# Patient Record
Sex: MALE | Race: WHITE | HISPANIC OR LATINO | Employment: OTHER | URBAN - METROPOLITAN AREA
[De-identification: names, ages, dates, MRNs, and addresses within clinical notes are randomized per-mention and may not be internally consistent; named-entity substitution may affect disease eponyms.]

---

## 2017-02-13 RX ORDER — SIMVASTATIN 40 MG
40 TABLET ORAL EVERY EVENING
Qty: 90 TAB | Refills: 3 | Status: SHIPPED | OUTPATIENT
Start: 2017-02-13 | End: 2018-01-31 | Stop reason: SDUPTHER

## 2017-04-28 ENCOUNTER — HOSPITAL ENCOUNTER (OUTPATIENT)
Dept: LAB | Facility: MEDICAL CENTER | Age: 62
End: 2017-04-28
Attending: FAMILY MEDICINE
Payer: COMMERCIAL

## 2017-04-28 LAB
ALBUMIN SERPL BCP-MCNC: 4.2 G/DL (ref 3.2–4.9)
ALBUMIN/GLOB SERPL: 1.6 G/DL
ALP SERPL-CCNC: 81 U/L (ref 30–99)
ALT SERPL-CCNC: 24 U/L (ref 2–50)
ANION GAP SERPL CALC-SCNC: 5 MMOL/L (ref 0–11.9)
AST SERPL-CCNC: 20 U/L (ref 12–45)
BILIRUB SERPL-MCNC: 0.5 MG/DL (ref 0.1–1.5)
BUN SERPL-MCNC: 18 MG/DL (ref 8–22)
CALCIUM SERPL-MCNC: 9.3 MG/DL (ref 8.5–10.5)
CHLORIDE SERPL-SCNC: 106 MMOL/L (ref 96–112)
CHOLEST SERPL-MCNC: 159 MG/DL (ref 100–199)
CO2 SERPL-SCNC: 28 MMOL/L (ref 20–33)
CREAT SERPL-MCNC: 1 MG/DL (ref 0.5–1.4)
EST. AVERAGE GLUCOSE BLD GHB EST-MCNC: 120 MG/DL
GFR SERPL CREATININE-BSD FRML MDRD: >60 ML/MIN/1.73 M 2
GLOBULIN SER CALC-MCNC: 2.7 G/DL (ref 1.9–3.5)
GLUCOSE SERPL-MCNC: 113 MG/DL (ref 65–99)
HBA1C MFR BLD: 5.8 % (ref 0–5.6)
HDLC SERPL-MCNC: 53 MG/DL
LDLC SERPL CALC-MCNC: 82 MG/DL
POTASSIUM SERPL-SCNC: 4.5 MMOL/L (ref 3.6–5.5)
PROT SERPL-MCNC: 6.9 G/DL (ref 6–8.2)
SODIUM SERPL-SCNC: 139 MMOL/L (ref 135–145)
TRIGL SERPL-MCNC: 121 MG/DL (ref 0–149)

## 2017-04-28 PROCEDURE — 80053 COMPREHEN METABOLIC PANEL: CPT

## 2017-04-28 PROCEDURE — 83036 HEMOGLOBIN GLYCOSYLATED A1C: CPT

## 2017-04-28 PROCEDURE — 80061 LIPID PANEL: CPT

## 2017-04-28 PROCEDURE — 36415 COLL VENOUS BLD VENIPUNCTURE: CPT

## 2017-05-23 ENCOUNTER — HOSPITAL ENCOUNTER (OUTPATIENT)
Dept: RADIOLOGY | Facility: MEDICAL CENTER | Age: 62
End: 2017-05-23
Attending: FAMILY MEDICINE
Payer: COMMERCIAL

## 2017-05-23 DIAGNOSIS — M25.552 LEFT HIP PAIN: ICD-10-CM

## 2017-05-23 PROCEDURE — 73502 X-RAY EXAM HIP UNI 2-3 VIEWS: CPT | Mod: LT

## 2017-05-29 ENCOUNTER — OFFICE VISIT (OUTPATIENT)
Dept: URGENT CARE | Facility: PHYSICIAN GROUP | Age: 62
End: 2017-05-29
Payer: COMMERCIAL

## 2017-05-29 VITALS
BODY MASS INDEX: 27.49 KG/M2 | HEIGHT: 70 IN | OXYGEN SATURATION: 92 % | DIASTOLIC BLOOD PRESSURE: 80 MMHG | SYSTOLIC BLOOD PRESSURE: 158 MMHG | HEART RATE: 96 BPM | TEMPERATURE: 97.8 F | WEIGHT: 192 LBS

## 2017-05-29 DIAGNOSIS — J40 BRONCHITIS: ICD-10-CM

## 2017-05-29 PROCEDURE — 99214 OFFICE O/P EST MOD 30 MIN: CPT | Performed by: EMERGENCY MEDICINE

## 2017-05-29 RX ORDER — DOXYCYCLINE HYCLATE 100 MG
100 TABLET ORAL 2 TIMES DAILY
Qty: 14 TAB | Refills: 0 | Status: SHIPPED | OUTPATIENT
Start: 2017-05-29 | End: 2018-05-31

## 2017-05-29 RX ORDER — CODEINE PHOSPHATE AND GUAIFENESIN 10; 100 MG/5ML; MG/5ML
5 SOLUTION ORAL EVERY 4 HOURS PRN
Qty: 200 ML | Refills: 0 | Status: SHIPPED | OUTPATIENT
Start: 2017-05-29 | End: 2018-05-31

## 2017-05-29 ASSESSMENT — ENCOUNTER SYMPTOMS
PALPITATIONS: 0
ABDOMINAL PAIN: 0
NECK PAIN: 0
STRIDOR: 0
NERVOUS/ANXIOUS: 0
SENSORY CHANGE: 0
HEMOPTYSIS: 0
NAUSEA: 0
DIARRHEA: 0
EYE DISCHARGE: 0
FEVER: 0
EYE PAIN: 0
HEADACHES: 1
COUGH: 0

## 2017-05-29 NOTE — PROGRESS NOTES
Subjective:      Tristin Hernandez is a 61 y.o. male who presents with Pharyngitis and Other            Pharyngitis   This is a new problem. The current episode started in the past 7 days. The problem has been unchanged. Neither side of throat is experiencing more pain than the other. The pain is mild. Associated symptoms include congestion and headaches (patient had severe headache yesterday no headache today.). Pertinent negatives include no abdominal pain, coughing, diarrhea, drooling, ear discharge, ear pain, neck pain or stridor. Associated symptoms comments: Aleve that had  3/17. I reassured him that the pills were probably safe but that the quantity taking 3 was probably too much to taking one 24-hour period time. But taking treated time.. He has had no exposure to mono.   Other  Associated symptoms include congestion and headaches (patient had severe headache yesterday no headache today.). Pertinent negatives include no abdominal pain, chest pain, coughing, fever, nausea, neck pain or rash.     Allergies   Allergen Reactions   • Kiwi Extract    • Penicillins Hives and Itching   • Sulfa Drugs      Dizzy,tongue swells     Social History     Social History   • Marital Status:      Spouse Name: N/A   • Number of Children: N/A   • Years of Education: N/A     Occupational History   • Not on file.     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  .     Past Medical History   Diagnosis Date   • Hyperlipidemia    • Heart burn    • Blood transfusion without reported diagnosis 11 yoa     during surgery/  [place]   • Kidney disease      kidney stone at 10 yoa  "  • Cataract      s/[p surgery hcristi   • Stroke (CMS-HCC) 6/2013     see ER records; anant consult after. presented with left sided weakness. takes 81mg daily     Current Outpatient Prescriptions on File Prior to Visit   Medication Sig Dispense Refill   • simvastatin (ZOCOR) 40 MG Tab Take 1 Tab by mouth every evening. 90 Tab 3   • Glucosamine-Chondroit-Vit C-Mn (GLUCOSAMINE 1500 COMPLEX) Cap Take  by mouth.     • aspirin EC (ECOTRIN) 81 MG Tablet Delayed Response Take 81 mg by mouth every day.     • omeprazole (PRILOSEC) 20 MG delayed-release capsule Take 1 Cap by mouth every day. TAKE 1 CAP BY MOUTH EVERY DAY. 90 Cap 3   • vitamin D (CHOLECALCIFEROL) 1000 UNIT Tab Take 1,000 Units by mouth every day.     • finasteride (PROSCAR) 5 MG Tab   1   • tamsulosin (FLOMAX) 0.4 MG capsule Take 0.4 mg by mouth 2 Times a Day.  4     No current facility-administered medications on file prior to visit.     Family History   Problem Relation Age of Onset   • Seizures Mother    • Cancer Sister      eldest sister-lung/chest       Review of Systems   Constitutional: Negative for fever.   HENT: Positive for congestion. Negative for drooling, ear discharge and ear pain.    Eyes: Negative for pain and discharge.   Respiratory: Negative for cough, hemoptysis and stridor.    Cardiovascular: Negative for chest pain and palpitations.   Gastrointestinal: Negative for nausea, abdominal pain and diarrhea.   Musculoskeletal: Negative for neck pain.   Skin: Negative for rash.   Neurological: Positive for headaches (patient had severe headache yesterday no headache today.). Negative for sensory change.   Psychiatric/Behavioral: The patient is not nervous/anxious.           Objective:     /80 mmHg  Pulse 96  Temp(Src) 36.6 °C (97.8 °F)  Ht 1.778 m (5' 10\")  Wt 87.091 kg (192 lb)  BMI 27.55 kg/m2  SpO2 92%     Physical Exam   Constitutional: He appears well-nourished. No distress.   HENT:   Head: Normocephalic and atraumatic.   Right " Ear: External ear normal.   Left Ear: External ear normal.   Patient has no palpable tenderness of his frontal or maxillary sinuses.   Eyes: Conjunctivae are normal. Left eye exhibits no discharge.   Neck: Normal range of motion. No thyromegaly present.   Cardiovascular: Normal rate.    Pulmonary/Chest: Breath sounds normal. No respiratory distress. He has no wheezes.   Abdominal: He exhibits no distension. There is no tenderness.   Skin: Skin is dry. No erythema. No pallor.   Psychiatric: He has a normal mood and affect.   Vitals reviewed.              Assessment/Plan:     1. Bronchitis      I am recommending the patient initiate/ continue hydration efforts including the use of a vaporizer/humidifier/ netti pot. I also recommend the pt, initiate Mucinex .. In addition the patient will initiate the prescribed prescription medication/s:  If the patient's condition exacerbates with worsening dysphagia, shortness of breath, uncontrolled fever, headache or chest pressure he/she will return immediately to the urgent care or go to  the emergency department for further evaluation.  Patient will initiate Vibramycin 100 mg twice a day. (Allergic to penicillins, sulfa drugs and). Additional Cheratussin for his cough to use at home only. He's been given a work note for the next 2 days off.  SHARIF Talbot    - doxycycline (VIBRAMYCIN) 100 MG Tab; Take 1 Tab by mouth 2 times a day.  Dispense: 14 Tab; Refill: 0  - guaifenesin-codeine (CHERATUSSIN AC) Solution oral solution; Take 5 mL by mouth every four hours as needed.  Dispense: 200 mL; Refill: 0

## 2017-05-29 NOTE — MR AVS SNAPSHOT
"        Tristin ShawUriah   2017 9:45 AM   Office Visit   MRN: [id number]    Department:  [facility]   Dept Phone:  [phone]    Description:  Male : 1955   Provider:  SHARIF Talbot M.D.           Reason for Visit     Pharyngitis sore throat and cough x1week     Other is concerned because he took  pills and isnt feeling well      Allergies as of 2017     Allergen Noted Reactions    Kiwi Extract 12/15/2015       Penicillins 2012   Hives, Itching    Sulfa Drugs 2012       Dizzy,tongue swells      You were diagnosed with     Bronchitis   [182227]         Vital Signs     Blood Pressure Pulse Temperature Height Weight Body Mass Index    158/80 mmHg 96 36.6 °C (97.8 °F) 1.778 m (5' 10\") 87.091 kg (192 lb) 27.55 kg/m2    Oxygen Saturation Smoking Status                92% Current Every Day Smoker          Basic Information     Date Of Birth Sex Race Ethnicity Preferred Language    1955 Male  or   Origin (Pashto,Equatorial [place]inean,Nicaraguan,Bam, etc) English      Problem List              ICD-10-CM Priority Class Noted - Resolved    History of CVA (cerebrovascular accident) without residual deficits Z86.73 Medium  2013 - Present    PETER (obstructive sleep apnea) G47.33 Medium  2013 - Present    Hypercholesteremia E78.00 Medium  2013 - Present    Tobacco dependence F17.200 Low  2013 - Present    History of positive PPD R76.11 Low  2014 - Present    Vitamin D deficiency E55.9 Low  12/15/2015 - Present    BPH (benign prostatic hyperplasia) N40.0 Medium  12/15/2015 - Present    IGT A1c-=5.8 R73.02 Low  12/15/2015 - Present    Gastroesophageal reflux disease without esophagitis K21.9   12/15/2015 - Present      Health Maintenance        Date Due Completion Dates    IMM DTaP/Tdap/Td Vaccine (1 - Tdap) 2009 6/15/2009    IMM ZOSTER VACCINE 2013 ---    COLONOSCOPY 2018 (Done)    Override on 2013: Done " (polyps-non-cancerous.  [facility])            Current Immunizations     Influenza TIV (IM) 10/24/2013, 11/30/2012    Influenza Vaccine Quad Inj (Pf) 10/3/2016  7:42 AM, 10/5/2015  7:39 AM    Pneumococcal polysaccharide vaccine (PPSV-23) 6/18/2013  3:21 PM    TD Vaccine 6/15/2009    Tuberculin Skin Test 9/17/2014      Below and/or attached are the medications your provider expects you to take. Review all of your home medications and newly ordered medications with your provider and/or pharmacist. Follow medication instructions as directed by your provider and/or pharmacist. Please keep your medication list with you and share with your provider. Update the information when medications are discontinued, doses are changed, or new medications (including over-the-counter products) are added; and carry medication information at all times in the event of emergency situations     Allergies:  KIWI EXTRACT - (reactions not documented)     PENICILLINS - Hives,Itching     SULFA DRUGS - (reactions not documented)               Medications  Valid as of: May 29, 2017 - 10:23 AM    Generic Name Brand Name Tablet Size Instructions for use    Aspirin (Tablet Delayed Response) ECOTRIN 81 MG Take 81 mg by mouth every day.        Cholecalciferol (Tab) cholecalciferol 1000 UNIT Take 1,000 Units by mouth every day.        Doxycycline Hyclate (Tab) VIBRAMYCIN 100 MG Take 1 Tab by mouth 2 times a day.        Finasteride (Tab) PROSCAR 5 MG         Glucosamine-Chondroit-Vit C-Mn (Cap) GLUCOSAMINE 1500 COMPLEX  Take  by mouth.        Guaifenesin-Codeine (Solution) ROBITUSSIN -10 mg/5mL Take 5 mL by mouth every four hours as needed.        Omeprazole (CAPSULE DELAYED RELEASE) PRILOSEC 20 MG Take 1 Cap by mouth every day. TAKE 1 CAP BY MOUTH EVERY DAY.        Simvastatin (Tab) ZOCOR 40 MG Take 1 Tab by mouth every evening.        Tamsulosin HCl (Cap) FLOMAX 0.4 MG Take 0.4 mg by mouth 2 Times a Day.        .                 Medicines  prescribed today were sent to:     [facility] #[id number] - [place], [place] - [address] AT [facility], operated [facility]    [address] [place] [address]    Phone: [phone] Fax: [phone]    Open 24 Hours?: No      Medication refill instructions:       If your prescription bottle indicates you have medication refills left, it is not necessary to call your provider’s office. Please contact your pharmacy and they will refill your medication.    If your prescription bottle indicates you do not have any refills left, you may request refills at any time through one of the following ways: The online [employer] system (except Urgent Care), by calling your provider’s office, or by asking your pharmacy to contact your provider’s office with a refill request. Medication refills are processed only during regular business hours and may not be available until the next business day. Your provider may request additional information or to have a follow-up visit with you prior to refilling your medication.   *Please Note: Medication refills are assigned a new Rx number when refilled electronically. Your pharmacy may indicate that no refills were authorized even though a new prescription for the same medication is available at the pharmacy. Please request the medicine by name with the pharmacy before contacting your provider for a refill.           [employer] Access Code: Activation code not generated  Current [employer] Status: Active          Quit Tobacco Information     Do you want to quit using tobacco?    Quitting tobacco decreases risks of cancer, heart and lung disease, increases life expectancy, improves sense of taste and smell, and increases spending money, among other benefits.    If you are thinking about quitting, we can help.  • [facility] Quit Tobacco Program: 433.147.8787  o Program occurs weekly for four weeks and includes pharmacist consultation on products to support quitting smoking or chewing tobacco. A provider  referral is needed for pharmacist consultation.  • Tobacco Users Help Hotline: 6-424-QUIT-NOW (400-4498) or https://[place].quitlogix.org/  o Free, confidential telephone and online coaching for [place] residents. Sessions are designed on a schedule that is convenient for you. Eligible clients receive free nicotine replacement therapy.  • Nationally: www.smokefree.gov  o Information and professional assistance to support both immediate and long-term needs as you become, and remain, a non-smoker. Smokefree.gov allows you to choose the help that best fits your needs.

## 2017-05-29 NOTE — Clinical Note
May 29, 2017        Tristin Hernandez  [address] [facility] [address]        Dear Tristin:    Please ask for today and tomorrow off from work for medical reasons.    If you have any questions or concerns, please don't hesitate to call.        Sincerely,        SHARIF Talbot M.D.    Electronically Signed

## 2017-08-01 ENCOUNTER — HOSPITAL ENCOUNTER (OUTPATIENT)
Dept: LAB | Facility: MEDICAL CENTER | Age: 62
End: 2017-08-01
Attending: UROLOGY
Payer: COMMERCIAL

## 2017-08-01 LAB — PSA SERPL-MCNC: 1.36 NG/ML (ref 0–4)

## 2017-08-01 PROCEDURE — 84153 ASSAY OF PSA TOTAL: CPT

## 2017-09-22 ENCOUNTER — EH NON-PROVIDER (OUTPATIENT)
Dept: OCCUPATIONAL MEDICINE | Facility: CLINIC | Age: 62
End: 2017-09-22

## 2017-09-22 DIAGNOSIS — Z29.89 NEED FOR ISOLATION: ICD-10-CM

## 2017-09-22 PROCEDURE — 94375 RESPIRATORY FLOW VOLUME LOOP: CPT

## 2017-10-03 ENCOUNTER — IMMUNIZATION (OUTPATIENT)
Dept: OCCUPATIONAL MEDICINE | Facility: CLINIC | Age: 62
End: 2017-10-03

## 2017-10-03 DIAGNOSIS — Z23 NEED FOR VACCINATION: ICD-10-CM

## 2017-10-03 PROCEDURE — 90686 IIV4 VACC NO PRSV 0.5 ML IM: CPT | Performed by: PREVENTIVE MEDICINE

## 2017-10-16 ENCOUNTER — HOSPITAL ENCOUNTER (OUTPATIENT)
Dept: LAB | Facility: MEDICAL CENTER | Age: 62
End: 2017-10-16
Attending: FAMILY MEDICINE
Payer: COMMERCIAL

## 2017-10-16 LAB
ALBUMIN SERPL BCP-MCNC: 3.1 G/DL (ref 3.2–4.9)
ALBUMIN/GLOB SERPL: 0.9 G/DL
ALP SERPL-CCNC: 67 U/L (ref 30–99)
ALT SERPL-CCNC: 23 U/L (ref 2–50)
ANION GAP SERPL CALC-SCNC: 9 MMOL/L (ref 0–11.9)
AST SERPL-CCNC: 19 U/L (ref 12–45)
BILIRUB SERPL-MCNC: 0.5 MG/DL (ref 0.1–1.5)
BUN SERPL-MCNC: 19 MG/DL (ref 8–22)
CALCIUM SERPL-MCNC: 9 MG/DL (ref 8.5–10.5)
CHLORIDE SERPL-SCNC: 108 MMOL/L (ref 96–112)
CHOLEST SERPL-MCNC: 157 MG/DL (ref 100–199)
CO2 SERPL-SCNC: 25 MMOL/L (ref 20–33)
CREAT SERPL-MCNC: 0.82 MG/DL (ref 0.5–1.4)
EST. AVERAGE GLUCOSE BLD GHB EST-MCNC: 120 MG/DL
GFR SERPL CREATININE-BSD FRML MDRD: >60 ML/MIN/1.73 M 2
GLOBULIN SER CALC-MCNC: 3.5 G/DL (ref 1.9–3.5)
GLUCOSE SERPL-MCNC: 107 MG/DL (ref 65–99)
HBA1C MFR BLD: 5.8 % (ref 0–5.6)
HDLC SERPL-MCNC: 54 MG/DL
LDLC SERPL CALC-MCNC: 82 MG/DL
POTASSIUM SERPL-SCNC: 3.7 MMOL/L (ref 3.6–5.5)
PROT SERPL-MCNC: 6.6 G/DL (ref 6–8.2)
SODIUM SERPL-SCNC: 142 MMOL/L (ref 135–145)
TRIGL SERPL-MCNC: 104 MG/DL (ref 0–149)

## 2017-10-16 PROCEDURE — 80053 COMPREHEN METABOLIC PANEL: CPT

## 2017-10-16 PROCEDURE — 36415 COLL VENOUS BLD VENIPUNCTURE: CPT

## 2017-10-16 PROCEDURE — 83036 HEMOGLOBIN GLYCOSYLATED A1C: CPT

## 2017-10-16 PROCEDURE — 80061 LIPID PANEL: CPT

## 2018-01-12 ENCOUNTER — HOSPITAL ENCOUNTER (OUTPATIENT)
Dept: RADIOLOGY | Facility: MEDICAL CENTER | Age: 63
End: 2018-01-12
Attending: FAMILY MEDICINE
Payer: COMMERCIAL

## 2018-01-12 PROCEDURE — 73030 X-RAY EXAM OF SHOULDER: CPT | Mod: LT

## 2018-03-23 ENCOUNTER — HOSPITAL ENCOUNTER (OUTPATIENT)
Dept: LAB | Facility: MEDICAL CENTER | Age: 63
End: 2018-03-23
Attending: FAMILY MEDICINE
Payer: COMMERCIAL

## 2018-03-23 LAB
ALBUMIN SERPL BCP-MCNC: 3.9 G/DL (ref 3.2–4.9)
ALBUMIN/GLOB SERPL: 1.6 G/DL
ALP SERPL-CCNC: 52 U/L (ref 30–99)
ALT SERPL-CCNC: 18 U/L (ref 2–50)
ANION GAP SERPL CALC-SCNC: 6 MMOL/L (ref 0–11.9)
AST SERPL-CCNC: 15 U/L (ref 12–45)
BILIRUB SERPL-MCNC: 0.7 MG/DL (ref 0.1–1.5)
BUN SERPL-MCNC: 18 MG/DL (ref 8–22)
CALCIUM SERPL-MCNC: 9.2 MG/DL (ref 8.5–10.5)
CHLORIDE SERPL-SCNC: 106 MMOL/L (ref 96–112)
CHOLEST SERPL-MCNC: 212 MG/DL (ref 100–199)
CO2 SERPL-SCNC: 26 MMOL/L (ref 20–33)
CREAT SERPL-MCNC: 0.87 MG/DL (ref 0.5–1.4)
EST. AVERAGE GLUCOSE BLD GHB EST-MCNC: 123 MG/DL
GLOBULIN SER CALC-MCNC: 2.5 G/DL (ref 1.9–3.5)
GLUCOSE SERPL-MCNC: 104 MG/DL (ref 65–99)
HBA1C MFR BLD: 5.9 % (ref 0–5.6)
HDLC SERPL-MCNC: 49 MG/DL
LDLC SERPL CALC-MCNC: 123 MG/DL
POTASSIUM SERPL-SCNC: 3.6 MMOL/L (ref 3.6–5.5)
PROT SERPL-MCNC: 6.4 G/DL (ref 6–8.2)
SODIUM SERPL-SCNC: 138 MMOL/L (ref 135–145)
TRIGL SERPL-MCNC: 198 MG/DL (ref 0–149)

## 2018-03-23 PROCEDURE — 80053 COMPREHEN METABOLIC PANEL: CPT

## 2018-03-23 PROCEDURE — 80061 LIPID PANEL: CPT

## 2018-03-23 PROCEDURE — 83036 HEMOGLOBIN GLYCOSYLATED A1C: CPT

## 2018-03-23 PROCEDURE — 36415 COLL VENOUS BLD VENIPUNCTURE: CPT

## 2018-05-18 ENCOUNTER — HOSPITAL ENCOUNTER (OUTPATIENT)
Dept: RADIOLOGY | Facility: MEDICAL CENTER | Age: 63
End: 2018-05-18
Attending: SURGERY
Payer: COMMERCIAL

## 2018-05-18 DIAGNOSIS — M25.512 LEFT SHOULDER PAIN, UNSPECIFIED CHRONICITY: ICD-10-CM

## 2018-05-18 PROCEDURE — 73221 MRI JOINT UPR EXTREM W/O DYE: CPT | Mod: LT

## 2018-05-31 ENCOUNTER — APPOINTMENT (OUTPATIENT)
Dept: ADMISSIONS | Facility: MEDICAL CENTER | Age: 63
End: 2018-05-31
Attending: OPHTHALMOLOGY
Payer: COMMERCIAL

## 2018-05-31 DIAGNOSIS — Z01.810 PRE-OPERATIVE CARDIOVASCULAR EXAMINATION: ICD-10-CM

## 2018-05-31 DIAGNOSIS — Z01.812 PRE-OPERATIVE LABORATORY EXAMINATION: ICD-10-CM

## 2018-05-31 LAB
BASOPHILS # BLD AUTO: 0.7 % (ref 0–1.8)
BASOPHILS # BLD: 0.06 K/UL (ref 0–0.12)
EKG IMPRESSION: NORMAL
EOSINOPHIL # BLD AUTO: 0.21 K/UL (ref 0–0.51)
EOSINOPHIL NFR BLD: 2.5 % (ref 0–6.9)
ERYTHROCYTE [DISTWIDTH] IN BLOOD BY AUTOMATED COUNT: 41.3 FL (ref 35.9–50)
HCT VFR BLD AUTO: 47.7 % (ref 42–52)
HGB BLD-MCNC: 16.5 G/DL (ref 14–18)
IMM GRANULOCYTES # BLD AUTO: 0.04 K/UL (ref 0–0.11)
IMM GRANULOCYTES NFR BLD AUTO: 0.5 % (ref 0–0.9)
LYMPHOCYTES # BLD AUTO: 2.22 K/UL (ref 1–4.8)
LYMPHOCYTES NFR BLD: 26.6 % (ref 22–41)
MCH RBC QN AUTO: 31.3 PG (ref 27–33)
MCHC RBC AUTO-ENTMCNC: 34.6 G/DL (ref 33.7–35.3)
MCV RBC AUTO: 90.5 FL (ref 81.4–97.8)
MONOCYTES # BLD AUTO: 0.8 K/UL (ref 0–0.85)
MONOCYTES NFR BLD AUTO: 9.6 % (ref 0–13.4)
NEUTROPHILS # BLD AUTO: 5.03 K/UL (ref 1.82–7.42)
NEUTROPHILS NFR BLD: 60.1 % (ref 44–72)
NRBC # BLD AUTO: 0 K/UL
NRBC BLD-RTO: 0 /100 WBC
PLATELET # BLD AUTO: 207 K/UL (ref 164–446)
PMV BLD AUTO: 9.2 FL (ref 9–12.9)
RBC # BLD AUTO: 5.27 M/UL (ref 4.7–6.1)
WBC # BLD AUTO: 8.4 K/UL (ref 4.8–10.8)

## 2018-05-31 PROCEDURE — 36415 COLL VENOUS BLD VENIPUNCTURE: CPT

## 2018-05-31 PROCEDURE — 85025 COMPLETE CBC W/AUTO DIFF WBC: CPT

## 2018-05-31 RX ORDER — CELECOXIB 200 MG/1
200 CAPSULE ORAL DAILY
COMMUNITY
End: 2018-12-18

## 2018-05-31 NOTE — OR NURSING
Pre admit: spoke with Herminia at Dr Bee's office and she said patient does not have to stop any of his Rx's or OTC and OK to take DOS.

## 2018-06-04 ENCOUNTER — HOSPITAL ENCOUNTER (OUTPATIENT)
Facility: MEDICAL CENTER | Age: 63
End: 2018-06-04
Attending: OPHTHALMOLOGY | Admitting: OPHTHALMOLOGY
Payer: COMMERCIAL

## 2018-06-04 VITALS
BODY MASS INDEX: 26.02 KG/M2 | OXYGEN SATURATION: 94 % | WEIGHT: 185.85 LBS | RESPIRATION RATE: 17 BRPM | DIASTOLIC BLOOD PRESSURE: 80 MMHG | TEMPERATURE: 98.4 F | HEIGHT: 71 IN | SYSTOLIC BLOOD PRESSURE: 166 MMHG | HEART RATE: 64 BPM

## 2018-06-04 PROCEDURE — 500558 HCHG EYE SHIELD W/GARTER (FOX): Performed by: OPHTHALMOLOGY

## 2018-06-04 PROCEDURE — 501836 HCHG SUTURE EYE: Performed by: OPHTHALMOLOGY

## 2018-06-04 PROCEDURE — 160036 HCHG PACU - EA ADDL 30 MINS PHASE I: Performed by: OPHTHALMOLOGY

## 2018-06-04 PROCEDURE — 700111 HCHG RX REV CODE 636 W/ 250 OVERRIDE (IP)

## 2018-06-04 PROCEDURE — 160002 HCHG RECOVERY MINUTES (STAT): Performed by: OPHTHALMOLOGY

## 2018-06-04 PROCEDURE — 160035 HCHG PACU - 1ST 60 MINS PHASE I: Performed by: OPHTHALMOLOGY

## 2018-06-04 PROCEDURE — 160041 HCHG SURGERY MINUTES - EA ADDL 1 MIN LEVEL 4: Performed by: OPHTHALMOLOGY

## 2018-06-04 PROCEDURE — 160048 HCHG OR STATISTICAL LEVEL 1-5: Performed by: OPHTHALMOLOGY

## 2018-06-04 PROCEDURE — A6402 STERILE GAUZE <= 16 SQ IN: HCPCS | Performed by: OPHTHALMOLOGY

## 2018-06-04 PROCEDURE — A6410 STERILE EYE PAD: HCPCS | Performed by: OPHTHALMOLOGY

## 2018-06-04 PROCEDURE — 700102 HCHG RX REV CODE 250 W/ 637 OVERRIDE(OP)

## 2018-06-04 PROCEDURE — 700101 HCHG RX REV CODE 250

## 2018-06-04 PROCEDURE — A9270 NON-COVERED ITEM OR SERVICE: HCPCS

## 2018-06-04 PROCEDURE — 160029 HCHG SURGERY MINUTES - 1ST 30 MINS LEVEL 4: Performed by: OPHTHALMOLOGY

## 2018-06-04 PROCEDURE — 160009 HCHG ANES TIME/MIN: Performed by: OPHTHALMOLOGY

## 2018-06-04 RX ORDER — BALANCED SALT SOLUTION 6.4; .75; .48; .3; 3.9; 1.7 MG/ML; MG/ML; MG/ML; MG/ML; MG/ML; MG/ML
SOLUTION OPHTHALMIC
Status: DISCONTINUED | OUTPATIENT
Start: 2018-06-04 | End: 2018-06-04 | Stop reason: HOSPADM

## 2018-06-04 RX ORDER — OXYCODONE HCL 5 MG/5 ML
SOLUTION, ORAL ORAL
Status: COMPLETED
Start: 2018-06-04 | End: 2018-06-04

## 2018-06-04 RX ORDER — DEXAMETHASONE SODIUM PHOSPHATE 4 MG/ML
INJECTION, SOLUTION INTRA-ARTICULAR; INTRALESIONAL; INTRAMUSCULAR; INTRAVENOUS; SOFT TISSUE
Status: DISCONTINUED | OUTPATIENT
Start: 2018-06-04 | End: 2018-06-04 | Stop reason: HOSPADM

## 2018-06-04 RX ORDER — SODIUM CHLORIDE, SODIUM LACTATE, POTASSIUM CHLORIDE, CALCIUM CHLORIDE 600; 310; 30; 20 MG/100ML; MG/100ML; MG/100ML; MG/100ML
INJECTION, SOLUTION INTRAVENOUS CONTINUOUS
Status: DISCONTINUED | OUTPATIENT
Start: 2018-06-04 | End: 2018-06-04 | Stop reason: HOSPADM

## 2018-06-04 RX ORDER — NEOMYCIN SULFATE, POLYMYXIN B SULFATE, AND DEXAMETHASONE 3.5; 10000; 1 MG/G; [USP'U]/G; MG/G
OINTMENT OPHTHALMIC
Status: DISCONTINUED | OUTPATIENT
Start: 2018-06-04 | End: 2018-06-04 | Stop reason: HOSPADM

## 2018-06-04 RX ORDER — BALANCED SALT SOLUTION ENRICHED WITH BICARBONATE, DEXTROSE, AND GLUTATHIONE
KIT INTRAOCULAR
Status: DISCONTINUED | OUTPATIENT
Start: 2018-06-04 | End: 2018-06-04 | Stop reason: HOSPADM

## 2018-06-04 RX ORDER — VANCOMYCIN HYDROCHLORIDE 500 MG/10ML
INJECTION, POWDER, LYOPHILIZED, FOR SOLUTION INTRAVENOUS
Status: COMPLETED | OUTPATIENT
Start: 2018-06-04 | End: 2018-06-04

## 2018-06-04 RX ORDER — TIMOLOL MALEATE 5 MG/ML
SOLUTION/ DROPS OPHTHALMIC
Status: DISCONTINUED | OUTPATIENT
Start: 2018-06-04 | End: 2018-06-04 | Stop reason: HOSPADM

## 2018-06-04 RX ORDER — CYCLOPENTOLATE HYDROCHLORIDE 10 MG/ML
SOLUTION/ DROPS OPHTHALMIC
Status: COMPLETED
Start: 2018-06-04 | End: 2018-06-04

## 2018-06-04 RX ORDER — BALANCED SALT SOLUTION ENRICHED WITH BICARBONATE, DEXTROSE, AND GLUTATHIONE
KIT INTRAOCULAR
Status: DISCONTINUED
Start: 2018-06-04 | End: 2018-06-04 | Stop reason: HOSPADM

## 2018-06-04 RX ORDER — PHENYLEPHRINE HYDROCHLORIDE 25 MG/ML
SOLUTION/ DROPS OPHTHALMIC
Status: COMPLETED
Start: 2018-06-04 | End: 2018-06-04

## 2018-06-04 RX ADMIN — CYCLOPENTOLATE HYDROCHLORIDE 1 DROP: 10 SOLUTION/ DROPS OPHTHALMIC at 12:57

## 2018-06-04 RX ADMIN — PHENYLEPHRINE HYDROCHLORIDE 1 DROP: 2.5 SOLUTION/ DROPS OPHTHALMIC at 12:57

## 2018-06-04 RX ADMIN — FENTANYL CITRATE 25 MCG: 50 INJECTION, SOLUTION INTRAMUSCULAR; INTRAVENOUS at 15:54

## 2018-06-04 RX ADMIN — FENTANYL CITRATE 25 MCG: 50 INJECTION, SOLUTION INTRAMUSCULAR; INTRAVENOUS at 16:30

## 2018-06-04 RX ADMIN — OXYCODONE HYDROCHLORIDE 5 MG: 5 SOLUTION ORAL at 16:30

## 2018-06-04 RX ADMIN — OXYCODONE HYDROCHLORIDE 5 MG: 5 SOLUTION ORAL at 15:54

## 2018-06-04 RX ADMIN — FENTANYL CITRATE 25 MCG: 50 INJECTION, SOLUTION INTRAMUSCULAR; INTRAVENOUS at 15:41

## 2018-06-04 RX ADMIN — FENTANYL CITRATE 25 MCG: 50 INJECTION, SOLUTION INTRAMUSCULAR; INTRAVENOUS at 16:03

## 2018-06-04 ASSESSMENT — PAIN SCALES - GENERAL
PAINLEVEL_OUTOF10: 4
PAINLEVEL_OUTOF10: 4
PAINLEVEL_OUTOF10: 6
PAINLEVEL_OUTOF10: 6
PAINLEVEL_OUTOF10: 0
PAINLEVEL_OUTOF10: 3
PAINLEVEL_OUTOF10: 1
PAINLEVEL_OUTOF10: 4

## 2018-06-04 NOTE — OR NURSING
1520 Pt arrived from OR with Dr. Jaffe.  Pt VSS, PIV infusing without issue.  Oral airway in place, blow by O2 in use.  Eye patch in place to right eye, CDI.    1530 Oral airway removed, pt tolerated well.   1540 Pt complaints of pain, medicated with IV Fentanyl per order.   1545 Attempted to get pt wife, not in waiting room.   1554 Pt tells me he is still having pain, medicated with PO oxycodone and IV fentanyl.    1603 Pt medicated again with IV Fentanyl.   1610 Pt wife brought to bedside.    1615 Report to Melva BASHIR.

## 2018-06-04 NOTE — DISCHARGE INSTRUCTIONS
ACTIVITY: Rest and take it easy for the first 24 hours.  A responsible adult is recommended to remain with you during that time.  It is normal to feel sleepy.  We encourage you to not do anything that requires balance, judgment or coordination.    MILD FLU-LIKE SYMPTOMS ARE NORMAL. YOU MAY EXPERIENCE GENERALIZED MUSCLE ACHES, THROAT IRRITATION, HEADACHE AND/OR SOME NAUSEA.    FOR 24 HOURS DO NOT:  Drive, operate machinery or run household appliances.  Drink beer or alcoholic beverages.   Make important decisions or sign legal documents.    SPECIAL INSTRUCTIONS: *Rotate between left and right side every 2 hours while sleeping. Face down when awake**    DIET: To avoid nausea, slowly advance diet as tolerated, avoiding spicy or greasy foods for the first day.  Add more substantial food to your diet according to your physician's instructions.  Babies can be fed formula or breast milk as soon as they are hungry.  INCREASE FLUIDS AND FIBER TO AVOID CONSTIPATION.    SURGICAL DRESSING/BATHING: *Keep eye patch on at all times until follow up appt**    FOLLOW-UP APPOINTMENT:  A follow-up appointment should be arranged with your doctor; call to schedule.    You should CALL YOUR PHYSICIAN if you develop:  Fever greater than 101 degrees F.  Pain not relieved by medication, or persistent nausea or vomiting.  Excessive bleeding (blood soaking through dressing) or unexpected drainage from the wound.  Extreme redness or swelling around the incision site, drainage of pus or foul smelling drainage.  Inability to urinate or empty your bladder within 8 hours.  Problems with breathing or chest pain.    You should call 911 if you develop problems with breathing or chest pain.  If you are unable to contact your doctor or surgical center, you should go to the nearest emergency room or urgent care center.    Physician's telephone #: **[phone]*    If any questions arise, call your doctor.  If your doctor is not available, please feel free  to call the Surgical Center at [phone].  The Center is open Monday through Friday from 7AM to 7PM.  You can also call the HEALTH HOTLINE open 24 hours/day, 7 days/week and speak to a nurse at [phone], or toll free at [phone].    A registered nurse may call you a few days after your surgery to see how you are doing after your procedure.    MEDICATIONS: Resume taking daily medication.  Take prescribed pain medication with food.  If no medication is prescribed, you may take non-aspirin pain medication if needed.  PAIN MEDICATION CAN BE VERY CONSTIPATING.  Take a stool softener or laxative such as senokot, pericolace, or milk of magnesia if needed.    Prescription given for *none**.  Last pain medication given at  430pm**.    If your physician has prescribed pain medication that includes Acetaminophen (Tylenol), do not take additional Acetaminophen (Tylenol) while taking the prescribed medication.    Depression / Suicide Risk    As you are discharged from this [facility] facility, it is important to learn how to keep safe from harming yourself.    Recognize the warning signs:  · Abrupt changes in personality, positive or negative- including increase in energy   · Giving away possessions  · Change in eating patterns- significant weight changes-  positive or negative  · Change in sleeping patterns- unable to sleep or sleeping all the time   · Unwillingness or inability to communicate  · Depression  · Unusual sadness, discouragement and loneliness  · Talk of wanting to die  · Neglect of personal appearance   · Rebelliousness- reckless behavior  · Withdrawal from people/activities they love  · Confusion- inability to concentrate     If you or a loved one observes any of these behaviors or has concerns about self-harm, here's what you can do:  · Talk about it- your feelings and reasons for harming yourself  · Remove any means that you might use to hurt yourself (examples: pills, rope, extension cords,  firearm)  · Get professional help from the community (Mental Health, Substance Abuse, psychological counseling)  · Do not be alone:Call your Safe Contact- someone whom you trust who will be there for you.  · Call your local CRISIS HOTLINE 452-1501 or 829-795-4915  · Call your local Children's Mobile Crisis Response Team [place] (916) 499-9879 or www.Buffer  · Call the toll free National Suicide Prevention Hotlines   · National Suicide Prevention Lifeline 539-165-FLWR (7067)  [facility] 800-SUICIDE (479-3623)RETINAL DETACHMENT OR VITRECTOMY INSTRUCTIONS    These instructions are provided so that you can have the best chance for a successful recovery from your recent eye surgery. In general, they will remain in effect for at least two to three weeks following your operation.   Please call my office to see me within one week following your discharge from the hospital. You can make this appointment by calling my office. Call Monday through Friday from 8:00 am to 5:00 pm. For patients who live a great distance from my office, I may ask you to make arrangements with your local ophthalmologist for follow up care instead of returning here.     Medications:     There medications are to be used while you are awake. A good night's sleep is an important part of the healing process.     1.   Tobradex, one drop in the operated eye every 4 hours.     2.   Tylenol should be sufficient for any pain; if not, please call my office.     3.   Other medications as directed.     Eye Care:    After leaving the hospital, a patch over the operated eye is considered optional. If a patch is worn at home, it should be changed at least once daily. These patches can usually be purchased at your local drug store.   If the eyelids become stuck together because of discharge, gently wash them with a clean damp washcloth moistened with warm tap water. It is advisable to wash under the eye with a damp washcloth at least  once a day; be careful not to touch or press on the eye itself.     Appearance and sensation in the operated eye:    1.   It is normal for the operated eye to remain somewhat red, swollen and slightly irritated for four to six weeks.     2.   It is expected that there will be an increased amount of tearing and mattering in the operated eye.     Return of eyesight:     1.   You final best vision will not be known until the eye and retina are completely healed, which takes at least two to three months and sometimes much longer.     2.   Following this type of surgery, you may require a change in the prescription for your glasses or contact lenses. In general, checking for a new prescription is not done until at least two to three months following your surgery.     Physical Activities:    Things to Avoid:    1.   Aspirin  2.   Lifting or moving heavy objects (20 pounds or more)  3.   Rubbing the operated eye.   4.   Strenuous or jarring physical exertion such as running, jumping, aerobics and swimming.   5.   Driving a car.  6.   Garden or yard work.  7.   Wearing of contact lenses in the operated eye for 4-6 weeks.  8.   Returning to work or school; depending upon the nature of eye surgery and occupation, I will advise you to refrain from returning to school or work for anywhere from 2-8 weeks.   9.   Air travel unless otherwise instructed.   10. Reading unless otherwise instructed.     Permissible Activities:    1.   Watching television and using your eyes in moderation.   2.   Cooking and light housework.   3.   Riding in a car on paved roads.  4.   Showering, bathing and shaving; however, avoid getting water in your eyes.     Indications for calling my office:     1.   Increased swelling or redness of the eyelids.  2.   Increased persistent pain in the eye which is not relieved by Tylenol or which does not go away within 24 hours.   3.   Decreased vision.         ·

## 2018-06-05 NOTE — PROGRESS NOTES
"1615 Bedside report received from Luisana BASHIR. Pt resting comfortably. Tolerating sips of water.     1630 Pt states \"pain is getting worse.\" Medicated per MAR. Repositioned.     1710 Pt states he is ready to go home. Disconnected from monitor to get dressed.     1727 Pt and spouse educated on discharge instructions. Verbalized understanding. All belongings are with patient. Discharged home.   "

## 2018-06-05 NOTE — OP REPORT
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    SURGEON:  Rolf Bee MD    ASSISTANT:  None.    ANESTHESIA:  General.    ANESTHESIOLOGIST:  Guanakito Jaffe MD    BLOOD LOSS:  None.    SPECIMENS REMOVED:  None.    INDICATION FOR SURGERY:  This patient presented with a chronic retinal   detachment with subretinal bands inferiorly in the right eye.  We discussed in   detail the risks, benefits and alternatives regarding surgery.  The risks   include, but not limited to the following, lens changes, high eye pressure,   low eye pressure, bleeding, infection, persistent detachment, recurrent   detachment, need for further surgery, pain, induced refractive error, complete   and irreversible loss of all vision, loss of the eye.  The patient understood   there were no structural or visual guarantees.  He understood that   postoperative positioning would be important to the success of the surgery.    He understood that he would be prohibited from going up an elevation as long   as any gas remained in the eye.  He also on a separate occasion watched a   video that reviewed the risks and benefits of surgery.  He had a chance of all   his questions answered.  He consented to proceed.    PROCEDURE IN DETAIL:  Patient prepped and draped in the usual sterile manner.    Attention was directed toward the right eye.  The 25 gauge trocars were   placed 3.5 mm posterior to limbus at 8:30, 9:30 and 2:30 o'clock.  The   infusion cannula was placed in the inferotemporal sclerotomy.  Central   vitrectomy was then performed.  We could see a small break, a putative break,   in association with retinal hemorrhage at approximately 6 o'clock in the   posterior to the vitreous base.  We lasered this area.  We then performed a   drainage  retinotomy with a diathermy inferior to the optic nerve and performed   an air fluid exchange.  Endolaser was applied to the retinotomy site as well   as inferior barrier laser was applied from 3 to 9 o'clock from the ora shanell   to the posterior edge of the vitreous base.  The air was then exchanged with   15 mL of 20% SF6 through the infusion cannula.  The trocars were removed.    There was thought to be a slight leak at the inferotemporal sclerotomy and   thus this was closed with 7-0 Vicryl.  The pressure was approximately 18 by   digital estimation.  Subconjunctival Decadron and vancomycin were injected.    The patient tolerated the procedure well and was taken to the recovery room in   good and stable condition.       ____________________________________     MD LAUREN CABRERA / AMANDEEP    DD:  06/04/2018 15:35:37  DT:  06/04/2018 16:21:28    D#:  [id number]  Job#:  [id number]    cc: ____ ____

## 2018-07-03 ENCOUNTER — HOSPITAL ENCOUNTER (OUTPATIENT)
Dept: LAB | Facility: MEDICAL CENTER | Age: 63
End: 2018-07-03
Attending: FAMILY MEDICINE
Payer: COMMERCIAL

## 2018-07-03 LAB
ALBUMIN SERPL BCP-MCNC: 4.3 G/DL (ref 3.2–4.9)
ALBUMIN/GLOB SERPL: 1.9 G/DL
ALP SERPL-CCNC: 63 U/L (ref 30–99)
ALT SERPL-CCNC: 24 U/L (ref 2–50)
ANION GAP SERPL CALC-SCNC: 7 MMOL/L (ref 0–11.9)
AST SERPL-CCNC: 20 U/L (ref 12–45)
BILIRUB SERPL-MCNC: 0.6 MG/DL (ref 0.1–1.5)
BUN SERPL-MCNC: 23 MG/DL (ref 8–22)
CALCIUM SERPL-MCNC: 9.7 MG/DL (ref 8.5–10.5)
CHLORIDE SERPL-SCNC: 110 MMOL/L (ref 96–112)
CHOLEST SERPL-MCNC: 215 MG/DL (ref 100–199)
CO2 SERPL-SCNC: 24 MMOL/L (ref 20–33)
CREAT SERPL-MCNC: 0.88 MG/DL (ref 0.5–1.4)
GLOBULIN SER CALC-MCNC: 2.3 G/DL (ref 1.9–3.5)
GLUCOSE SERPL-MCNC: 104 MG/DL (ref 65–99)
HDLC SERPL-MCNC: 61 MG/DL
LDLC SERPL CALC-MCNC: 129 MG/DL
POTASSIUM SERPL-SCNC: 3.9 MMOL/L (ref 3.6–5.5)
PROT SERPL-MCNC: 6.6 G/DL (ref 6–8.2)
SODIUM SERPL-SCNC: 141 MMOL/L (ref 135–145)
TRIGL SERPL-MCNC: 127 MG/DL (ref 0–149)

## 2018-07-03 PROCEDURE — 36415 COLL VENOUS BLD VENIPUNCTURE: CPT

## 2018-07-03 PROCEDURE — 80061 LIPID PANEL: CPT

## 2018-07-03 PROCEDURE — 80053 COMPREHEN METABOLIC PANEL: CPT

## 2018-08-06 ENCOUNTER — HOSPITAL ENCOUNTER (OUTPATIENT)
Dept: LAB | Facility: MEDICAL CENTER | Age: 63
End: 2018-08-06
Attending: PHYSICIAN ASSISTANT
Payer: COMMERCIAL

## 2018-08-06 PROCEDURE — 36415 COLL VENOUS BLD VENIPUNCTURE: CPT

## 2018-08-06 PROCEDURE — 84154 ASSAY OF PSA FREE: CPT

## 2018-08-06 PROCEDURE — 84153 ASSAY OF PSA TOTAL: CPT

## 2018-08-08 LAB
PSA FREE MFR SERPL: 8 %
PSA FREE SERPL-MCNC: 0.1 NG/ML
PSA SERPL-MCNC: 1.3 NG/ML (ref 0–4)

## 2018-08-16 ENCOUNTER — HOSPITAL ENCOUNTER (OUTPATIENT)
Dept: LAB | Facility: MEDICAL CENTER | Age: 63
End: 2018-08-16
Attending: PHYSICIAN ASSISTANT
Payer: COMMERCIAL

## 2018-08-16 PROCEDURE — 87077 CULTURE AEROBIC IDENTIFY: CPT

## 2018-08-16 PROCEDURE — 87086 URINE CULTURE/COLONY COUNT: CPT

## 2018-08-16 PROCEDURE — 87186 SC STD MICRODIL/AGAR DIL: CPT

## 2018-08-18 LAB
AMBIGUOUS DTTM AMBI4: NORMAL
SIGNIFICANT IND 70042: NORMAL
SITE SITE: NORMAL
SOURCE SOURCE: NORMAL

## 2018-08-20 LAB
BACTERIA UR CULT: ABNORMAL
BACTERIA UR CULT: ABNORMAL
SIGNIFICANT IND 70042: ABNORMAL
SITE SITE: ABNORMAL
SOURCE SOURCE: ABNORMAL

## 2018-09-05 ENCOUNTER — NON-PROVIDER VISIT (OUTPATIENT)
Dept: OCCUPATIONAL MEDICINE | Facility: CLINIC | Age: 63
End: 2018-09-05

## 2018-12-18 DIAGNOSIS — Z01.812 PRE-PROCEDURAL LABORATORY EXAMINATION: ICD-10-CM

## 2018-12-18 DIAGNOSIS — Z01.810 PRE-OPERATIVE CARDIOVASCULAR EXAMINATION: ICD-10-CM

## 2018-12-18 LAB
ANION GAP SERPL CALC-SCNC: 4 MMOL/L (ref 0–11.9)
BUN SERPL-MCNC: 21 MG/DL (ref 8–22)
CALCIUM SERPL-MCNC: 10 MG/DL (ref 8.5–10.5)
CHLORIDE SERPL-SCNC: 106 MMOL/L (ref 96–112)
CO2 SERPL-SCNC: 28 MMOL/L (ref 20–33)
CREAT SERPL-MCNC: 0.98 MG/DL (ref 0.5–1.4)
EKG IMPRESSION: NORMAL
ERYTHROCYTE [DISTWIDTH] IN BLOOD BY AUTOMATED COUNT: 40.7 FL (ref 35.9–50)
GLUCOSE SERPL-MCNC: 105 MG/DL (ref 65–99)
HCT VFR BLD AUTO: 46.2 % (ref 42–52)
HGB BLD-MCNC: 16.2 G/DL (ref 14–18)
MCH RBC QN AUTO: 31.5 PG (ref 27–33)
MCHC RBC AUTO-ENTMCNC: 35.1 G/DL (ref 33.7–35.3)
MCV RBC AUTO: 89.9 FL (ref 81.4–97.8)
PLATELET # BLD AUTO: 235 K/UL (ref 164–446)
PMV BLD AUTO: 9.5 FL (ref 9–12.9)
POTASSIUM SERPL-SCNC: 3.8 MMOL/L (ref 3.6–5.5)
RBC # BLD AUTO: 5.14 M/UL (ref 4.7–6.1)
SODIUM SERPL-SCNC: 138 MMOL/L (ref 135–145)
WBC # BLD AUTO: 9.1 K/UL (ref 4.8–10.8)

## 2018-12-18 PROCEDURE — 93010 ELECTROCARDIOGRAM REPORT: CPT | Performed by: INTERNAL MEDICINE

## 2018-12-18 PROCEDURE — 80048 BASIC METABOLIC PNL TOTAL CA: CPT

## 2018-12-18 PROCEDURE — 36415 COLL VENOUS BLD VENIPUNCTURE: CPT

## 2018-12-18 PROCEDURE — 85027 COMPLETE CBC AUTOMATED: CPT

## 2018-12-18 PROCEDURE — 93005 ELECTROCARDIOGRAM TRACING: CPT

## 2018-12-26 ENCOUNTER — HOSPITAL ENCOUNTER (OUTPATIENT)
Facility: MEDICAL CENTER | Age: 63
End: 2018-12-26
Attending: SURGERY | Admitting: SURGERY
Payer: COMMERCIAL

## 2018-12-26 VITALS
BODY MASS INDEX: 26.83 KG/M2 | HEIGHT: 70 IN | OXYGEN SATURATION: 96 % | TEMPERATURE: 98 F | DIASTOLIC BLOOD PRESSURE: 81 MMHG | RESPIRATION RATE: 16 BRPM | WEIGHT: 187.39 LBS | HEART RATE: 55 BPM | SYSTOLIC BLOOD PRESSURE: 170 MMHG

## 2018-12-26 PROCEDURE — 700111 HCHG RX REV CODE 636 W/ 250 OVERRIDE (IP)

## 2018-12-26 PROCEDURE — 700101 HCHG RX REV CODE 250

## 2018-12-26 RX ORDER — OXYCODONE HCL 5 MG/5 ML
5 SOLUTION, ORAL ORAL
Status: CANCELLED | OUTPATIENT
Start: 2018-12-26

## 2018-12-26 RX ORDER — DIPHENHYDRAMINE HYDROCHLORIDE 50 MG/ML
12.5 INJECTION INTRAMUSCULAR; INTRAVENOUS
Status: CANCELLED | OUTPATIENT
Start: 2018-12-26

## 2018-12-26 RX ORDER — SODIUM CHLORIDE, SODIUM LACTATE, POTASSIUM CHLORIDE, CALCIUM CHLORIDE 600; 310; 30; 20 MG/100ML; MG/100ML; MG/100ML; MG/100ML
INJECTION, SOLUTION INTRAVENOUS CONTINUOUS
Status: CANCELLED | OUTPATIENT
Start: 2018-12-26

## 2018-12-26 RX ORDER — ONDANSETRON 2 MG/ML
4 INJECTION INTRAMUSCULAR; INTRAVENOUS
Status: CANCELLED | OUTPATIENT
Start: 2018-12-26

## 2018-12-26 RX ORDER — MEPERIDINE HYDROCHLORIDE 25 MG/ML
6.25 INJECTION INTRAMUSCULAR; INTRAVENOUS; SUBCUTANEOUS
Status: CANCELLED | OUTPATIENT
Start: 2018-12-26

## 2018-12-26 RX ORDER — LIDOCAINE HCL/EPINEPHRINE/PF 2%-1:200K
VIAL (ML) INJECTION
Status: DISCONTINUED
Start: 2018-12-26 | End: 2018-12-26 | Stop reason: HOSPADM

## 2018-12-26 RX ORDER — BUPIVACAINE HYDROCHLORIDE 7.5 MG/ML
INJECTION, SOLUTION EPIDURAL; RETROBULBAR
Status: DISCONTINUED
Start: 2018-12-26 | End: 2018-12-26 | Stop reason: HOSPADM

## 2018-12-26 RX ORDER — SODIUM CHLORIDE, SODIUM LACTATE, POTASSIUM CHLORIDE, CALCIUM CHLORIDE 600; 310; 30; 20 MG/100ML; MG/100ML; MG/100ML; MG/100ML
INJECTION, SOLUTION INTRAVENOUS ONCE
Status: COMPLETED | OUTPATIENT
Start: 2018-12-26 | End: 2018-12-26

## 2018-12-26 RX ORDER — OXYCODONE HCL 5 MG/5 ML
10 SOLUTION, ORAL ORAL
Status: CANCELLED | OUTPATIENT
Start: 2018-12-26

## 2018-12-26 RX ORDER — HALOPERIDOL 5 MG/ML
1 INJECTION INTRAMUSCULAR
Status: CANCELLED | OUTPATIENT
Start: 2018-12-26

## 2018-12-26 RX ADMIN — SODIUM CHLORIDE, SODIUM LACTATE, POTASSIUM CHLORIDE, CALCIUM CHLORIDE: 600; 310; 30; 20 INJECTION, SOLUTION INTRAVENOUS at 11:12

## 2018-12-26 ASSESSMENT — PAIN SCALES - GENERAL: PAINLEVEL_OUTOF10: 5

## 2018-12-26 NOTE — OR NURSING
Case cancelled due to sterile pack having a hole in it and unable to get new equipment. Dr. Morel spoke with pt. Education and updates given. Pt. given block by Dr. Cabrera to left shoulder. RN monitored vital signs. VSS Pt. Wheeled out via w/c.

## 2019-01-02 ENCOUNTER — HOSPITAL ENCOUNTER (INPATIENT)
Facility: MEDICAL CENTER | Age: 64
LOS: 1 days | DRG: 483 | End: 2019-01-03
Attending: SURGERY | Admitting: SURGERY
Payer: COMMERCIAL

## 2019-01-02 ENCOUNTER — APPOINTMENT (OUTPATIENT)
Dept: RADIOLOGY | Facility: MEDICAL CENTER | Age: 64
DRG: 483 | End: 2019-01-02
Attending: SURGERY
Payer: COMMERCIAL

## 2019-01-02 DIAGNOSIS — Z96.612 STATUS POST REVERSE TOTAL REPLACEMENT OF LEFT SHOULDER: ICD-10-CM

## 2019-01-02 DIAGNOSIS — G89.18 ACUTE POST-OPERATIVE PAIN: ICD-10-CM

## 2019-01-02 PROCEDURE — 700101 HCHG RX REV CODE 250: Performed by: SURGERY

## 2019-01-02 PROCEDURE — 0LS40ZZ REPOSITION LEFT UPPER ARM TENDON, OPEN APPROACH: ICD-10-PCS | Performed by: SURGERY

## 2019-01-02 PROCEDURE — 160029 HCHG SURGERY MINUTES - 1ST 30 MINS LEVEL 4: Performed by: SURGERY

## 2019-01-02 PROCEDURE — 770001 HCHG ROOM/CARE - MED/SURG/GYN PRIV*

## 2019-01-02 PROCEDURE — 700112 HCHG RX REV CODE 229: Performed by: SURGERY

## 2019-01-02 PROCEDURE — 160048 HCHG OR STATISTICAL LEVEL 1-5: Performed by: SURGERY

## 2019-01-02 PROCEDURE — 700101 HCHG RX REV CODE 250

## 2019-01-02 PROCEDURE — 700111 HCHG RX REV CODE 636 W/ 250 OVERRIDE (IP): Performed by: SURGERY

## 2019-01-02 PROCEDURE — 700102 HCHG RX REV CODE 250 W/ 637 OVERRIDE(OP): Performed by: SURGERY

## 2019-01-02 PROCEDURE — A9270 NON-COVERED ITEM OR SERVICE: HCPCS | Performed by: ANESTHESIOLOGY

## 2019-01-02 PROCEDURE — 160036 HCHG PACU - EA ADDL 30 MINS PHASE I: Performed by: SURGERY

## 2019-01-02 PROCEDURE — 160002 HCHG RECOVERY MINUTES (STAT): Performed by: SURGERY

## 2019-01-02 PROCEDURE — 160009 HCHG ANES TIME/MIN: Performed by: SURGERY

## 2019-01-02 PROCEDURE — 0RRK00Z REPLACEMENT OF LEFT SHOULDER JOINT WITH REVERSE BALL AND SOCKET SYNTHETIC SUBSTITUTE, OPEN APPROACH: ICD-10-PCS | Performed by: SURGERY

## 2019-01-02 PROCEDURE — 700111 HCHG RX REV CODE 636 W/ 250 OVERRIDE (IP): Performed by: ANESTHESIOLOGY

## 2019-01-02 PROCEDURE — 160022 HCHG BLOCK: Performed by: SURGERY

## 2019-01-02 PROCEDURE — 0LX20ZZ TRANSFER LEFT SHOULDER TENDON, OPEN APPROACH: ICD-10-PCS | Performed by: SURGERY

## 2019-01-02 PROCEDURE — 73020 X-RAY EXAM OF SHOULDER: CPT | Mod: LT

## 2019-01-02 PROCEDURE — 502578 HCHG PACK, TOTAL HIP: Performed by: SURGERY

## 2019-01-02 PROCEDURE — A9270 NON-COVERED ITEM OR SERVICE: HCPCS | Performed by: SURGERY

## 2019-01-02 PROCEDURE — 160041 HCHG SURGERY MINUTES - EA ADDL 1 MIN LEVEL 4: Performed by: SURGERY

## 2019-01-02 PROCEDURE — 700111 HCHG RX REV CODE 636 W/ 250 OVERRIDE (IP)

## 2019-01-02 PROCEDURE — 700102 HCHG RX REV CODE 250 W/ 637 OVERRIDE(OP): Performed by: ANESTHESIOLOGY

## 2019-01-02 PROCEDURE — 160035 HCHG PACU - 1ST 60 MINS PHASE I: Performed by: SURGERY

## 2019-01-02 PROCEDURE — 501838 HCHG SUTURE GENERAL: Performed by: SURGERY

## 2019-01-02 PROCEDURE — 700105 HCHG RX REV CODE 258: Performed by: SURGERY

## 2019-01-02 PROCEDURE — 502000 HCHG MISC OR IMPLANTS RC 0278: Performed by: SURGERY

## 2019-01-02 DEVICE — IMPLANTABLE DEVICE: Type: IMPLANTABLE DEVICE | Status: FUNCTIONAL

## 2019-01-02 RX ORDER — AMLODIPINE BESYLATE 2.5 MG/1
2.5 TABLET ORAL
Status: DISCONTINUED | OUTPATIENT
Start: 2019-01-02 | End: 2019-01-03 | Stop reason: HOSPADM

## 2019-01-02 RX ORDER — OXYCODONE HCL 5 MG/5 ML
10 SOLUTION, ORAL ORAL
Status: COMPLETED | OUTPATIENT
Start: 2019-01-02 | End: 2019-01-02

## 2019-01-02 RX ORDER — BISACODYL 10 MG
10 SUPPOSITORY, RECTAL RECTAL
Status: DISCONTINUED | OUTPATIENT
Start: 2019-01-02 | End: 2019-01-03 | Stop reason: HOSPADM

## 2019-01-02 RX ORDER — GABAPENTIN 300 MG/1
300 CAPSULE ORAL ONCE
Status: COMPLETED | OUTPATIENT
Start: 2019-01-02 | End: 2019-01-02

## 2019-01-02 RX ORDER — MEPERIDINE HYDROCHLORIDE 25 MG/ML
12.5 INJECTION INTRAMUSCULAR; INTRAVENOUS; SUBCUTANEOUS
Status: DISCONTINUED | OUTPATIENT
Start: 2019-01-02 | End: 2019-01-02

## 2019-01-02 RX ORDER — OXYCODONE HYDROCHLORIDE 5 MG/1
5 TABLET ORAL
Status: DISCONTINUED | OUTPATIENT
Start: 2019-01-02 | End: 2019-01-03 | Stop reason: HOSPADM

## 2019-01-02 RX ORDER — OXYCODONE HYDROCHLORIDE 10 MG/1
10 TABLET ORAL
Status: DISCONTINUED | OUTPATIENT
Start: 2019-01-02 | End: 2019-01-03 | Stop reason: HOSPADM

## 2019-01-02 RX ORDER — OMEPRAZOLE 20 MG/1
20 CAPSULE, DELAYED RELEASE ORAL DAILY
Status: DISCONTINUED | OUTPATIENT
Start: 2019-01-02 | End: 2019-01-03 | Stop reason: HOSPADM

## 2019-01-02 RX ORDER — SODIUM CHLORIDE, SODIUM LACTATE, POTASSIUM CHLORIDE, CALCIUM CHLORIDE 600; 310; 30; 20 MG/100ML; MG/100ML; MG/100ML; MG/100ML
INJECTION, SOLUTION INTRAVENOUS ONCE
Status: COMPLETED | OUTPATIENT
Start: 2019-01-02 | End: 2019-01-02

## 2019-01-02 RX ORDER — HALOPERIDOL 5 MG/ML
1 INJECTION INTRAMUSCULAR EVERY 6 HOURS PRN
Status: DISCONTINUED | OUTPATIENT
Start: 2019-01-02 | End: 2019-01-03 | Stop reason: HOSPADM

## 2019-01-02 RX ORDER — OXYCODONE HCL 5 MG/5 ML
5 SOLUTION, ORAL ORAL
Status: COMPLETED | OUTPATIENT
Start: 2019-01-02 | End: 2019-01-02

## 2019-01-02 RX ORDER — AMOXICILLIN 250 MG
1 CAPSULE ORAL NIGHTLY
Status: DISCONTINUED | OUTPATIENT
Start: 2019-01-02 | End: 2019-01-03 | Stop reason: HOSPADM

## 2019-01-02 RX ORDER — HYDROMORPHONE HYDROCHLORIDE 1 MG/ML
0.1 INJECTION, SOLUTION INTRAMUSCULAR; INTRAVENOUS; SUBCUTANEOUS
Status: DISCONTINUED | OUTPATIENT
Start: 2019-01-02 | End: 2019-01-02

## 2019-01-02 RX ORDER — HYDROMORPHONE HYDROCHLORIDE 1 MG/ML
0.2 INJECTION, SOLUTION INTRAMUSCULAR; INTRAVENOUS; SUBCUTANEOUS
Status: DISCONTINUED | OUTPATIENT
Start: 2019-01-02 | End: 2019-01-02

## 2019-01-02 RX ORDER — SCOLOPAMINE TRANSDERMAL SYSTEM 1 MG/1
1 PATCH, EXTENDED RELEASE TRANSDERMAL
Status: DISCONTINUED | OUTPATIENT
Start: 2019-01-02 | End: 2019-01-03 | Stop reason: HOSPADM

## 2019-01-02 RX ORDER — ACETAMINOPHEN 500 MG
1000 TABLET ORAL ONCE
Status: COMPLETED | OUTPATIENT
Start: 2019-01-02 | End: 2019-01-02

## 2019-01-02 RX ORDER — HYDRALAZINE HYDROCHLORIDE 20 MG/ML
5 INJECTION INTRAMUSCULAR; INTRAVENOUS
Status: DISCONTINUED | OUTPATIENT
Start: 2019-01-02 | End: 2019-01-02

## 2019-01-02 RX ORDER — HALOPERIDOL 5 MG/ML
1 INJECTION INTRAMUSCULAR
Status: DISCONTINUED | OUTPATIENT
Start: 2019-01-02 | End: 2019-01-02

## 2019-01-02 RX ORDER — FINASTERIDE 5 MG/1
5 TABLET, FILM COATED ORAL
Status: DISCONTINUED | OUTPATIENT
Start: 2019-01-02 | End: 2019-01-03 | Stop reason: HOSPADM

## 2019-01-02 RX ORDER — POLYETHYLENE GLYCOL 3350 17 G/17G
1 POWDER, FOR SOLUTION ORAL 2 TIMES DAILY PRN
Status: DISCONTINUED | OUTPATIENT
Start: 2019-01-02 | End: 2019-01-03 | Stop reason: HOSPADM

## 2019-01-02 RX ORDER — DIPHENHYDRAMINE HYDROCHLORIDE 50 MG/ML
25 INJECTION INTRAMUSCULAR; INTRAVENOUS EVERY 6 HOURS PRN
Status: DISCONTINUED | OUTPATIENT
Start: 2019-01-02 | End: 2019-01-03 | Stop reason: HOSPADM

## 2019-01-02 RX ORDER — AMOXICILLIN 250 MG
1 CAPSULE ORAL
Status: DISCONTINUED | OUTPATIENT
Start: 2019-01-02 | End: 2019-01-03 | Stop reason: HOSPADM

## 2019-01-02 RX ORDER — DOCUSATE SODIUM 100 MG/1
100 CAPSULE, LIQUID FILLED ORAL 2 TIMES DAILY
Status: DISCONTINUED | OUTPATIENT
Start: 2019-01-02 | End: 2019-01-03 | Stop reason: HOSPADM

## 2019-01-02 RX ORDER — DEXAMETHASONE SODIUM PHOSPHATE 4 MG/ML
4 INJECTION, SOLUTION INTRA-ARTICULAR; INTRALESIONAL; INTRAMUSCULAR; INTRAVENOUS; SOFT TISSUE
Status: COMPLETED | OUTPATIENT
Start: 2019-01-02 | End: 2019-01-02

## 2019-01-02 RX ORDER — TAMSULOSIN HYDROCHLORIDE 0.4 MG/1
0.4 CAPSULE ORAL 2 TIMES DAILY
Status: DISCONTINUED | OUTPATIENT
Start: 2019-01-02 | End: 2019-01-03 | Stop reason: HOSPADM

## 2019-01-02 RX ORDER — IPRATROPIUM BROMIDE AND ALBUTEROL SULFATE 2.5; .5 MG/3ML; MG/3ML
3 SOLUTION RESPIRATORY (INHALATION)
Status: DISCONTINUED | OUTPATIENT
Start: 2019-01-02 | End: 2019-01-02

## 2019-01-02 RX ORDER — ENEMA 19; 7 G/133ML; G/133ML
1 ENEMA RECTAL
Status: DISCONTINUED | OUTPATIENT
Start: 2019-01-02 | End: 2019-01-03 | Stop reason: HOSPADM

## 2019-01-02 RX ORDER — SODIUM CHLORIDE, SODIUM LACTATE, POTASSIUM CHLORIDE, CALCIUM CHLORIDE 600; 310; 30; 20 MG/100ML; MG/100ML; MG/100ML; MG/100ML
INJECTION, SOLUTION INTRAVENOUS CONTINUOUS
Status: DISCONTINUED | OUTPATIENT
Start: 2019-01-02 | End: 2019-01-02

## 2019-01-02 RX ORDER — CEFAZOLIN SODIUM 2 G/100ML
2 INJECTION, SOLUTION INTRAVENOUS EVERY 8 HOURS
Status: COMPLETED | OUTPATIENT
Start: 2019-01-03 | End: 2019-01-03

## 2019-01-02 RX ORDER — IBUPROFEN 800 MG/1
800 TABLET ORAL
Status: DISCONTINUED | OUTPATIENT
Start: 2019-01-02 | End: 2019-01-03 | Stop reason: HOSPADM

## 2019-01-02 RX ORDER — ONDANSETRON 2 MG/ML
4 INJECTION INTRAMUSCULAR; INTRAVENOUS EVERY 4 HOURS PRN
Status: DISCONTINUED | OUTPATIENT
Start: 2019-01-02 | End: 2019-01-03 | Stop reason: HOSPADM

## 2019-01-02 RX ORDER — ACETAMINOPHEN 500 MG
1000 TABLET ORAL EVERY 6 HOURS
Status: DISCONTINUED | OUTPATIENT
Start: 2019-01-03 | End: 2019-01-03 | Stop reason: HOSPADM

## 2019-01-02 RX ORDER — ONDANSETRON 2 MG/ML
4 INJECTION INTRAMUSCULAR; INTRAVENOUS
Status: DISCONTINUED | OUTPATIENT
Start: 2019-01-02 | End: 2019-01-02

## 2019-01-02 RX ORDER — KETOROLAC TROMETHAMINE 30 MG/ML
15 INJECTION, SOLUTION INTRAMUSCULAR; INTRAVENOUS ONCE
Status: COMPLETED | OUTPATIENT
Start: 2019-01-02 | End: 2019-01-02

## 2019-01-02 RX ORDER — HYDROMORPHONE HYDROCHLORIDE 1 MG/ML
0.4 INJECTION, SOLUTION INTRAMUSCULAR; INTRAVENOUS; SUBCUTANEOUS
Status: DISCONTINUED | OUTPATIENT
Start: 2019-01-02 | End: 2019-01-02

## 2019-01-02 RX ADMIN — AMLODIPINE BESYLATE 2.5 MG: 2.5 TABLET ORAL at 23:31

## 2019-01-02 RX ADMIN — TAMSULOSIN HYDROCHLORIDE 0.4 MG: 0.4 CAPSULE ORAL at 21:34

## 2019-01-02 RX ADMIN — ONDANSETRON 4 MG: 2 INJECTION INTRAMUSCULAR; INTRAVENOUS at 21:11

## 2019-01-02 RX ADMIN — KETOROLAC TROMETHAMINE 15 MG: 30 INJECTION, SOLUTION INTRAMUSCULAR at 19:20

## 2019-01-02 RX ADMIN — SODIUM CHLORIDE, SODIUM LACTATE, POTASSIUM CHLORIDE, CALCIUM CHLORIDE 1000 ML: 600; 310; 30; 20 INJECTION, SOLUTION INTRAVENOUS at 13:56

## 2019-01-02 RX ADMIN — DIPHENHYDRAMINE HYDROCHLORIDE 25 MG: 50 INJECTION INTRAMUSCULAR; INTRAVENOUS at 22:44

## 2019-01-02 RX ADMIN — FINASTERIDE 5 MG: 5 TABLET, FILM COATED ORAL at 21:33

## 2019-01-02 RX ADMIN — Medication 5 MG: at 19:20

## 2019-01-02 RX ADMIN — DEXAMETHASONE SODIUM PHOSPHATE 4 MG: 4 INJECTION, SOLUTION INTRA-ARTICULAR; INTRALESIONAL; INTRAMUSCULAR; INTRAVENOUS; SOFT TISSUE at 22:17

## 2019-01-02 RX ADMIN — DOCUSATE SODIUM 100 MG: 100 CAPSULE, LIQUID FILLED ORAL at 21:33

## 2019-01-02 RX ADMIN — OMEPRAZOLE 20 MG: 20 CAPSULE, DELAYED RELEASE ORAL at 21:33

## 2019-01-02 RX ADMIN — GABAPENTIN 300 MG: 300 CAPSULE ORAL at 15:55

## 2019-01-02 RX ADMIN — IBUPROFEN 800 MG: 800 TABLET, FILM COATED ORAL at 21:33

## 2019-01-02 RX ADMIN — HYDROMORPHONE HYDROCHLORIDE 0.2 MG: 1 INJECTION, SOLUTION INTRAMUSCULAR; INTRAVENOUS; SUBCUTANEOUS at 19:40

## 2019-01-02 RX ADMIN — ACETAMINOPHEN 1000 MG: 500 TABLET, FILM COATED ORAL at 15:55

## 2019-01-02 RX ADMIN — STANDARDIZED SENNA CONCENTRATE AND DOCUSATE SODIUM 1 TABLET: 8.6; 5 TABLET, FILM COATED ORAL at 21:33

## 2019-01-02 ASSESSMENT — COGNITIVE AND FUNCTIONAL STATUS - GENERAL
DAILY ACTIVITIY SCORE: 16
HELP NEEDED FOR BATHING: A LITTLE
MOVING TO AND FROM BED TO CHAIR: A LITTLE
DRESSING REGULAR LOWER BODY CLOTHING: A LOT
MOVING FROM LYING ON BACK TO SITTING ON SIDE OF FLAT BED: A LITTLE
SUGGESTED CMS G CODE MODIFIER DAILY ACTIVITY: CK
PERSONAL GROOMING: A LITTLE
SUGGESTED CMS G CODE MODIFIER MOBILITY: CJ
EATING MEALS: A LITTLE
TOILETING: A LITTLE
MOBILITY SCORE: 20
TURNING FROM BACK TO SIDE WHILE IN FLAT BAD: A LITTLE
DRESSING REGULAR UPPER BODY CLOTHING: A LOT
CLIMB 3 TO 5 STEPS WITH RAILING: A LITTLE

## 2019-01-02 ASSESSMENT — PAIN SCALES - GENERAL
PAINLEVEL_OUTOF10: 4
PAINLEVEL_OUTOF10: 7
PAINLEVEL_OUTOF10: 1
PAINLEVEL_OUTOF10: 7
PAINLEVEL_OUTOF10: 4
PAINLEVEL_OUTOF10: 7
PAINLEVEL_OUTOF10: 7
PAINLEVEL_OUTOF10: 4
PAINLEVEL_OUTOF10: 4

## 2019-01-02 ASSESSMENT — COPD QUESTIONNAIRES
DO YOU EVER COUGH UP ANY MUCUS OR PHLEGM?: NO/ONLY WITH OCCASIONAL COLDS OR INFECTIONS
DURING THE PAST 4 WEEKS HOW MUCH DID YOU FEEL SHORT OF BREATH: NONE/LITTLE OF THE TIME
HAVE YOU SMOKED AT LEAST 100 CIGARETTES IN YOUR ENTIRE LIFE: YES
COPD SCREENING SCORE: 4
IN THE PAST 12 MONTHS DO YOU DO LESS THAN YOU USED TO BECAUSE OF YOUR BREATHING PROBLEMS: DISAGREE/UNSURE

## 2019-01-02 ASSESSMENT — PATIENT HEALTH QUESTIONNAIRE - PHQ9
2. FEELING DOWN, DEPRESSED, IRRITABLE, OR HOPELESS: NOT AT ALL
SUM OF ALL RESPONSES TO PHQ9 QUESTIONS 1 AND 2: 0
1. LITTLE INTEREST OR PLEASURE IN DOING THINGS: NOT AT ALL

## 2019-01-02 ASSESSMENT — LIFESTYLE VARIABLES
EVER_SMOKED: YES
ALCOHOL_USE: NO

## 2019-01-03 VITALS
BODY MASS INDEX: 26.14 KG/M2 | SYSTOLIC BLOOD PRESSURE: 108 MMHG | WEIGHT: 186.73 LBS | HEART RATE: 107 BPM | HEIGHT: 71 IN | DIASTOLIC BLOOD PRESSURE: 69 MMHG | OXYGEN SATURATION: 92 % | RESPIRATION RATE: 16 BRPM | TEMPERATURE: 97.7 F

## 2019-01-03 PROCEDURE — A9270 NON-COVERED ITEM OR SERVICE: HCPCS | Performed by: SURGERY

## 2019-01-03 PROCEDURE — 700111 HCHG RX REV CODE 636 W/ 250 OVERRIDE (IP): Performed by: SURGERY

## 2019-01-03 PROCEDURE — 700102 HCHG RX REV CODE 250 W/ 637 OVERRIDE(OP): Performed by: SURGERY

## 2019-01-03 PROCEDURE — 700112 HCHG RX REV CODE 229: Performed by: SURGERY

## 2019-01-03 RX ORDER — IBUPROFEN 800 MG/1
800 TABLET ORAL
Qty: 90 TAB | Refills: 1 | Status: SHIPPED | OUTPATIENT
Start: 2019-01-03 | End: 2020-11-02

## 2019-01-03 RX ORDER — ACETAMINOPHEN 500 MG
1000 TABLET ORAL EVERY 6 HOURS
Qty: 90 TAB | Refills: 1 | Status: SHIPPED | OUTPATIENT
Start: 2019-01-03 | End: 2020-11-02

## 2019-01-03 RX ORDER — HYDROCODONE BITARTRATE AND ACETAMINOPHEN 5; 325 MG/1; MG/1
1-2 TABLET ORAL EVERY 4 HOURS PRN
Qty: 60 TAB | Refills: 0 | Status: SHIPPED | OUTPATIENT
Start: 2019-01-03 | End: 2019-01-18

## 2019-01-03 RX ORDER — DOCUSATE SODIUM 100 MG/1
100 CAPSULE, LIQUID FILLED ORAL 2 TIMES DAILY
Qty: 60 CAP | Refills: 0 | Status: SHIPPED | OUTPATIENT
Start: 2019-01-03 | End: 2020-11-02

## 2019-01-03 RX ADMIN — ASPIRIN 81 MG: 81 TABLET, COATED ORAL at 05:26

## 2019-01-03 RX ADMIN — IBUPROFEN 800 MG: 800 TABLET, FILM COATED ORAL at 08:50

## 2019-01-03 RX ADMIN — VITAMIN D, TAB 1000IU (100/BT) 1000 UNITS: 25 TAB at 05:27

## 2019-01-03 RX ADMIN — ACETAMINOPHEN 1000 MG: 500 TABLET ORAL at 05:26

## 2019-01-03 RX ADMIN — TAMSULOSIN HYDROCHLORIDE 0.4 MG: 0.4 CAPSULE ORAL at 05:26

## 2019-01-03 RX ADMIN — DOCUSATE SODIUM 100 MG: 100 CAPSULE, LIQUID FILLED ORAL at 05:27

## 2019-01-03 RX ADMIN — CEFAZOLIN SODIUM 2 G: 2 INJECTION, SOLUTION INTRAVENOUS at 08:50

## 2019-01-03 RX ADMIN — ACETAMINOPHEN 1000 MG: 500 TABLET ORAL at 01:22

## 2019-01-03 RX ADMIN — CEFAZOLIN SODIUM 2 G: 2 INJECTION, SOLUTION INTRAVENOUS at 01:22

## 2019-01-03 ASSESSMENT — PAIN SCALES - GENERAL
PAINLEVEL_OUTOF10: 1
PAINLEVEL_OUTOF10: 4

## 2019-01-03 NOTE — DISCHARGE SUMMARY
DISCHARGE SUMMARY    PATIENTS NAME: Tristin Moscoso    MRN: [id number]    CSN: [id number]    ADMIT DATE:  1/2/2019    DISCHARGE DATE: 1/3/2019    ADMIT MD: Macro Antonio Morel M.D.    DISCHARGE MD: Marco Antonio Morel MD    REASON FOR ADMIT:right shoulder pain     PRINCIPLE DIAGNOSIS: left shoulder rotator cuff palsy and arthropathy    SECONDARY DIAGNOSIS:none    PROCEDURES: 1/2/19  Marco Antonio Morel MD Left reverse total shoulder with latissimus and pec tendon transfer     CONSULTATIONS: none    HOSPITAL COURSE: Patient is a 63 year old male with long standing left shoulder pain and rotator cuff palsy.  Patient has failed all conservative management of his pain.  Dr Marco Antonio Morel felt the only option was surgical intervention.  After explaining the indications, risks, benefits, and alternatives the patient wished to proceed with surgical intervention.  The patient was taken to the OR for the above mentioned procedure.  He had no complications and minimal blood loss. He has done well with mobilization and his pain has been well controlled with oral medications. He is now ready for DC at this time.     DISCHARGE LOCATION:home with family    DVT PROPHYLAXSIS:ambulatory    ANTIBIOTICS:completed    WEIGHT BEARING:non weight bearing left upper extremity    FOLLOW UP: 10-14 days post operatively with Dr Marco Antonio Morel MD    DISCHARGE DIAGNOSIS:Status post  Left reverse total shoulder with latissimus and pec tendon transfer     MEDICATIONS:   Current Outpatient Prescriptions   Medication Sig Dispense Refill   • HYDROcodone-acetaminophen (NORCO) 5-325 MG Tab per tablet Take 1-2 Tabs by mouth every four hours as needed for up to 15 days. 60 Tab 0   • acetaminophen (TYLENOL) 500 MG Tab Take 2 Tabs by mouth every 6 hours. 90 Tab 1   • ibuprofen (MOTRIN) 800 MG Tab Take 1 Tab by mouth 3 times a day, with meals. 90 Tab 1   • docusate sodium (COLACE) 100 MG Cap Take 1 Cap by mouth 2 times a day. 60 Cap 0

## 2019-01-03 NOTE — PROGRESS NOTES
· 2 RN skin check complete with KRYSTEN Ruvalcaba.  · Devices in place SCD, Nasal Cannula.  · Skin assessed under devices, skin intact.  · Surgical incision to L shoulder, dressing CDI, with hemovac attached to site. Skin intact otherwise.

## 2019-01-03 NOTE — OR NURSING
1847- Pt arrives from OR and report received.  Dressing to left shoulder, hemovac drain, CDI.    1907- Wife brought to bedside.  PCXR at bedside.    1910- Pt tolerating sips of water.    1917- Family at bedside, updated on admit bed.    1920- Pt given IV toradol and po oxy for pain 6-7/10.    1929- Report given to Aniya on ortho.    1940- Pt medicated with dilaudid for pain 7/10.    1955- Transport requested and pt changed into hospital gown.    2005- Transport here, pt transferred on oxygen with belongings, chart, and wife.

## 2019-01-03 NOTE — CARE PLAN
Problem: Communication  Goal: The ability to communicate needs accurately and effectively will improve  Outcome: PROGRESSING AS EXPECTED  Discussed POC with pt and family at bedside. Oriented with the room and the use of call light. Updated whiteboard. Call light within reach.    Problem: Mobility  Goal: Risk for activity intolerance will decrease  Outcome: PROGRESSING AS EXPECTED  POD#0 S/P L shoulder arthroplasty. Able to ambulate 100 feet with steady gait, SBA. Non weight bearing on L shoulder, sling in place.

## 2019-01-03 NOTE — DISCHARGE INSTRUCTIONS
Discharge Instructions    Discharged to home by car with relative. Discharged via wheelchair, hospital escort: Yes.  Special equipment needed: Not Applicable    Be sure to schedule a follow-up appointment with your primary care doctor or any specialists as instructed.     Discharge Plan:   Diet Plan: Discussed  Activity Level: Discussed  Smoking Cessation Offered: Patient Counseled  Confirmed Follow up Appointment: Patient to Call and Schedule Appointment  Confirmed Symptoms Management: Discussed  Medication Reconciliation Updated: Yes  Influenza Vaccine Indication: Not indicated: Previously immunized this influenza season and > 8 years of age    I understand that a diet low in cholesterol, fat, and sodium is recommended for good health. Unless I have been given specific instructions below for another diet, I accept this instruction as my diet prescription.   Other diet: Regular diet as tolerated     Special Instructions: Discharge instructions for the Orthopedic Patient    Follow up with Primary Care Physician within 2 weeks of discharge to home, regarding:  Review of medications and diagnostic testing.  Surveillance for medical complications.  Workup and treatment of osteoporosis, if appropriate.     -Is this a Joint Replacement patient? No    -Is this patient being discharged with medication to prevent blood clots?  No    · Is patient discharged on Warfarin / Coumadin?   No   Artroscopia de hombro  (Shoulder Arthroscopy)  La artroscopia de hombro es edwar técnica quirúrgica que se usa para evaluar y tratar lesiones en la articulación del hombro. En esta técnica, se hacen pequeños bauman (incisiones) en el hombro. A través de estas incisiones en la articulación del hombro, se introducen un pequeño dispositivo similar a un telescopio con edwar cámara con oscar en un extremo (artroscopio) y otros instrumentos quirúrgicos. [place] permite que el médico observe la articulación directamente y repare cualquier lesión al mismo  tiempo.  INFORME A SHAH MÉDICO:  · Cualquier alergia que tenga.  · Todos los medicamentos que utiliza, incluidos vitaminas, hierbas, gotas oftálmicas, cremas y medicamentos de venta mulu.  · Problemas previos que usted o los miembros de shah rochelle hayan tenido con el uso de anestésicos.  · Enfermedades de la cristi que tenga.  · Si tiene cirugías previas.  · Enfermedades que tenga.  RIESGOS Y COMPLICACIONES  En general, se trata de un procedimiento seguro. Sin embargo, pueden ocurrir algunos problemas, que incluyen los siguientes:  · Lesión en los nervios o los vasos sanguíneos.  · Hemorragia excesiva.  · Coágulos sanguíneos.  · Infección.  ANTES DEL PROCEDIMIENTO  · Consulte a shah médico acerca de estos temas:  ¨ Cambiar o suspender los medicamentos que linda habitualmente. [place] es muy importante si linda medicamentos para la diabetes o anticoagulantes.  ¨ Thony medicamentos, heather aspirina e ibuprofeno. Estos medicamentos pueden tener un efecto anticoagulante en la cristi. No tome estos medicamentos antes del procedimiento si el médico le indica que no lo ned.  · No coma ni moshe nada después de la medianoche anterior al procedimiento o según lo que le haya indicado el médico.  · Pueden hacerle un examen o estudios.  PROCEDIMIENTO  · Pueden administrarle un medicamento para dormir (anestesia general) o un medicamento para adormecer el área del hombro (anestesia local) .  · Le realizarán varias incisiones pequeñas en el hombro. A través de edwar de las incisiones, le administrarán solución salina para expandir el espacio de la articulación y, de garcia modo, el médico pueda momo el área más fácilmente.  · En edwar de las incisiones, se introducirá un artroscopio. El artroscopio envía edwar imagen a edwar pantalla de televisión para que el médico pueda examinar la articulación del hombro.  · Enmanuel el procedimiento, el médico puede detectar diversos problemas.  · Es posible que le introduzcan instrumentos a través de las otras  incisiones para reparar cualquier lesión identificada. En algunos casos, el procedimiento puede convertirse en edwar cirugía abierta si se detectan problemas que no pueden repararse con un artroscopio.  · Las incisiones luego se cerrarán con puntos o cinta adhesiva.  DESPUÉS DEL PROCEDIMIENTO  Lo llevarán al área de recuperación, donde controlarán shah evolución.  Esta información no tiene heather fin reemplazar el consejo del médico. Asegúrese de hacerle al médico cualquier pregunta que tenga.  Document Released: 07/15/2015 Document Revised: 04/10/2017 Document Reviewed: 02/06/2015  [employer] Interactive Patient Education © 2017 [employer]    Instrucciones sobre los analgésicos  (Pain Medicine Instructions)  ¿CÓMO PUEDEN AFECTARME LOS ANALGÉSICOS?  Le recetaron analgésicos. Estos medicamentos pueden causar los siguientes efectos secundarios:  · Causar cansancio o somnolencia.  · Sensación de mareo.  · Afectar shah capacidad para lo siguiente:  ¨ Conducir vehículos.  ¨ Realizar determinadas actividades.  Es posible que los analgésicos no le alivien totalmente el dolor. Debe sentirse lo suficientemente servando heather para:  · Moverse.  · Respirar.  · Cuidar de sí mismo.  ¿CON QUÉ FRECUENCIA JANICE PINA LOS ANALGÉSICOS Y QUÉ CANTIDAD JANICE PINA?  · Poneto los analgésicos solamente heather se lo haya indicado el médico y solo si los necesita para aliviar el dolor.  · No es necesario que tome analgésicos si no siente dolor, a menos que el médico se lo indique.  · Puede pina edwar dosis más baja de la que le recetaron si nota que edwar cantidad ayah de analgésico le tim el dolor.  · Si siente dolor intenso, llame al médico. No tome más comprimidos de los que el médico le haya indicado. No tome comprimidos con más frecuencia de la que el médico le haya indicado.  ¿QUÉ COSAS JANICE EVITAR MIENTRAS NKECHI ANALGÉSICOS?  Siga estas indicaciones después de empezar a pina analgésicos, mientras los está tomando y hasta 8 horas después de dejar de  tomarlos:  · No conduzca vehículos.  · No use maquinaria.  · No use herramientas eléctricas.  · No firme documentos legales.  · No moshe alcohol.  · No tome pastillas para dormir.  · No cuide a los niños solo.  · No realice actividades que le exijan trepar o estar en lugares altos.  · No se meta en el agua, a menos que haya edwar persona adulta cerca que pueda vigilarlo y ayudarlo. [place] incluye lo siguiente:  ¨ Anabel.  ¨ Sadiq.  ¨ Océanos.  ¨ Balnearios.  ¨ Piscinas.  ¿CÓMO PUEDO PROTEGER A OTRAS PERSONAS MIENTRAS ESTOY TOMANDO ANALGÉSICOS?  · Guarde los analgésicos heather se lo haya indicado el médico. Asegúrese de guardarlos en un lugar donde los niños y las mascotas no puedan alcanzarlos.  · No comparta los analgésicos con otras personas.  · No guarde los comprimidos sobrantes. Si le sobran analgésicos, deseche o destruya el remanente heather se lo haya indicado el médico.  ¿QUÉ MÁS JANICE SABER ACERCA DE LA ALLEN DE ANALGÉSICOS?  · [place] un laxante emoliente si los analgésicos le causan dificultades para defecar (estreñimiento). [place] más frutas y verduras también ayuda con el estreñimiento.  · Anote los horarios en los que allen los analgésicos. Revise los horarios antes de lottie la siguiente dosis.  · Los analgésicos pueden contener paracetamol. No tome ningún otro medicamento que contenga paracetamol mientras está tomando el que le recetaron. Edwar sobredosis de paracetamol puede causarle daños graves en el hígado. Si está tomando cualquier medicamento además del analgésico, revise qué sustancias activas contiene para saber si el paracetamol es edwar de ellas.  ¿CUÁNDO JANICE LLAMAR AL MÉDICO?  · Si el analgésico no le calma el dolor.  · Si le ocurre cualquiera de estas cosas poco después de lottie el analgésico:  ¨ Vomita.  ¨ La materia fecal es líquida (diarrea).  · Si le aparecen bruna nuevos en zonas que antes no le dolían.  · Si tiene edwar reacción alérgica al medicamento. [place] puede incluir lo  siguiente:  ¨ Picazón.  ¨ Hinchazón.  ¨ Mareos.  ¨ Mary nueva erupción cutánea.  · No puede tolerar lo siguiente:  ¨ Mareos.  ¨ Ganas de vomitar (náuseas).  ¿CUÁNDO JANICE LLAMAR  O CONCURRIR A LA NEO DE EMERGENCIA?  · [place] el conocimiento (se desmaya).  · Si está muy confundido.  · Si vomita repetidamente.  · Si la piel o los labios se le ponen pálidos o de color azulado.  · Si le ocurre lo siguiente:  ¨ Le falta el aire.  ¨ Respira mucho más lentamente que lo normal.  · Si tiene mary reacción alérgica muy grave al analgésico. [place] incluye lo siguiente:  ¨ Hinchazón de la lengua.  ¨ Dificultad para respirar.  Esta información no tiene heather fin reemplazar el consejo del médico. Asegúrese de hacerle al médico cualquier pregunta que tenga.  Document Released: 01/20/2012 Document Revised: 05/03/2016 Document Reviewed: 10/22/2015  [employer] Interactive Patient Education © 2017 [employer]    Control de infecciones en el hogar  (Infection Control in the Home)  Si tiene mary infección o está cuidando a alguien que tiene mary infección, es importante saber cómo evitar que la infección se propague. Siga estas pautas para evitar la propagación de mary infección y hable con el médico.  ¿CÓMO SE PROPAGAN LAS INFECCIONES?  Para que mary infección se propague, debe existir lo siguiente:  · Un germen. [place] puede ser un virus, mary bacteria, un hongo o un parásito.  · Un lugar donde viva el germen. [place] puede ser lo siguiente:  ¨ En mary persona, animal, planta o alimento.  ¨ En el suelo o en el agua.  ¨ En superficies heather la manija de mary ondina.  · Un anfitrión vulnerable. Se trata de mary persona o animal que no tiene resistencia (inmunidad) al germen.  · Mary manera de que el germen ingrese al anfitrión. [place] puede ocurrir de las siguientes maneras:  ¨ Contacto directo. [place] ocurre al hacer contacto (por ejemplo, bill la mano o abrazar) con mary persona o animal infectados. Algunos gérmenes también pueden viajar a través del aire  y contagiarlo si edwar persona infectada tose o estornuda sobre usted o cerca de usted.  ¨ Contacto indirecto. [place] es cuando el germen ingresa en el anfitrión a través del contacto con un objeto infectado. Los ejemplos incluyen comer alimentos contaminados, beber agua contaminada o tocar edwar superficie contaminada con las carley e inmediatamente después tocarse la ollie, la nariz o la boca.  ¿CÓMO PUEDO PREVENIR LA PROPAGACIÓN DE LA INFECCIÓN?  Hay varias cosas que puede hacer para prevenir la propagación de la infección.  Lavado de carley  Es muy importante lavarse las carley correctamente, siguiendo estos pasos:  1. Mójese las carley con agua corriente limpia.  2. Aplíquese jabón en las carley. El jabón líquido es mejor que el jabón en jorge.  3. Frótese las carley rápidamente para hacer espuma.  4. Hágalo lucila al menos 20 segundos. Refriegue servando todas las partes de las carley, incluido debajo de las uñas y entre los dedos.  5. Enjuáguese las carley con agua corriente limpia hasta que no quede jabón.  6. Séquese las carley con un secador de aire o con edwar toalla limpia de papel o anant, o deje que se sequen al aire. No use shah ropa o edwar toalla sucia para secarse.  7. Si está en un baño público, use shah propia toalla para cerrar el grifo y abrir la ondina del baño.  Asegúrese de lavarse las carley:  · Antes de:  ¨ Visitar a un bebé o a alguien con un sistema de defensa (inmunitario) debilitado o deprimido.  ¨ Ponerse y quitarse las lentes de contacto.  · Después de:  ¨ Trabajar y jugar al aire mulu.  ¨ Tocar un animal, danisha juguetes o shah davey.  ¨ Manipular ganado.  ¨ Usar el baño o ayudar a un aracelis o a un adulto a usar el baño.  ¨ Usar productos de limpieza del hogar o sustancias químicas tóxicas.  ¨ Tocar o sacar la basura.  ¨ Tocar cualquier objeto sucio en shah casa.  ¨ Manipular ropa o trapos sucios.  ¨ Cuidar a un aracelis enfermo. [place] incluye tocar pañuelos usados, juguetes y ropa.  ¨ Estornudar, toser o sonarse la  nariz.  ¨ Viajar en transporte público.  ¨ Syed la mano.  ¨ Usar un teléfono, incluido shah teléfono móvil.  ¨ Tocar dinero.  · Antes y después de:  ¨ Preparar comida.  ¨ Preparar el biberón de un bebé.  ¨ Alimentar a un bebé o a un aracelis pequeño.  ¨ Lititz.  ¨ Visitar o cuidar a alguien enfermo.  ¨ Cambiar un pañal.  ¨ Cambiar un vendaje, o cuidar de edwar lesión o herida.  ¨ Administrar o lottie medicamentos.  Cuidado del hogar  · Asegúrese de tener siempre productos de limpieza. Estos incluyen los siguientes:  ¨ Desinfectantes.  ¨ Paños de limpieza reutilizables. Lávelos después de cada uso.  ¨ Toallas de papel.  ¨ Guantes de trabajo. Reemplace los guantes si están rotos o rasgados o si se empiezan a gastar.  · Use productos que contenga lavandina de forma grant. Nunca los mezcle con otros productos de limpieza, en especial, si contienen amoníaco. Esta mezcla puede producir un gas peligroso que puede ser mortal.  · Cuide los productos de limpieza. Las escobillas de inodoro, trapeadores y esponjas pueden cultivar gérmenes. Sumérjalos en agua y lejía lucila dewayne minutos después de cada uso.  · No vierta el agua usada del trapeador en el fregadero. En lugar de eso, tírela en el inodoro.  · Mantenga edwar ventilación adecuada en el hogar.  · Si tiene edwar mascota, asegúrese de mantenerla limpia. No permita que personas con un sistema inmunitario débil toquen excremento de pájaros, el agua de la pecera ni bandejas sanitarias.  ¨ Si tiene un goyo, asegúrese de limpiar la bandeja sanitaria todos los días.  · En el baño, asegúrese de lo siguiente:  ¨ Reponer el jabón líquido.  ¨ Cambiar las toallas y las toallitas de mano con frecuencia. Evitar compartir toallas y toallitas de mano.  ¨ Cambiar los cepillos de dientes a menudo y guardarlos por separado en un lugar limpio y seco.  ¨ Desinfectar el inodoro.  ¨ Limpiar la bañera, la ducha y el fregadero con productos de limpieza estándar.  ¨ Trapear el piso con un producto de  limpieza estándar.  ¨ No compartir elementos personales, heather afeitadoras, cepillos de dientes, vasos, desodorantes, peines, cepillos, toallas y toallitas de mano.  · En la cocina, asegúrese de lo siguiente:  ¨ Almacenar los alimentos cuidadosamente.  ¨ Refrigerar las sobras de inmediato en recipientes con tapa.  ¨ Tirar alimentos rancios o en mal estado.  ¨ Limpiar el interior del refrigerador todas las semanas.  ¨ Mantener el refrigerador en 40 °F (4 °C) o menos y el congelador a edwar temperatura de 0 °F (-18 °C) o menos.  ¨ Descongelar los alimentos en el refrigerador o el microondas, no a temperatura ambiente.  ¨ Servir los alimentos a la temperatura adecuada. No comer carne cruda. Asegúrese de cocinar la carne a la temperatura adecuada. Cocinar los huevos hasta que estén firmes.  ¨ Vince las frutas y las verduras debajo del agua corriente.  ¨ Usar tablas de davey, platos y utensilios distintos para alimentos crudos y alimentos cocidos.  ¨ Mantener las superficies de trabajo limpias.  ¨ Usar edwar cuchara limpia cada vez que prueba la comida mientras cocina.  ¨ Vince la vajilla con [place] y jabonosa. Secar la vajilla al aire o usar un lavavajillas.  ¨ No compartir tenedores, tazas o cucharas lucila las comidas.  · Usar guantes si la ropa está visiblemente sucia.  · Cambiar la ropa paramjit todas las semanas o cuando esté sucia.  · No sacudir la ropa paramjit sucia. [place] puede esparcir los gérmenes por el aire. Colocar apósitos, toallitas femeninas o para la incontinencia, pañales y guantes en bolsas de basura plásticas para shah eliminación.     Esta información no tiene heather fin reemplazar el consejo del médico. Asegúrese de hacerle al médico cualquier pregunta que tenga.     Document Released: 11/30/2009 Document Revised: 01/08/2016  [employer] Interactive Patient Education ©2016 [employer]      Depression / Suicide Risk    As you are discharged from this [facility] facility, it is important to learn how  to keep safe from harming yourself.    Recognize the warning signs:  · Abrupt changes in personality, positive or negative- including increase in energy   · Giving away possessions  · Change in eating patterns- significant weight changes-  positive or negative  · Change in sleeping patterns- unable to sleep or sleeping all the time   · Unwillingness or inability to communicate  · Depression  · Unusual sadness, discouragement and loneliness  · Talk of wanting to die  · Neglect of personal appearance   · Rebelliousness- reckless behavior  · Withdrawal from people/activities they love  · Confusion- inability to concentrate     If you or a loved one observes any of these behaviors or has concerns about self-harm, here's what you can do:  · Talk about it- your feelings and reasons for harming yourself  · Remove any means that you might use to hurt yourself (examples: pills, rope, extension cords, firearm)  · Get professional help from the community (Mental Health, Substance Abuse, psychological counseling)  · Do not be alone:Call your Safe Contact- someone whom you trust who will be there for you.  · Call your local CRISIS HOTLINE 046-1179 or 237-400-5359  · Call your local Children's Mobile Crisis Response Team [place] (652) 993-1258 or www.HackMyPic  · Call the toll free National Suicide Prevention Hotlines   · National Suicide Prevention Lifeline 823-400-QJWM (6069)  · National Hope Line Network 800-SUICIDE (392-7606)

## 2019-01-03 NOTE — CARE PLAN
Problem: Safety  Goal: Will remain free from falls    Intervention: Assess risk factors for falls  Discussed safety and fall risk. Safety and fall precautions in place, call light within reach, bed locked in lowest position, non-skid socks in place, clutter-free environment, DME in bathroom. Patient verbalized understanding of safety and fall risk and uses call light appropriately for assistance.      Problem: Pain Management  Goal: Pain level will decrease to patient's comfort goal    Intervention: Follow pain managment plan developed in collaboration with patient and Interdisciplinary Team  Provided patient education on pain management using pain scale. Patient states pain is adequately controlled with scheduled Tylenol and Ibuprofen. Patient verbalized understanding of education and communicates pain level effectively with RN.      Problem: Mobility  Goal: Risk for activity intolerance will decrease    Intervention: Assess and monitor signs of activity intolerance  Patient tolerating ambulation well. Non-weight bearing to left shoulder; sling in place. Patient calls appropriately for assistance when out of bed.

## 2019-01-03 NOTE — PROGRESS NOTES
Patient discharged home per active order. Prescriptions given to patient, signed consent in chart. Discharge istructions reviewed with patient, signed copy in chart. Patient verbalized understanding of orders and all questions addressed. PIV removed, catheter intact. Patient escorted off the unit via wheelchair with all his personal belongings accompanied by his girlfriend, daughter, and CNA.

## 2019-01-04 NOTE — OP REPORT
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    PROCEDURES:  1.  Left reverse total shoulder arthroplasty.  2.  Left open biceps tenodesis.  3.  Left shoulder latissimus dorsi and teres major transfer to the greater   tuberosity (Episcopo transfer).    ANESTHESIOLOGIST:  Emma Floyd MD    ANESTHESIA:  General with upper extremity nerve block for pain control.    COMPLICATIONS:  None noted.    DRAINS:  Hemovac x1.    SPECIMENS:  None.    ESTIMATED BLOOD LOSS:  150 mL    INDICATIONS:  The patient is a 63-year-old gentleman well known to me through   my outpatient clinic for the above-mentioned diagnoses.  He believes and   agrees he failed conservative treatment and is a candidate for the   above-mentioned procedure.  Prior to the procedure, he understood the risks,   benefits and alternatives to surgery.  He understood the risks to include, but   not limited to the risk of infection requiring repeat surgery, bleeding   requiring blood transfusion, nerve, blood vessel, tendon injury requiring   repair, chronic pain, periprosthetic fracture, subsidence or loosening   requiring revision surgery, failure of the tendon transfer to provide   satisfactory external rotation, DVT, pulmonary embolism, heart attack, stroke,   and even death.  Patient states despite these risks, he wished to proceed   with surgery.    DESCRIPTION OF PROCEDURE:  Patient was met in the preoperative holding area.    The left shoulder was initialed as correct operative site.  He had an   opportunity to ask questions, all questions were answered  and informed consent   was obtained.  The patient was brought to the operating room and laid supine   on the operating table.  All bony and dependent prominences were well-padded.    Upper extremity nerve block and general anesthesia were induced without   complication.  Ancef was administered for infection prophylaxis.  The patient   was placed in a well-padded beach-chair position.  Left upper extremity was   prepped and draped in the usual sterile fashion.  A formal time-out was   performed, which all parties agreed upon the correct patient, procedure, and   operative site.  I began the procedure by making an oblique incision over the   aspect of the shoulder for deltopectoral approach, identified the cephalic   vein and it retracted medially and cauterized lateral branches.  I then   released all the pectoralis major tendon, tagging both sides for later repair.    Deep to this, I identified the long head of the biceps tendon to prevent   painful geovanni deformity and subluxation of the prosthesis.  Under resting   tension, I performed open biceps tenodesis to the lateral edge of the upper   half of the pectoralis major tendon, released proximal distal long head biceps   tendon followed into the rotator interval where I released the rotator   interval.  I excised the tendon off the superior aspect of the glenoid.  I   then identified the latissimus dorsi tendon as well as pectoralis major.    Bovie cautery released directly off of the humerus tagging it with 0 Ethibond   suture for later tendon transfer.  I then performed a longitudinal   subscapularis tenotomy dislocating the shoulder.  He had a moderate amount of   degenerative changes in the superior aspect of the humeral head.  I performed   an anatomic neck cut at approximately 30 degrees retroversion and placed the   cup protector, placed the glenoid retractors while palpating and protecting   the axillary nerve.  I circumferentially excised the labrum and  released the   capsule off the glenoid, placed the glenoid guidewire, reamed for the central   peg of the baseplate, placed using the Integra Titan reverse total shoulder   arthroplasty system, placed the 13 mm baseplate with multiple locking and   nonlocking screws, which had excellent fixation of the scapula and placed a 38   mm x 5 mm lateralized glenosphere, which also had excellent fixation of the   scapula.  I turned my attention back to the humerus.  I was able to broach up   to a size 15, which had excellent pressfit and placed this stem with standard   body, trialed up to a 13 mm polyethylene, which had excellent range of motion   and stability.  I then copiously irrigated with normal saline.  I then   repaired the subscapularis with a #5 Ti-Cron suture.  I then used a Subramanian   suture passer to pass the 0 Ethibond sutures passing the latissimus dorsi and   teres major tendon transfers posteriorly and laterally around the humerus and   sutured them both to the greater tuberosity and infraspinatus and teres minor   enthesophyte with #5 Ti-Cron suture.  I then repaired the tenotomy and   pectoralis major tendon with 0 Ethibond suture.  I then copiously irrigated   the wound with normal saline, placed one deep Hemovac drain, closed the skin   with 2-0 Monocryl suture and skin stapler, all covered with sterile Xeroform,   4x4s, Ioban and sling.  The patient awoke from anesthesia without any   complication and was transferred in a stable condition to PACU where there   were no immediate postoperative complaints.    POSTOPERATIVE PLAN:  The patient will be admitted for postoperative pain   control, likely discharged to home on postoperative day #1.       ____________________________________     MD DAJA Marc / AMANDEEP    DD:  01/03/2019 17:49:08  DT:  01/03/2019 18:28:20    D#:  [id number]  Job#:  [id number]

## 2019-02-07 ENCOUNTER — HOSPITAL ENCOUNTER (OUTPATIENT)
Dept: LAB | Facility: MEDICAL CENTER | Age: 64
End: 2019-02-07
Attending: FAMILY MEDICINE
Payer: COMMERCIAL

## 2019-02-07 LAB
ALBUMIN SERPL BCP-MCNC: 4.3 G/DL (ref 3.2–4.9)
ALBUMIN/GLOB SERPL: 1.4 G/DL
ALP SERPL-CCNC: 85 U/L (ref 30–99)
ALT SERPL-CCNC: 18 U/L (ref 2–50)
ANION GAP SERPL CALC-SCNC: 5 MMOL/L (ref 0–11.9)
AST SERPL-CCNC: 15 U/L (ref 12–45)
BILIRUB SERPL-MCNC: 0.6 MG/DL (ref 0.1–1.5)
BUN SERPL-MCNC: 22 MG/DL (ref 8–22)
CALCIUM SERPL-MCNC: 10 MG/DL (ref 8.5–10.5)
CHLORIDE SERPL-SCNC: 107 MMOL/L (ref 96–112)
CHOLEST SERPL-MCNC: 216 MG/DL (ref 100–199)
CO2 SERPL-SCNC: 27 MMOL/L (ref 20–33)
CREAT SERPL-MCNC: 1.03 MG/DL (ref 0.5–1.4)
FASTING STATUS PATIENT QL REPORTED: NORMAL
GLOBULIN SER CALC-MCNC: 3.1 G/DL (ref 1.9–3.5)
GLUCOSE SERPL-MCNC: 119 MG/DL (ref 65–99)
HDLC SERPL-MCNC: 53 MG/DL
LDLC SERPL CALC-MCNC: 129 MG/DL
POTASSIUM SERPL-SCNC: 4.2 MMOL/L (ref 3.6–5.5)
PROT SERPL-MCNC: 7.4 G/DL (ref 6–8.2)
SODIUM SERPL-SCNC: 139 MMOL/L (ref 135–145)
TRIGL SERPL-MCNC: 171 MG/DL (ref 0–149)

## 2019-02-07 PROCEDURE — 80061 LIPID PANEL: CPT

## 2019-02-07 PROCEDURE — 80053 COMPREHEN METABOLIC PANEL: CPT

## 2019-02-07 PROCEDURE — 36415 COLL VENOUS BLD VENIPUNCTURE: CPT

## 2019-03-18 ENCOUNTER — HOSPITAL ENCOUNTER (OUTPATIENT)
Dept: LAB | Facility: MEDICAL CENTER | Age: 64
End: 2019-03-18
Attending: FAMILY MEDICINE
Payer: COMMERCIAL

## 2019-03-18 LAB
ALBUMIN SERPL BCP-MCNC: 4.3 G/DL (ref 3.2–4.9)
ALBUMIN/GLOB SERPL: 1.8 G/DL
ALP SERPL-CCNC: 86 U/L (ref 30–99)
ALT SERPL-CCNC: 20 U/L (ref 2–50)
ANION GAP SERPL CALC-SCNC: 5 MMOL/L (ref 0–11.9)
AST SERPL-CCNC: 22 U/L (ref 12–45)
BILIRUB SERPL-MCNC: 0.5 MG/DL (ref 0.1–1.5)
BUN SERPL-MCNC: 15 MG/DL (ref 8–22)
CALCIUM SERPL-MCNC: 9.2 MG/DL (ref 8.5–10.5)
CHLORIDE SERPL-SCNC: 107 MMOL/L (ref 96–112)
CHOLEST SERPL-MCNC: 153 MG/DL (ref 100–199)
CO2 SERPL-SCNC: 27 MMOL/L (ref 20–33)
CREAT SERPL-MCNC: 1 MG/DL (ref 0.5–1.4)
EST. AVERAGE GLUCOSE BLD GHB EST-MCNC: 120 MG/DL
GLOBULIN SER CALC-MCNC: 2.4 G/DL (ref 1.9–3.5)
GLUCOSE SERPL-MCNC: 107 MG/DL (ref 65–99)
HBA1C MFR BLD: 5.8 % (ref 0–5.6)
HDLC SERPL-MCNC: 50 MG/DL
LDLC SERPL CALC-MCNC: 77 MG/DL
POTASSIUM SERPL-SCNC: 3.7 MMOL/L (ref 3.6–5.5)
PROT SERPL-MCNC: 6.7 G/DL (ref 6–8.2)
SODIUM SERPL-SCNC: 139 MMOL/L (ref 135–145)
TRIGL SERPL-MCNC: 129 MG/DL (ref 0–149)

## 2019-03-18 PROCEDURE — 80053 COMPREHEN METABOLIC PANEL: CPT

## 2019-03-18 PROCEDURE — 80061 LIPID PANEL: CPT

## 2019-03-18 PROCEDURE — 83036 HEMOGLOBIN GLYCOSYLATED A1C: CPT

## 2019-03-18 PROCEDURE — 36415 COLL VENOUS BLD VENIPUNCTURE: CPT

## 2019-06-11 ENCOUNTER — TELEPHONE (OUTPATIENT)
Dept: SCHEDULING | Facility: IMAGING CENTER | Age: 64
End: 2019-06-11

## 2019-08-15 ENCOUNTER — HOSPITAL ENCOUNTER (OUTPATIENT)
Dept: LAB | Facility: MEDICAL CENTER | Age: 64
End: 2019-08-15
Payer: COMMERCIAL

## 2019-08-15 LAB — PSA SERPL-MCNC: 0.87 NG/ML (ref 0–4)

## 2019-08-15 PROCEDURE — 36415 COLL VENOUS BLD VENIPUNCTURE: CPT

## 2019-08-15 PROCEDURE — 84153 ASSAY OF PSA TOTAL: CPT

## 2019-09-12 ENCOUNTER — DOCUMENTATION (OUTPATIENT)
Dept: OCCUPATIONAL MEDICINE | Facility: CLINIC | Age: 64
End: 2019-09-12

## 2019-09-24 ENCOUNTER — IMMUNIZATION (OUTPATIENT)
Dept: OCCUPATIONAL MEDICINE | Facility: CLINIC | Age: 64
End: 2019-09-24

## 2019-09-24 DIAGNOSIS — Z23 NEED FOR VACCINATION: ICD-10-CM

## 2019-09-25 PROCEDURE — 90686 IIV4 VACC NO PRSV 0.5 ML IM: CPT | Performed by: PREVENTIVE MEDICINE

## 2019-09-27 ENCOUNTER — HOSPITAL ENCOUNTER (OUTPATIENT)
Dept: LAB | Facility: MEDICAL CENTER | Age: 64
End: 2019-09-27
Attending: FAMILY MEDICINE
Payer: COMMERCIAL

## 2019-09-27 LAB
ALBUMIN SERPL BCP-MCNC: 4.6 G/DL (ref 3.2–4.9)
ALBUMIN/GLOB SERPL: 1.9 G/DL
ALP SERPL-CCNC: 73 U/L (ref 30–99)
ALT SERPL-CCNC: 24 U/L (ref 2–50)
ANION GAP SERPL CALC-SCNC: 9 MMOL/L (ref 0–11.9)
AST SERPL-CCNC: 21 U/L (ref 12–45)
BILIRUB SERPL-MCNC: 0.6 MG/DL (ref 0.1–1.5)
BUN SERPL-MCNC: 20 MG/DL (ref 8–22)
CALCIUM SERPL-MCNC: 9.5 MG/DL (ref 8.5–10.5)
CHLORIDE SERPL-SCNC: 105 MMOL/L (ref 96–112)
CHOLEST SERPL-MCNC: 162 MG/DL (ref 100–199)
CO2 SERPL-SCNC: 26 MMOL/L (ref 20–33)
CREAT SERPL-MCNC: 1.13 MG/DL (ref 0.5–1.4)
EST. AVERAGE GLUCOSE BLD GHB EST-MCNC: 126 MG/DL
GLOBULIN SER CALC-MCNC: 2.4 G/DL (ref 1.9–3.5)
GLUCOSE SERPL-MCNC: 104 MG/DL (ref 65–99)
HBA1C MFR BLD: 6 % (ref 0–5.6)
HDLC SERPL-MCNC: 53 MG/DL
LDLC SERPL CALC-MCNC: 90 MG/DL
POTASSIUM SERPL-SCNC: 3.9 MMOL/L (ref 3.6–5.5)
PROT SERPL-MCNC: 7 G/DL (ref 6–8.2)
SODIUM SERPL-SCNC: 140 MMOL/L (ref 135–145)
TRIGL SERPL-MCNC: 95 MG/DL (ref 0–149)

## 2019-09-27 PROCEDURE — 36415 COLL VENOUS BLD VENIPUNCTURE: CPT

## 2019-09-27 PROCEDURE — 80053 COMPREHEN METABOLIC PANEL: CPT

## 2019-09-27 PROCEDURE — 80061 LIPID PANEL: CPT

## 2019-09-27 PROCEDURE — 83036 HEMOGLOBIN GLYCOSYLATED A1C: CPT

## 2020-04-02 ENCOUNTER — HOSPITAL ENCOUNTER (OUTPATIENT)
Dept: LAB | Facility: MEDICAL CENTER | Age: 65
End: 2020-04-02
Attending: FAMILY MEDICINE
Payer: COMMERCIAL

## 2020-04-02 LAB
ALBUMIN SERPL BCP-MCNC: 4.3 G/DL (ref 3.2–4.9)
ALBUMIN/GLOB SERPL: 1.5 G/DL
ALP SERPL-CCNC: 74 U/L (ref 30–99)
ALT SERPL-CCNC: 26 U/L (ref 2–50)
ANION GAP SERPL CALC-SCNC: 10 MMOL/L (ref 7–16)
AST SERPL-CCNC: 19 U/L (ref 12–45)
BILIRUB SERPL-MCNC: 0.4 MG/DL (ref 0.1–1.5)
BUN SERPL-MCNC: 18 MG/DL (ref 8–22)
CALCIUM SERPL-MCNC: 9.5 MG/DL (ref 8.5–10.5)
CHLORIDE SERPL-SCNC: 104 MMOL/L (ref 96–112)
CHOLEST SERPL-MCNC: 171 MG/DL (ref 100–199)
CO2 SERPL-SCNC: 26 MMOL/L (ref 20–33)
CREAT SERPL-MCNC: 1.01 MG/DL (ref 0.5–1.4)
GLOBULIN SER CALC-MCNC: 2.8 G/DL (ref 1.9–3.5)
GLUCOSE SERPL-MCNC: 91 MG/DL (ref 65–99)
HDLC SERPL-MCNC: 59 MG/DL
LDLC SERPL CALC-MCNC: 86 MG/DL
POTASSIUM SERPL-SCNC: 4.2 MMOL/L (ref 3.6–5.5)
PROT SERPL-MCNC: 7.1 G/DL (ref 6–8.2)
SODIUM SERPL-SCNC: 140 MMOL/L (ref 135–145)
TRIGL SERPL-MCNC: 128 MG/DL (ref 0–149)

## 2020-04-02 PROCEDURE — 80053 COMPREHEN METABOLIC PANEL: CPT

## 2020-04-02 PROCEDURE — 36415 COLL VENOUS BLD VENIPUNCTURE: CPT

## 2020-04-02 PROCEDURE — 80061 LIPID PANEL: CPT

## 2020-04-02 PROCEDURE — 83036 HEMOGLOBIN GLYCOSYLATED A1C: CPT

## 2020-04-03 LAB
EST. AVERAGE GLUCOSE BLD GHB EST-MCNC: 120 MG/DL
HBA1C MFR BLD: 5.8 % (ref 0–5.6)

## 2020-04-28 ENCOUNTER — EH NON-PROVIDER (OUTPATIENT)
Dept: OCCUPATIONAL MEDICINE | Facility: CLINIC | Age: 65
End: 2020-04-28

## 2020-04-28 PROCEDURE — 94375 RESPIRATORY FLOW VOLUME LOOP: CPT | Performed by: INTERNAL MEDICINE

## 2020-06-10 ENCOUNTER — HOSPITAL ENCOUNTER (OUTPATIENT)
Dept: RADIOLOGY | Facility: MEDICAL CENTER | Age: 65
End: 2020-06-10
Attending: FAMILY MEDICINE
Payer: COMMERCIAL

## 2020-06-10 DIAGNOSIS — N50.82 SCROTAL PAIN: ICD-10-CM

## 2020-06-10 PROCEDURE — 76870 US EXAM SCROTUM: CPT

## 2020-08-24 ENCOUNTER — HOSPITAL ENCOUNTER (OUTPATIENT)
Dept: LAB | Facility: MEDICAL CENTER | Age: 65
End: 2020-08-24
Attending: PHYSICIAN ASSISTANT
Payer: COMMERCIAL

## 2020-08-24 LAB — PSA SERPL-MCNC: 1.6 NG/ML (ref 0–4)

## 2020-08-24 PROCEDURE — 84153 ASSAY OF PSA TOTAL: CPT

## 2020-08-24 PROCEDURE — 36415 COLL VENOUS BLD VENIPUNCTURE: CPT

## 2020-08-27 ENCOUNTER — HOSPITAL ENCOUNTER (OUTPATIENT)
Facility: MEDICAL CENTER | Age: 65
End: 2020-08-27
Attending: PHYSICIAN ASSISTANT
Payer: COMMERCIAL

## 2020-08-27 PROCEDURE — 87186 SC STD MICRODIL/AGAR DIL: CPT

## 2020-08-27 PROCEDURE — 87077 CULTURE AEROBIC IDENTIFY: CPT

## 2020-08-27 PROCEDURE — 87086 URINE CULTURE/COLONY COUNT: CPT

## 2020-09-21 ENCOUNTER — HOSPITAL ENCOUNTER (OUTPATIENT)
Facility: MEDICAL CENTER | Age: 65
End: 2020-09-21
Attending: FAMILY MEDICINE
Payer: COMMERCIAL

## 2020-09-21 PROCEDURE — 87077 CULTURE AEROBIC IDENTIFY: CPT

## 2020-09-21 PROCEDURE — 87086 URINE CULTURE/COLONY COUNT: CPT

## 2020-09-21 PROCEDURE — 87186 SC STD MICRODIL/AGAR DIL: CPT

## 2020-10-08 ENCOUNTER — HOSPITAL ENCOUNTER (OUTPATIENT)
Dept: LAB | Facility: MEDICAL CENTER | Age: 65
End: 2020-10-08
Attending: FAMILY MEDICINE
Payer: COMMERCIAL

## 2020-10-08 LAB
ALBUMIN SERPL BCP-MCNC: 4.1 G/DL (ref 3.2–4.9)
ALBUMIN/GLOB SERPL: 1.5 G/DL
ALP SERPL-CCNC: 79 U/L (ref 30–99)
ALT SERPL-CCNC: 28 U/L (ref 2–50)
ANION GAP SERPL CALC-SCNC: 9 MMOL/L (ref 7–16)
AST SERPL-CCNC: 19 U/L (ref 12–45)
BILIRUB SERPL-MCNC: 0.7 MG/DL (ref 0.1–1.5)
BUN SERPL-MCNC: 22 MG/DL (ref 8–22)
CALCIUM SERPL-MCNC: 9 MG/DL (ref 8.5–10.5)
CHLORIDE SERPL-SCNC: 103 MMOL/L (ref 96–112)
CHOLEST SERPL-MCNC: 162 MG/DL (ref 100–199)
CO2 SERPL-SCNC: 24 MMOL/L (ref 20–33)
CREAT SERPL-MCNC: 0.95 MG/DL (ref 0.5–1.4)
EST. AVERAGE GLUCOSE BLD GHB EST-MCNC: 120 MG/DL
FASTING STATUS PATIENT QL REPORTED: NORMAL
GLOBULIN SER CALC-MCNC: 2.7 G/DL (ref 1.9–3.5)
GLUCOSE SERPL-MCNC: 117 MG/DL (ref 65–99)
HBA1C MFR BLD: 5.8 % (ref 0–5.6)
HDLC SERPL-MCNC: 57 MG/DL
LDLC SERPL CALC-MCNC: 85 MG/DL
POTASSIUM SERPL-SCNC: 3.9 MMOL/L (ref 3.6–5.5)
PROT SERPL-MCNC: 6.8 G/DL (ref 6–8.2)
SODIUM SERPL-SCNC: 136 MMOL/L (ref 135–145)
TRIGL SERPL-MCNC: 101 MG/DL (ref 0–149)

## 2020-10-08 PROCEDURE — 83036 HEMOGLOBIN GLYCOSYLATED A1C: CPT

## 2020-10-08 PROCEDURE — 80061 LIPID PANEL: CPT

## 2020-10-08 PROCEDURE — 36415 COLL VENOUS BLD VENIPUNCTURE: CPT

## 2020-10-08 PROCEDURE — 80053 COMPREHEN METABOLIC PANEL: CPT

## 2020-10-12 ENCOUNTER — HOSPITAL ENCOUNTER (OUTPATIENT)
Dept: LAB | Facility: MEDICAL CENTER | Age: 65
End: 2020-10-12
Attending: FAMILY MEDICINE
Payer: COMMERCIAL

## 2020-10-12 LAB
APPEARANCE UR: ABNORMAL
BACTERIA #/AREA URNS HPF: ABNORMAL /HPF
BILIRUB UR QL STRIP.AUTO: NEGATIVE
COLOR UR: YELLOW
EPI CELLS #/AREA URNS HPF: NEGATIVE /HPF
GLUCOSE UR STRIP.AUTO-MCNC: NEGATIVE MG/DL
HYALINE CASTS #/AREA URNS LPF: ABNORMAL /LPF
KETONES UR STRIP.AUTO-MCNC: NEGATIVE MG/DL
LEUKOCYTE ESTERASE UR QL STRIP.AUTO: NEGATIVE
MICRO URNS: ABNORMAL
NITRITE UR QL STRIP.AUTO: POSITIVE
PH UR STRIP.AUTO: 5.5 [PH] (ref 5–8)
PROT UR QL STRIP: NEGATIVE MG/DL
RBC # URNS HPF: ABNORMAL /HPF
RBC UR QL AUTO: NEGATIVE
SP GR UR STRIP.AUTO: 1.03
UROBILINOGEN UR STRIP.AUTO-MCNC: 0.2 MG/DL
WBC #/AREA URNS HPF: ABNORMAL /HPF

## 2020-10-12 PROCEDURE — 87077 CULTURE AEROBIC IDENTIFY: CPT

## 2020-10-12 PROCEDURE — 87086 URINE CULTURE/COLONY COUNT: CPT

## 2020-10-12 PROCEDURE — 81001 URINALYSIS AUTO W/SCOPE: CPT

## 2020-10-12 PROCEDURE — 87186 SC STD MICRODIL/AGAR DIL: CPT

## 2020-11-02 ENCOUNTER — OFFICE VISIT (OUTPATIENT)
Dept: MEDICAL GROUP | Facility: PHYSICIAN GROUP | Age: 65
End: 2020-11-02
Payer: COMMERCIAL

## 2020-11-02 VITALS
DIASTOLIC BLOOD PRESSURE: 70 MMHG | TEMPERATURE: 97.1 F | SYSTOLIC BLOOD PRESSURE: 112 MMHG | WEIGHT: 196 LBS | RESPIRATION RATE: 16 BRPM | OXYGEN SATURATION: 96 % | HEIGHT: 70 IN | HEART RATE: 84 BPM | BODY MASS INDEX: 28.06 KG/M2

## 2020-11-02 DIAGNOSIS — I10 ESSENTIAL HYPERTENSION: ICD-10-CM

## 2020-11-02 DIAGNOSIS — E78.00 HYPERCHOLESTEREMIA: ICD-10-CM

## 2020-11-02 DIAGNOSIS — N40.0 BENIGN PROSTATIC HYPERPLASIA WITHOUT LOWER URINARY TRACT SYMPTOMS: ICD-10-CM

## 2020-11-02 DIAGNOSIS — K21.9 GASTROESOPHAGEAL REFLUX DISEASE WITHOUT ESOPHAGITIS: ICD-10-CM

## 2020-11-02 DIAGNOSIS — Z12.12 SCREENING FOR COLORECTAL CANCER: ICD-10-CM

## 2020-11-02 DIAGNOSIS — Z12.11 SCREENING FOR COLORECTAL CANCER: ICD-10-CM

## 2020-11-02 PROCEDURE — 99204 OFFICE O/P NEW MOD 45 MIN: CPT | Performed by: FAMILY MEDICINE

## 2020-11-02 RX ORDER — AMLODIPINE BESYLATE AND BENAZEPRIL HYDROCHLORIDE 5; 20 MG/1; MG/1
1 CAPSULE ORAL DAILY
COMMUNITY
End: 2021-08-03 | Stop reason: SDUPTHER

## 2020-11-02 RX ORDER — CEFDINIR 300 MG/1
CAPSULE ORAL
COMMUNITY
Start: 2020-10-19 | End: 2020-11-02

## 2020-11-02 RX ORDER — LEVOFLOXACIN 750 MG/1
TABLET, FILM COATED ORAL
COMMUNITY
End: 2020-11-02

## 2020-11-02 RX ORDER — POTASSIUM CHLORIDE 750 MG/1
10 TABLET, EXTENDED RELEASE ORAL
COMMUNITY
Start: 2020-10-23 | End: 2020-11-02

## 2020-11-02 RX ORDER — HYDROCHLOROTHIAZIDE 25 MG/1
25 TABLET ORAL
COMMUNITY
Start: 2020-08-21 | End: 2020-11-02

## 2020-11-02 RX ORDER — ATORVASTATIN CALCIUM 10 MG/1
10 TABLET, FILM COATED ORAL NIGHTLY
COMMUNITY
End: 2021-07-07

## 2020-11-02 ASSESSMENT — PATIENT HEALTH QUESTIONNAIRE - PHQ9: CLINICAL INTERPRETATION OF PHQ2 SCORE: 0

## 2020-11-02 ASSESSMENT — FIBROSIS 4 INDEX: FIB4 SCORE: 1.02

## 2020-11-02 NOTE — PROGRESS NOTES
Subjective:     CC:  Diagnoses of Essential hypertension, Hypercholesteremia, Benign prostatic hyperplasia without lower urinary tract symptoms, Gastroesophageal reflux disease without esophagitis, and Screening for colorectal cancer were pertinent to this visit.    HISTORY OF THE PRESENT ILLNESS: Patient is a 65 y.o. male. This pleasant patient is here today to establish care and discuss the following problems.   Prior PCP: Dr. Rincon    Essential hypertension  Stable. Monitoring BP at home. Currently taking lotrel as directed. Also taking baby aspirin. Denies lightheadedness, vision changes, headache, palpitations or leg swelling.      Hypercholesteremia  This is a chronic issue  On atorvastatin 10mg daily  Last lipid panel 10/20 and wnl     BPH (benign prostatic hyperplasia)  This is a chronic issue  Follows up with urology  On finasteride 5mg qHS and Tamsulosin 0.4mg BID  Well controlled    Gastroesophageal reflux disease without esophagitis  This is a chronic issue  On PPI which controls it well.       Allergies: Kiwi extract, Penicillins, and Sulfa drugs    Current Outpatient Medications Ordered in Epic   Medication Sig Dispense Refill   • amlodipine-benazepril (LOTREL) 5-20 MG per capsule Take 1 Cap by mouth every day.     • atorvastatin (LIPITOR) 10 MG Tab Take 10 mg by mouth every evening.     • omeprazole (PRILOSEC) 20 MG delayed-release capsule TAKE ONE CAPSULE BY MOUTH EVERY DAY 90 Cap 0   • aspirin EC (ECOTRIN) 81 MG Tablet Delayed Response Take 81 mg by mouth every day.     • finasteride (PROSCAR) 5 MG Tab Take 5 mg by mouth every bedtime.  1   • tamsulosin (FLOMAX) 0.4 MG capsule Take 0.4 mg by mouth 2 Times a Day.  4     No current [facility]-ordered facility-administered medications on file.        Past Medical History:   Diagnosis Date   • Blood transfusion without reported diagnosis 11 yoa    during surgery/  In [place]   • Cataract     s/p surgery christi   • Dental disorder     full upper denture    •  Heart burn    • High cholesterol    • Hyperlipidemia    • Kidney disease     kidney stone at 10 yoa   • Snoring    • Stroke (HCC) 6/2013    see ER records; anant consult after. presented with left sided weakness. takes 81mg daily       Past Surgical History:   Procedure Laterality Date   • SHOULDER ARTHROPLASTY TOTAL Left 1/2/2019    Procedure: SHOULDER ARTHROPLASTY TOTAL - REVERSE W/LATISSIMUS TRANSFER;  Surgeon: Marco Antonio Morel M.D.;  Location: SURGERY SAME DAY [facility] ORS;  Service: Orthopedics   • VITRECTOMY POSTERIOR Right 6/4/2018    Procedure: VITRECTOMY POSTERIOR-  ENDO LASER, AIR FLUID EXCHANGE, SF6 INTRAOCULAR GAS;  Surgeon: Rolf Bee M.D.;  Location: SURGERY SAME DAY [facility] ORS;  Service: Ophthalmology   • KNEE ARTHROSCOPY  8/28/2012    Performed by ERIK MUHAMMAD at SURGERY SAME DAY [facility] ORS   • MEDIAL MENISCECTOMY  8/28/2012    Performed by ERIK MUHAMMAD at SURGERY SAME DAY [facility] ORS   • OTHER  1983    kidney stones   • ABDOMINAL EXPLORATION     • EYE SURGERY      cataract bilateral-had lens   • OTHER      lense implants 6-7 years       Social History     Tobacco Use   • Smoking status: Current Every Day Smoker     Packs/day: 0.25     Years: 30.00     Pack years: 7.50     Types: Cigarettes, Cigars   • Smokeless tobacco: Never Used   • Tobacco comment: 1-3 cig/day.  Patient states in the past he smoked a pack a day.   Substance Use Topics   • Alcohol use: No     Alcohol/week: 0.0 oz   • Drug use: No       Social History     Social History Narrative    Works maintenance at [employer].  .       Family History   Problem Relation Age of Onset   • Seizures Mother    • Cancer Sister         eldest sister-lung/chest       Health Maintenance: Completed    ROS:   Gen: no fevers/chills, no changes in weight  Eyes: no changes in vision  ENT: no sore throat, no hearing loss, no bloody nose  Pulm: no sob, no cough  CV: no chest pain, no palpitations  GI: no nausea/vomiting, no  "diarrhea  : no dysuria  MSk: no myalgias  Skin: no rash  Neuro: no headaches, no numbness/tingling  Heme/Lymph: no easy bruising      Objective:     Exam: /70 (BP Location: Left arm, Patient Position: Sitting)   Pulse 84   Temp 36.2 °C (97.1 °F) (Temporal)   Resp 16   Ht 1.778 m (5' 10\")   Wt 88.9 kg (196 lb)   SpO2 96%  Body mass index is 28.12 kg/m².    General: Normal appearing. No distress.  HENT: Normocephalic. Ears normal shape and contour, canals are clear bilaterally, tympanic membranes are benign, nasal mucosa benign, oropharynx is without erythema, edema or exudates.   Eyes: Conjunctiva clear lids without ptosis, pupils equal and reactive to light accommodation.  Neck: Supple without JVD. Thyroid is not enlarged.  Pulmonary: Clear to ausculation.  Normal effort. No rales, ronchi, or wheezing.  Cardiovascular: Regular rate and rhythm without murmur. Radial pulses are intact and equal bilaterally.  Abdomen: Soft, nontender, nondistended. Normal bowel sounds. Liver and spleen are not palpable  Neurologic: Moves all extremities  Lymph: No cervical or supraclavicular lymph nodes are palpable  Skin: Warm and dry.  No obvious lesions.  Musculoskeletal: Normal gait. No extremity cyanosis, clubbing, or edema.  Psych: Normal mood and affect. Alert and oriented x3. Judgment and insight is normal.    Assessment & Plan:   65 y.o. male with the following -    1. Essential hypertension  This is a chronic, stable condition.  Blood pressure is well controlled with Lotrel once daily.  No refills requested today.  Blood pressure in the office today is 112/70.    2. Hypercholesteremia  This is a chronic, stable condition.  Well-controlled with atorvastatin 10 mg daily  Last lipid panel was October 2020 within normal limits    3. Benign prostatic hyperplasia without lower urinary tract symptoms  This is a chronic, stable condition.  Follows up with urology.  On finasteride and Flomax.    4. Gastroesophageal " reflux disease without esophagitis  This is a chronic, stable condition.  On omeprazole 20 mg daily with good outcomes.    5. Screening for colorectal cancer  - REFERRAL TO GI FOR COLONOSCOPY      Return in about 6 months (around 5/2/2021).    Please note that this dictation was created using voice recognition software. I have made every reasonable attempt to correct obvious errors, but I expect that there are errors of grammar and possibly content that I did not discover before finalizing the note.

## 2020-11-02 NOTE — ASSESSMENT & PLAN NOTE
Stable. Monitoring BP at home. Currently taking lotrel as directed. Also taking baby aspirin. Denies lightheadedness, vision changes, headache, palpitations or leg swelling.

## 2020-11-02 NOTE — ASSESSMENT & PLAN NOTE
This is a chronic issue  Follows up with urology  On finasteride 5mg qHS and Tamsulosin 0.4mg BID  Well controlled

## 2020-12-20 DIAGNOSIS — Z23 NEED FOR VACCINATION: ICD-10-CM

## 2020-12-22 ENCOUNTER — APPOINTMENT (OUTPATIENT)
Dept: FAMILY PLANNING/WOMEN'S HEALTH CLINIC | Facility: IMMUNIZATION CENTER | Age: 65
End: 2020-12-22
Attending: FAMILY MEDICINE

## 2020-12-22 PROCEDURE — 91300 PFIZER SARS-COV-2 VACCINE: CPT

## 2020-12-22 PROCEDURE — 0001A PFIZER SARS-COV-2 VACCINE: CPT

## 2020-12-23 ENCOUNTER — IMMUNIZATION (OUTPATIENT)
Dept: FAMILY PLANNING/WOMEN'S HEALTH CLINIC | Facility: IMMUNIZATION CENTER | Age: 65
End: 2020-12-23

## 2020-12-23 DIAGNOSIS — Z23 ENCOUNTER FOR VACCINATION: Primary | ICD-10-CM

## 2020-12-28 ENCOUNTER — HOSPITAL ENCOUNTER (OUTPATIENT)
Facility: MEDICAL CENTER | Age: 65
End: 2020-12-28
Attending: PREVENTIVE MEDICINE
Payer: COMMERCIAL

## 2020-12-28 ENCOUNTER — EH NON-PROVIDER (OUTPATIENT)
Dept: OCCUPATIONAL MEDICINE | Facility: CLINIC | Age: 65
End: 2020-12-28

## 2020-12-28 DIAGNOSIS — Z11.59 ENCOUNTER FOR SCREENING FOR OTHER VIRAL DISEASES: ICD-10-CM

## 2020-12-28 PROCEDURE — U0003 INFECTIOUS AGENT DETECTION BY NUCLEIC ACID (DNA OR RNA); SEVERE ACUTE RESPIRATORY SYNDROME CORONAVIRUS 2 (SARS-COV-2) (CORONAVIRUS DISEASE [COVID-19]), AMPLIFIED PROBE TECHNIQUE, MAKING USE OF HIGH THROUGHPUT TECHNOLOGIES AS DESCRIBED BY CMS-2020-01-R: HCPCS | Performed by: PREVENTIVE MEDICINE

## 2020-12-29 ENCOUNTER — OFFICE VISIT (OUTPATIENT)
Dept: MEDICAL GROUP | Facility: PHYSICIAN GROUP | Age: 65
End: 2020-12-29
Payer: COMMERCIAL

## 2020-12-29 VITALS — HEIGHT: 70 IN | WEIGHT: 196 LBS | BODY MASS INDEX: 28.06 KG/M2 | TEMPERATURE: 98.6 F

## 2020-12-29 DIAGNOSIS — Z11.59 ENCOUNTER FOR SCREENING FOR OTHER VIRAL DISEASES: ICD-10-CM

## 2020-12-29 DIAGNOSIS — R05.9 COUGH: ICD-10-CM

## 2020-12-29 LAB
COVID ORDER STATUS COVID19: NORMAL
SARS-COV-2 RNA RESP QL NAA+PROBE: DETECTED
SPECIMEN SOURCE: ABNORMAL

## 2020-12-29 PROCEDURE — 99441 PR PHYSICIAN TELEPHONE EVALUATION 5-10 MIN: CPT | Mod: CR | Performed by: FAMILY MEDICINE

## 2020-12-29 RX ORDER — BENZONATATE 100 MG/1
100-200 CAPSULE ORAL 3 TIMES DAILY PRN
Qty: 100 CAP | Refills: 0 | Status: SHIPPED | OUTPATIENT
Start: 2020-12-29 | End: 2021-08-03 | Stop reason: SDUPTHER

## 2020-12-29 NOTE — PROGRESS NOTES
Telephone Appointment Visit   As a means of avoiding spread of COVID-19, this visit is being conducted by telephone. This telephone visit was initiated by the patient and they verbally consented.    Visit was done with the aid of a , [place],  ID [id number].    Time at start of call: 14:50    Reason for Call:  Lab Follow-up and cough    HPI:    He was prescribed a medication for cough. He cannot recall the name of the medication and would like to pick it up.      He has had the cough for a week and he did get a COVID test with occupational health yesterday. He was hoping for the results today but they are not resulted yet. He got the COVID vaccine on 12/22/2020.    Labs / Images Reviewed:   COVID test 12/28 - pending     Assessment and Plan:     1. Cough  This is an acute condition.  He is had a cough for about a week and is very concerned it may be COVID-19.  He did get tested yesterday but the results are still pending.  He was told to  a cough medicine but he cannot remember the name of it and his cough is very bothersome so he would like something prescribed today.  I did prescribe Tessalon Perles and I did inform him that my chart will have his COVID-19 results within an hour of it resulting.    Follow-up: prn    Time at end of call: 14:57  Total Time Spent: 5-10 minutes    Ambika Kumar M.D.

## 2020-12-30 ENCOUNTER — TELEPHONE (OUTPATIENT)
Dept: OCCUPATIONAL MEDICINE | Facility: CLINIC | Age: 65
End: 2020-12-30

## 2020-12-30 NOTE — TELEPHONE ENCOUNTER
Notified pt. of POSITIVE COVID-19 test. Advised pt. not to return to work and to call employee screening line at [phone] for further instructions.

## 2021-01-04 ENCOUNTER — TELEMEDICINE (OUTPATIENT)
Dept: MEDICAL GROUP | Facility: PHYSICIAN GROUP | Age: 66
End: 2021-01-04
Payer: COMMERCIAL

## 2021-01-04 VITALS — WEIGHT: 195 LBS | BODY MASS INDEX: 27.92 KG/M2 | HEIGHT: 70 IN

## 2021-01-04 DIAGNOSIS — U07.1 LAB TEST POSITIVE FOR DETECTION OF COVID-19 VIRUS: ICD-10-CM

## 2021-01-04 DIAGNOSIS — R05.9 COUGH: ICD-10-CM

## 2021-01-04 PROCEDURE — 99213 OFFICE O/P EST LOW 20 MIN: CPT | Mod: 95,CR | Performed by: FAMILY MEDICINE

## 2021-01-04 RX ORDER — DEXAMETHASONE 4 MG/1
1 TABLET ORAL 2 TIMES DAILY
Qty: 1 EACH | Refills: 3 | Status: SHIPPED | OUTPATIENT
Start: 2021-01-04 | End: 2021-08-03

## 2021-01-04 NOTE — PROGRESS NOTES
Virtual Visit: Established Patient   This visit was conducted via [employer] using secure and encrypted videoconferencing technology. The patient was in a private location in the state of [place].    The patient's identity was confirmed and verbal consent was obtained for this virtual visit.    Subjective:   CC:   Chief Complaint   Patient presents with   • Follow-Up     rachel Moscoso is a 65 y.o. male presenting for evaluation and management of:    #covid 19 positive  #cough  This is a new condition.    Symptom onset: tested positive 12/29/20  Current symptoms: on/off cough initially improving but now returning. No current fever. Denies any SOB.   Since onset symptoms are: Unchanged  Modifying factors: not taking pulse ox at home  Treatments tried: social isolation and tessalon perles.   Associated symptoms: Denies any       ROS   Denies any recent fevers or chills. No nausea or vomiting. No chest pains or shortness of breath.     Allergies   Allergen Reactions   • Kiwi Extract      Tongue itching    • Penicillins Hives and Itching   • Sulfa Drugs      Dizzy,tongue swells       Current medicines (including changes today)  Current Outpatient Medications   Medication Sig Dispense Refill   • fluticasone (FLOVENT HFA) 110 MCG/ACT Aerosol Inhale 1 Puff 2 times a day. 1 Each 3   • benzonatate (TESSALON) 100 MG Cap Take 1-2 Caps by mouth 3 times a day as needed for Cough. 100 Cap 0   • amlodipine-benazepril (LOTREL) 5-20 MG per capsule Take 1 Cap by mouth every day.     • atorvastatin (LIPITOR) 10 MG Tab Take 10 mg by mouth every evening.     • omeprazole (PRILOSEC) 20 MG delayed-release capsule TAKE ONE CAPSULE BY MOUTH EVERY DAY 90 Cap 0   • aspirin EC (ECOTRIN) 81 MG Tablet Delayed Response Take 81 mg by mouth every day.     • finasteride (PROSCAR) 5 MG Tab Take 5 mg by mouth every bedtime.  1   • tamsulosin (FLOMAX) 0.4 MG capsule Take 0.4 mg by mouth 2 Times a Day.  4     No current  "facility-administered medications for this visit.        Patient Active Problem List    Diagnosis Date Noted   • BPH (benign prostatic hyperplasia) 12/15/2015     Priority: Medium   • PETER (obstructive sleep apnea) 06/17/2013     Priority: Medium   • Hypercholesteremia 06/17/2013     Priority: Medium   • History of CVA (cerebrovascular accident) without residual deficits 06/16/2013     Priority: Medium   • Vitamin D deficiency 12/15/2015     Priority: Low   • IGT A1c-=5.8 12/15/2015     Priority: Low   • History of positive PPD 09/17/2014     Priority: Low   • Tobacco dependence 06/17/2013     Priority: Low   • Cough 01/04/2021   • Essential hypertension 11/02/2020   • Gastroesophageal reflux disease without esophagitis 12/15/2015       Family History   Problem Relation Age of Onset   • Seizures Mother    • Cancer Sister         eldest sister-lung/chest       He  has a past medical history of Blood transfusion without reported diagnosis (11 yoa), Cataract, Dental disorder, Heart burn, High cholesterol, Hyperlipidemia, Kidney disease, Snoring, and Stroke (HCC) (6/2013). He also has no past medical history of Clotting disorder ([facility]), COPD (chronic obstructive pulmonary disease) ([facility]), or Diabetic neuropathy ([facility]).  He  has a past surgical history that includes other; other (1983); knee arthroscopy (8/28/2012); medial meniscectomy (8/28/2012); abdominal exploration; eye surgery; vitrectomy posterior (Right, 6/4/2018); and shoulder arthroplasty total (Left, 1/2/2019).       Objective:   Ht 1.778 m (5' 10\") Comment: pt reported  Wt 88.5 kg (195 lb) Comment: pt reported  BMI 27.98 kg/m²     Physical Exam:  Constitutional: Alert, no distress, well-groomed.  Skin: No rashes in visible areas.  Eye: Round. Conjunctiva clear, lids normal. No icterus.   ENMT: Lips pink without lesions, good dentition, moist mucous membranes. Phonation normal.  Neck: No masses, no thyromegaly. Moves freely without pain.  Respiratory: Unlabored " respiratory effort, no cough or audible wheeze  Psych: Alert and oriented x3, normal affect and mood.       Assessment and Plan:   The following treatment plan was discussed:     1. Lab test positive for detection of COVID-19 virus  2. Cough  This is a new, worsening condition.  The patient recently tested positive for COVID-19 on 12/29/2020.  He has had an on and off cough previously relieved with Tessalon Perles.  Lately he feels as though his cough has gotten worse.  He still has Tessalon Perles leftover.  Recommend that he takes these.  I will also add Flovent to aid with his pulmonary inflammation and cough.  I also recommend that he purchase a pulse oximeter to monitor his oxygen levels.  Strict emergency room precautions given if his oxygen is starts coming down less than 90%.  He may also try hot tea with honey.  Return precautions were given today.  He verbalized understanding.  - fluticasone (FLOVENT HFA) 110 MCG/ACT Aerosol; Inhale 1 Puff 2 times a day.  Dispense: 1 Each; Refill: 3      Follow-up: Return if symptoms worsen or fail to improve.

## 2021-01-12 ENCOUNTER — IMMUNIZATION (OUTPATIENT)
Dept: FAMILY PLANNING/WOMEN'S HEALTH CLINIC | Facility: IMMUNIZATION CENTER | Age: 66
End: 2021-01-12
Attending: FAMILY MEDICINE

## 2021-01-12 DIAGNOSIS — Z23 ENCOUNTER FOR VACCINATION: Primary | ICD-10-CM

## 2021-01-12 PROCEDURE — 91300 PFIZER SARS-COV-2 VACCINE: CPT

## 2021-01-12 PROCEDURE — 0002A PFIZER SARS-COV-2 VACCINE: CPT

## 2021-03-30 ENCOUNTER — HOSPITAL ENCOUNTER (OUTPATIENT)
Facility: MEDICAL CENTER | Age: 66
End: 2021-03-30
Attending: PHYSICIAN ASSISTANT
Payer: MEDICARE

## 2021-03-30 PROCEDURE — 87077 CULTURE AEROBIC IDENTIFY: CPT

## 2021-03-30 PROCEDURE — 87186 SC STD MICRODIL/AGAR DIL: CPT

## 2021-03-30 PROCEDURE — 87086 URINE CULTURE/COLONY COUNT: CPT

## 2021-04-01 LAB
AMBIGUOUS DTTM AMBI4: NORMAL
SIGNIFICANT IND 70042: NORMAL
SITE SITE: NORMAL
SOURCE SOURCE: NORMAL

## 2021-04-19 ENCOUNTER — HOSPITAL ENCOUNTER (OUTPATIENT)
Facility: MEDICAL CENTER | Age: 66
End: 2021-04-19
Attending: PHYSICIAN ASSISTANT
Payer: MEDICARE

## 2021-04-19 PROCEDURE — 87186 SC STD MICRODIL/AGAR DIL: CPT

## 2021-04-19 PROCEDURE — 87086 URINE CULTURE/COLONY COUNT: CPT

## 2021-04-19 PROCEDURE — 87077 CULTURE AEROBIC IDENTIFY: CPT

## 2021-04-23 ENCOUNTER — HOSPITAL ENCOUNTER (OUTPATIENT)
Dept: RADIOLOGY | Facility: MEDICAL CENTER | Age: 66
End: 2021-04-23
Attending: PHYSICIAN ASSISTANT
Payer: MEDICARE

## 2021-04-23 DIAGNOSIS — N39.0 URINARY TRACT INFECTION WITHOUT HEMATURIA, SITE UNSPECIFIED: ICD-10-CM

## 2021-04-23 PROCEDURE — 76775 US EXAM ABDO BACK WALL LIM: CPT

## 2021-04-27 ENCOUNTER — TELEPHONE (OUTPATIENT)
Dept: MEDICAL GROUP | Facility: PHYSICIAN GROUP | Age: 66
End: 2021-04-27

## 2021-04-27 NOTE — TELEPHONE ENCOUNTER
ESTABLISHED PATIENT PRE-VISIT PLANNING     Patient was NOT contacted to complete PVP.     Note: Patient will not be contacted if there is no indication to call.     1.  Reviewed notes from the last few office visits within the medical group: Yes    2.  If any orders were placed at last visit or intended to be done for this visit (i.e. 6 mos follow-up), do we have Results/Consult Notes?         •  Labs - Labs ordered, completed on 12/29/2020 and results are in chart.  Note: If patient appointment is for lab review and patient did not complete labs, check with provider if OK to reschedule patient until labs completed.       •  Imaging - Imaging was not ordered at last office visit.       •  Referrals - No referrals were ordered at last office visit.    3. Is this appointment scheduled as a Hospital Follow-Up? No    4.  Immunizations were updated in Epic using Reconcile Outside Information activity? Yes    5.  Patient is due for the following Health Maintenance Topics:   Health Maintenance Due   Topic Date Due   • IMM DTaP/Tdap/Td Vaccine (1 - Tdap) [birth year]06/1974   • IMM ZOSTER VACCINES (1 of 2) Never done   • IMM PNEUMOCOCCAL VACCINE: 65+ Years (1 of 1 - PPSV23) 07/06/2018       6.  AHA (Pulse8) form printed for Provider? No, patient does not have any open alerts

## 2021-05-03 ENCOUNTER — TELEPHONE (OUTPATIENT)
Dept: MEDICAL GROUP | Facility: PHYSICIAN GROUP | Age: 66
End: 2021-05-03

## 2021-05-03 NOTE — TELEPHONE ENCOUNTER
Phone Number Called: [phone] (home)       Call outcome: Spoke to patient regarding message below.    Message: spoke to patient in regards to, he will be leaving to [place] his sister is on life support and doctors do not give her more time to live, he is hoping he can get some kind of letter explaining his medications and also needs to get a rapid covid test as his flight will be 05/05/21.

## 2021-05-17 ENCOUNTER — PATIENT MESSAGE (OUTPATIENT)
Dept: HEALTH INFORMATION MANAGEMENT | Facility: OTHER | Age: 66
End: 2021-05-17

## 2021-06-11 ENCOUNTER — HOSPITAL ENCOUNTER (OUTPATIENT)
Facility: MEDICAL CENTER | Age: 66
End: 2021-06-11
Attending: UROLOGY
Payer: MEDICARE

## 2021-06-11 PROCEDURE — 87077 CULTURE AEROBIC IDENTIFY: CPT | Mod: 91

## 2021-06-11 PROCEDURE — 87086 URINE CULTURE/COLONY COUNT: CPT

## 2021-06-11 PROCEDURE — 87186 SC STD MICRODIL/AGAR DIL: CPT

## 2021-07-02 ENCOUNTER — OFFICE VISIT (OUTPATIENT)
Dept: MEDICAL GROUP | Facility: PHYSICIAN GROUP | Age: 66
End: 2021-07-02
Payer: MEDICARE

## 2021-07-02 VITALS
DIASTOLIC BLOOD PRESSURE: 70 MMHG | HEART RATE: 87 BPM | SYSTOLIC BLOOD PRESSURE: 120 MMHG | WEIGHT: 196.6 LBS | TEMPERATURE: 98.1 F | RESPIRATION RATE: 17 BRPM | BODY MASS INDEX: 28.15 KG/M2 | HEIGHT: 70 IN | OXYGEN SATURATION: 97 %

## 2021-07-02 DIAGNOSIS — R06.02 SOB (SHORTNESS OF BREATH): ICD-10-CM

## 2021-07-02 PROCEDURE — 99214 OFFICE O/P EST MOD 30 MIN: CPT | Performed by: FAMILY MEDICINE

## 2021-07-02 RX ORDER — NITROFURANTOIN 25; 75 MG/1; MG/1
100 CAPSULE ORAL DAILY
COMMUNITY
Start: 2021-06-14 | End: 2021-08-27

## 2021-07-02 ASSESSMENT — PATIENT HEALTH QUESTIONNAIRE - PHQ9: CLINICAL INTERPRETATION OF PHQ2 SCORE: 0

## 2021-07-02 NOTE — PROGRESS NOTES
Subjective:     Chief Complaint   Patient presents with   • Shortness of Breath     when walking, since covid in december    • Medication Management     biopropoleo       HPI:   Tristin presents today to discuss the following.    SOB (shortness of breath)  This is a new problem  There is is the patient had COVID-19 infection in December 2020 the patient has been feeling shortness of breath on exertion or at night  Has had some wheezing  He was prescribed flovent but has not used  Denies any orthopnea   Denies any LE edema        Past Medical History:   Diagnosis Date   • Blood transfusion without reported diagnosis 11 yoa    during surgery/  In [place]   • Cataract     s/p surgery christi   • Dental disorder     full upper denture    • Heart burn    • High cholesterol    • Hyperlipidemia    • Kidney disease     kidney stone at 10 yoa   • Snoring    • Stroke (HCC) 6/2013    see ER records; anant consult after. presented with left sided weakness. takes 81mg daily       Current Outpatient Medications Ordered in Epic   Medication Sig Dispense Refill   • nitrofurantoin (MACROBID) 100 MG Cap Take 100 mg by mouth every day.     • fluticasone-salmeterol (ADVAIR) 250-50 MCG/DOSE AEROSOL POWDER, BREATH ACTIVATED Inhale 1 Puff every 12 hours. 60 Each 3   • benzonatate (TESSALON) 100 MG Cap Take 1-2 Caps by mouth 3 times a day as needed for Cough. 100 Cap 0   • amlodipine-benazepril (LOTREL) 5-20 MG per capsule Take 1 Cap by mouth every day.     • atorvastatin (LIPITOR) 10 MG Tab Take 10 mg by mouth every evening.     • omeprazole (PRILOSEC) 20 MG delayed-release capsule TAKE ONE CAPSULE BY MOUTH EVERY DAY 90 Cap 0   • aspirin EC (ECOTRIN) 81 MG Tablet Delayed Response Take 81 mg by mouth every day.     • finasteride (PROSCAR) 5 MG Tab Take 5 mg by mouth every bedtime.  1   • tamsulosin (FLOMAX) 0.4 MG capsule Take 0.4 mg by mouth 2 Times a Day.  4   • fluticasone (FLOVENT HFA) 110 MCG/ACT Aerosol Inhale 1 Puff 2 times a day.  "(Patient not taking: Reported on 7/2/2021) 1 Each 3     No current Epic-ordered facility-administered medications on file.       Allergies:  Kiwi extract, Penicillins, and Sulfa drugs    Health Maintenance: Completed    ROS:  Gen: no fevers/chills, no changes in weight  Eyes: no changes in vision  Pulm: no sob, no cough  CV: no chest pain, no palpitations  GI: no nausea/vomiting, no diarrhea      Objective:     Exam:  /70 (BP Location: Left arm, Patient Position: Sitting, BP Cuff Size: Adult)   Pulse 87   Temp 36.7 °C (98.1 °F) (Temporal)   Resp 17   Ht 1.778 m (5' 10\")   Wt 89.2 kg (196 lb 9.6 oz)   SpO2 97%   BMI 28.21 kg/m²  Body mass index is 28.21 kg/m².    Gen: Alert and oriented, No apparent distress.  HENT: TMs are clear. Oropharynx is w/o erythema or exudates. Neck is supple without cervical lymphadenopathy. No JVD.   Eyes: PERRLA  Lungs: Normal effort, CTA bilaterally, no wheezes, rhonchi, or rales  CV: Regular rate and rhythm. No murmurs, rubs, or gallops.  Abdomen: Soft, nontender, nondistended. Normal bowel sounds. Liver and spleen are not palpable  Ext: No clubbing, cyanosis, edema.  Skin: no rash, lesions or ulcers  Neuro: Moves all extremities.  Psych: AAOx3  od.        Assessment & Plan:     65 y.o. male with the following -     1. SOB (shortness of breath)  This is a new problem.  Unknown etiology or prognosis.  The patient has been complaining of shortness of breath ever since he had COVID-19 in December.  I suspected this may be secondary to reactive airway disease.  He was prescribed Flovent but he has not taken it.  I have endorsed the need for a trial.  He is amenable to this.  Denies any orthopnea, lower extremity edema.  Lungs are clear to auscultation today.  Return precautions recommended.  We will reassess in 8 weeks.      Return in about 8 weeks (around 8/27/2021).    Please note that this dictation was created using voice recognition software. I have made every reasonable " attempt to correct obvious errors, but I expect that there are errors of grammar and possibly content that I did not discover before finalizing the note.

## 2021-07-02 NOTE — ASSESSMENT & PLAN NOTE
This is a new problem  There is is the patient had COVID-19 infection in December 2020 the patient has been feeling shortness of breath on exertion or at night  Has had some wheezing  He was prescribed flovent but has not used  Denies any orthopnea   Denies any LE edema

## 2021-07-07 ENCOUNTER — HOSPITAL ENCOUNTER (OUTPATIENT)
Facility: MEDICAL CENTER | Age: 66
End: 2021-07-07
Attending: UROLOGY
Payer: MEDICARE

## 2021-07-07 PROCEDURE — 87186 SC STD MICRODIL/AGAR DIL: CPT

## 2021-07-07 PROCEDURE — 87077 CULTURE AEROBIC IDENTIFY: CPT

## 2021-07-07 PROCEDURE — 87086 URINE CULTURE/COLONY COUNT: CPT

## 2021-07-08 RX ORDER — ATORVASTATIN CALCIUM 10 MG/1
TABLET, FILM COATED ORAL
Qty: 90 TABLET | Refills: 3 | Status: SHIPPED | OUTPATIENT
Start: 2021-07-08 | End: 2022-05-02

## 2021-08-03 ENCOUNTER — TELEPHONE (OUTPATIENT)
Dept: MEDICAL GROUP | Facility: PHYSICIAN GROUP | Age: 66
End: 2021-08-03

## 2021-08-03 DIAGNOSIS — K21.9 GASTROESOPHAGEAL REFLUX DISEASE WITHOUT ESOPHAGITIS: ICD-10-CM

## 2021-08-03 RX ORDER — AMLODIPINE BESYLATE AND BENAZEPRIL HYDROCHLORIDE 5; 20 MG/1; MG/1
1 CAPSULE ORAL DAILY
Qty: 90 CAPSULE | Refills: 3 | Status: SHIPPED | OUTPATIENT
Start: 2021-08-03 | End: 2022-01-18 | Stop reason: SDUPTHER

## 2021-08-03 RX ORDER — HYDROCHLOROTHIAZIDE 25 MG/1
TABLET ORAL
Qty: 90 TABLET | Refills: 0 | OUTPATIENT
Start: 2021-08-03

## 2021-08-03 RX ORDER — BENZONATATE 100 MG/1
100-200 CAPSULE ORAL 3 TIMES DAILY PRN
Qty: 100 CAPSULE | Refills: 0 | OUTPATIENT
Start: 2021-08-03

## 2021-08-03 RX ORDER — OMEPRAZOLE 20 MG/1
20 CAPSULE, DELAYED RELEASE ORAL
Qty: 90 CAPSULE | Refills: 3 | Status: SHIPPED | OUTPATIENT
Start: 2021-08-03 | End: 2022-07-28

## 2021-08-03 RX ORDER — OMEPRAZOLE 20 MG/1
CAPSULE, DELAYED RELEASE ORAL
Qty: 90 CAPSULE | Refills: 0 | OUTPATIENT
Start: 2021-08-03

## 2021-08-03 RX ORDER — BENZONATATE 100 MG/1
100-200 CAPSULE ORAL 3 TIMES DAILY PRN
Qty: 100 CAPSULE | Refills: 0 | Status: SHIPPED | OUTPATIENT
Start: 2021-08-03 | End: 2022-02-22

## 2021-08-03 NOTE — TELEPHONE ENCOUNTER
----- Message from Andrew Kumari M.D. sent at 8/3/2021  3:14 PM PDT -----  Can you please call patient and go over medications?  He is requesting a refill for hydrochlorothiazide and I will have that in the system.  I have him taking Lotrel instead.    Thank you,    Dr. GRUPO Kumari  Family Medicine Physician  [facility]  [phone]

## 2021-08-03 NOTE — TELEPHONE ENCOUNTER
Phone Number Called: [phone] (home) [phone] (work)      Call outcome: Spoke to patient regarding message below.    Message: patient informed me that he is taking lotrel

## 2021-08-13 ENCOUNTER — TELEMEDICINE (OUTPATIENT)
Dept: MEDICAL GROUP | Facility: PHYSICIAN GROUP | Age: 66
End: 2021-08-13
Payer: MEDICARE

## 2021-08-13 VITALS — BODY MASS INDEX: 28.06 KG/M2 | WEIGHT: 196 LBS | HEIGHT: 70 IN

## 2021-08-13 DIAGNOSIS — L60.0: ICD-10-CM

## 2021-08-13 PROCEDURE — 99213 OFFICE O/P EST LOW 20 MIN: CPT | Mod: 95,CR | Performed by: FAMILY MEDICINE

## 2021-08-13 RX ORDER — ALFUZOSIN HYDROCHLORIDE 10 MG/1
TABLET, EXTENDED RELEASE ORAL
COMMUNITY
End: 2023-10-04

## 2021-08-13 RX ORDER — CEFUROXIME AXETIL 500 MG/1
TABLET ORAL
COMMUNITY
End: 2021-08-27

## 2021-08-13 NOTE — PROGRESS NOTES
Virtual Visit: Established Patient   This visit was conducted via Zoom using secure and encrypted videoconferencing technology.   The patient was in a private location in the state of [place].    The patient's identity was confirmed and verbal consent was obtained for this virtual visit.    Subjective:   CC:   Chief Complaint   Patient presents with   • Referral Needed     podiatry        Tristin Moscoso is a 66 y.o. male presenting for evaluation and management of:    #ingrown toe nail  New problem the patient has had recurrent ingrown toe nail localized to the right 2nd toe. Nail is also changing color.   Denies any active infection or pus drainage  Requesting referral to podiatry    ROS       Current medicines (including changes today)  Current Outpatient Medications   Medication Sig Dispense Refill   • cefUROXime (CEFTIN) 500 MG Tab cefuroxime axetil 500 mg tablet     • alfuzosin (UROXATRAL) 10 MG SR tablet alfuzosin ER 10 mg tablet,extended release 24 hr   TAKE 1 TABLET EVERY DAY BY ORAL ROUTE AT BEDTIME FOR 30 DAYS.     • omeprazole (PRILOSEC) 20 MG delayed-release capsule Take 1 capsule by mouth every day. 90 capsule 3   • benzonatate (TESSALON) 100 MG Cap Take 1-2 Capsules by mouth 3 times a day as needed for Cough. 100 capsule 0   • amlodipine-benazepril (LOTREL) 5-20 MG per capsule Take 1 capsule by mouth every day. 90 capsule 3   • atorvastatin (LIPITOR) 10 MG Tab TAKE 1 TABLET BY MOUTH EVERY DAY 90 tablet 3   • nitrofurantoin (MACROBID) 100 MG Cap Take 100 mg by mouth every day.     • fluticasone-salmeterol (ADVAIR) 250-50 MCG/DOSE AEROSOL POWDER, BREATH ACTIVATED Inhale 1 Puff every 12 hours. 60 Each 3   • aspirin EC (ECOTRIN) 81 MG Tablet Delayed Response Take 81 mg by mouth every day.     • finasteride (PROSCAR) 5 MG Tab Take 5 mg by mouth every bedtime.  1     No current facility-administered medications for this visit.       Patient Active Problem List    Diagnosis Date Noted   • SOB  "(shortness of breath) 07/02/2021   • Cough 01/04/2021   • Essential hypertension 11/02/2020   • Vitamin D deficiency 12/15/2015   • BPH (benign prostatic hyperplasia) 12/15/2015   • IGT A1c-=5.8 12/15/2015   • Gastroesophageal reflux disease without esophagitis 12/15/2015   • History of positive PPD 09/17/2014   • PETER (obstructive sleep apnea) 06/17/2013   • Hypercholesteremia 06/17/2013   • Tobacco dependence 06/17/2013   • History of CVA (cerebrovascular accident) without residual deficits 06/16/2013        Objective:   Ht 1.778 m (5' 10\") Comment: pt reported  Wt 88.9 kg (196 lb) Comment: pt reported  BMI 28.12 kg/m²     Physical Exam:  Constitutional: Alert, no distress, well-groomed.  Skin: No rashes in visible areas.  Eye: Round. Conjunctiva clear, lids normal. No icterus.   ENMT: Lips pink without lesions, good dentition, moist mucous membranes. Phonation normal.  Neck: No masses, no thyromegaly. Moves freely without pain.  Respiratory: Unlabored respiratory effort, no cough or audible wheeze  Psych: Alert and oriented x3, normal affect and mood.     Assessment and Plan:   The following treatment plan was discussed:     1. Ingrown nail of second toe  This is a new problem  The patient has symptoms consistent or concerning for recurrent ingrown toenail over the past couple of weeks.  He has been times where he does notice some purulent drainage coming out of his toe.  He is concerned about this.  Denies any active infection at this time but would like to be evaluated by a podiatrist.  He is also noticing toenail discoloration.  - REFERRAL TO PODIATRY    Other orders  - cefUROXime (CEFTIN) 500 MG Tab; cefuroxime axetil 500 mg tablet  - alfuzosin (UROXATRAL) 10 MG SR tablet; alfuzosin ER 10 mg tablet,extended release 24 hr   TAKE 1 TABLET EVERY DAY BY ORAL ROUTE AT BEDTIME FOR 30 DAYS.      Follow-up: No follow-ups on file.           "

## 2021-08-27 ENCOUNTER — OFFICE VISIT (OUTPATIENT)
Dept: MEDICAL GROUP | Facility: PHYSICIAN GROUP | Age: 66
End: 2021-08-27
Payer: MEDICARE

## 2021-08-27 VITALS
BODY MASS INDEX: 28.15 KG/M2 | RESPIRATION RATE: 16 BRPM | DIASTOLIC BLOOD PRESSURE: 80 MMHG | HEIGHT: 70 IN | HEART RATE: 84 BPM | WEIGHT: 196.6 LBS | OXYGEN SATURATION: 98 % | TEMPERATURE: 98.2 F | SYSTOLIC BLOOD PRESSURE: 134 MMHG

## 2021-08-27 DIAGNOSIS — I10 ESSENTIAL HYPERTENSION: ICD-10-CM

## 2021-08-27 DIAGNOSIS — L60.0 INGROWN TOENAIL: ICD-10-CM

## 2021-08-27 DIAGNOSIS — R73.02 IGT (IMPAIRED GLUCOSE TOLERANCE): ICD-10-CM

## 2021-08-27 PROCEDURE — 99214 OFFICE O/P EST MOD 30 MIN: CPT | Performed by: FAMILY MEDICINE

## 2021-08-27 NOTE — PROGRESS NOTES
Subjective:     Chief Complaint   Patient presents with   • Follow-Up       HPI:   Tristin presents today to discuss the following.    IGT (impaired glucose tolerance)  Chronic issue  Last a1c was 10/20 at 5.8%  BG has been stable    Ingrown toenail  New issue  Following up with podiatry  Stable at this time  No more pus drainage       Essential hypertension  Stable. Monitoring BP at home. Currently taking lotrel as directed. Also taking baby aspirin. Denies lightheadedness, vision changes, headache, palpitations or leg swelling.        Past Medical History:   Diagnosis Date   • Blood transfusion without reported diagnosis 11 yoa    during surgery/  [place]   • Cataract     s/p surgery christi   • Dental disorder     full upper denture    • Heart burn    • High cholesterol    • Hyperlipidemia    • Kidney disease     kidney stone at 10 yoa   • Snoring    • Stroke (HCC) 6/2013    see ER records; anant consult after. presented with left sided weakness. takes 81mg daily       Current Outpatient Medications Ordered in Epic   Medication Sig Dispense Refill   • alfuzosin (UROXATRAL) 10 MG SR tablet alfuzosin ER 10 mg tablet,extended release 24 hr   TAKE 1 TABLET EVERY DAY BY ORAL ROUTE AT BEDTIME FOR 30 DAYS.     • omeprazole (PRILOSEC) 20 MG delayed-release capsule Take 1 capsule by mouth every day. 90 capsule 3   • benzonatate (TESSALON) 100 MG Cap Take 1-2 Capsules by mouth 3 times a day as needed for Cough. 100 capsule 0   • amlodipine-benazepril (LOTREL) 5-20 MG per capsule Take 1 capsule by mouth every day. 90 capsule 3   • atorvastatin (LIPITOR) 10 MG Tab TAKE 1 TABLET BY MOUTH EVERY DAY 90 tablet 3   • fluticasone-salmeterol (ADVAIR) 250-50 MCG/DOSE AEROSOL POWDER, BREATH ACTIVATED Inhale 1 Puff every 12 hours. 60 Each 3   • aspirin EC (ECOTRIN) 81 MG Tablet Delayed Response Take 81 mg by mouth every day.     • finasteride (PROSCAR) 5 MG Tab Take 5 mg by mouth every bedtime.  1     No current Epic-ordered  "facility-administered medications on file.       Allergies:  Kiwi extract, Penicillins, and Sulfa drugs    Health Maintenance: Completed    ROS:  Gen: no fevers/chills, no changes in weight  Eyes: no changes in vision  Pulm: no sob, no cough  CV: no chest pain, no palpitations  GI: no nausea/vomiting, no diarrhea      Objective:     Exam:  /80 (BP Location: Left arm, Patient Position: Sitting, BP Cuff Size: Adult)   Pulse 84   Temp 36.8 °C (98.2 °F) (Temporal)   Resp 16   Ht 1.778 m (5' 10\")   Wt 89.2 kg (196 lb 9.6 oz)   SpO2 98%   BMI 28.21 kg/m²  Body mass index is 28.21 kg/m².    Constitutional: Alert, no distress, well-groomed.  Skin: Warm, dry, good turgor, no rashes in visible areas.  Eye: Equal, round and reactive, conjunctiva clear, lids normal.  ENMT: Lips without lesions, good dentition, moist mucous membranes.  Neck: Trachea midline, no masses, no thyromegaly.  Respiratory: Unlabored respiratory effort, no cough.  MSK: Normal gait, moves all extremities.  Neuro: Grossly non-focal.   Psych: Alert and oriented x3, normal affect and mood.        Assessment & Plan:     66 y.o. male with the following -     1. IGT (impaired glucose tolerance)  This is a chronic condition.  Last checked A1c 5.8% October 2020.  Blood sugar has been stable at home.    2. Ingrown toenail  This is a new problem.  Following up with podiatry for this problem.  Denies any acute issues today.    3. Essential hypertension  Chronic, stable condition.  Well-controlled.  We will continue with current regimen.      Return in about 3 months (around 11/27/2021).    Please note that this dictation was created using voice recognition software. I have made every reasonable attempt to correct obvious errors, but I expect that there are errors of grammar and possibly content that I did not discover before finalizing the note.      "

## 2021-12-27 ENCOUNTER — TELEPHONE (OUTPATIENT)
Dept: HEALTH INFORMATION MANAGEMENT | Facility: OTHER | Age: 66
End: 2021-12-27

## 2022-01-11 PROBLEM — J43.8 OTHER EMPHYSEMA (HCC): Status: ACTIVE | Noted: 2022-01-11

## 2022-01-11 PROBLEM — G31.9 CEREBELLAR ATROPHY (HCC): Status: ACTIVE | Noted: 2022-01-11

## 2022-01-11 PROBLEM — E66.3 OVERWEIGHT WITH BODY MASS INDEX (BMI) OF 27 TO 27.9 IN ADULT: Status: ACTIVE | Noted: 2022-01-11

## 2022-01-18 RX ORDER — AMLODIPINE BESYLATE AND BENAZEPRIL HYDROCHLORIDE 5; 20 MG/1; MG/1
1 CAPSULE ORAL DAILY
Qty: 90 CAPSULE | Refills: 3 | Status: SHIPPED | OUTPATIENT
Start: 2022-01-18 | End: 2022-11-22

## 2022-02-01 ENCOUNTER — HOSPITAL ENCOUNTER (OUTPATIENT)
Facility: MEDICAL CENTER | Age: 67
End: 2022-02-01
Attending: PHYSICIAN ASSISTANT
Payer: MEDICARE

## 2022-02-01 PROCEDURE — 87077 CULTURE AEROBIC IDENTIFY: CPT

## 2022-02-01 PROCEDURE — 87186 SC STD MICRODIL/AGAR DIL: CPT

## 2022-02-01 PROCEDURE — 87086 URINE CULTURE/COLONY COUNT: CPT

## 2022-02-22 RX ORDER — BENZONATATE 100 MG/1
100-200 CAPSULE ORAL 3 TIMES DAILY PRN
Qty: 100 CAPSULE | Refills: 0 | Status: SHIPPED | OUTPATIENT
Start: 2022-02-22 | End: 2023-03-07 | Stop reason: SDUPTHER

## 2022-04-18 SDOH — ECONOMIC STABILITY: TRANSPORTATION INSECURITY
IN THE PAST 12 MONTHS, HAS THE LACK OF TRANSPORTATION KEPT YOU FROM MEDICAL APPOINTMENTS OR FROM GETTING MEDICATIONS?: PATIENT DECLINED

## 2022-04-18 SDOH — ECONOMIC STABILITY: TRANSPORTATION INSECURITY
IN THE PAST 12 MONTHS, HAS LACK OF RELIABLE TRANSPORTATION KEPT YOU FROM MEDICAL APPOINTMENTS, MEETINGS, WORK OR FROM GETTING THINGS NEEDED FOR DAILY LIVING?: PATIENT DECLINED

## 2022-04-18 SDOH — ECONOMIC STABILITY: INCOME INSECURITY: HOW HARD IS IT FOR YOU TO PAY FOR THE VERY BASICS LIKE FOOD, HOUSING, MEDICAL CARE, AND HEATING?: NOT HARD AT ALL

## 2022-04-18 SDOH — ECONOMIC STABILITY: FOOD INSECURITY: WITHIN THE PAST 12 MONTHS, YOU WORRIED THAT YOUR FOOD WOULD RUN OUT BEFORE YOU GOT MONEY TO BUY MORE.: NEVER TRUE

## 2022-04-18 SDOH — HEALTH STABILITY: MENTAL HEALTH

## 2022-04-18 SDOH — ECONOMIC STABILITY: HOUSING INSECURITY
IN THE LAST 12 MONTHS, WAS THERE A TIME WHEN YOU DID NOT HAVE A STEADY PLACE TO SLEEP OR SLEPT IN A SHELTER (INCLUDING NOW)?: PATIENT REFUSED

## 2022-04-18 SDOH — HEALTH STABILITY: PHYSICAL HEALTH: ON AVERAGE, HOW MANY MINUTES DO YOU ENGAGE IN EXERCISE AT THIS LEVEL?: 30 MIN

## 2022-04-18 SDOH — ECONOMIC STABILITY: TRANSPORTATION INSECURITY
IN THE PAST 12 MONTHS, HAS LACK OF TRANSPORTATION KEPT YOU FROM MEETINGS, WORK, OR FROM GETTING THINGS NEEDED FOR DAILY LIVING?: PATIENT DECLINED

## 2022-04-18 SDOH — HEALTH STABILITY: PHYSICAL HEALTH: ON AVERAGE, HOW MANY DAYS PER WEEK DO YOU ENGAGE IN MODERATE TO STRENUOUS EXERCISE (LIKE A BRISK WALK)?: 2 DAYS

## 2022-04-18 SDOH — ECONOMIC STABILITY: FOOD INSECURITY: WITHIN THE PAST 12 MONTHS, THE FOOD YOU BOUGHT JUST DIDN'T LAST AND YOU DIDN'T HAVE MONEY TO GET MORE.: NEVER TRUE

## 2022-04-18 SDOH — ECONOMIC STABILITY: HOUSING INSECURITY

## 2022-04-18 SDOH — ECONOMIC STABILITY: INCOME INSECURITY: IN THE LAST 12 MONTHS, WAS THERE A TIME WHEN YOU WERE NOT ABLE TO PAY THE MORTGAGE OR RENT ON TIME?: PATIENT REFUSED

## 2022-04-18 ASSESSMENT — SOCIAL DETERMINANTS OF HEALTH (SDOH)
HOW OFTEN DO YOU GET TOGETHER WITH FRIENDS OR RELATIVES?: TWICE A WEEK
HOW OFTEN DO YOU ATTENT MEETINGS OF THE CLUB OR ORGANIZATION YOU BELONG TO?: NEVER
DO YOU BELONG TO ANY CLUBS OR ORGANIZATIONS SUCH AS CHURCH GROUPS UNIONS, FRATERNAL OR ATHLETIC GROUPS, OR SCHOOL GROUPS?: NO
ARE YOU MARRIED, WIDOWED, DIVORCED, SEPARATED, NEVER MARRIED, OR LIVING WITH A PARTNER?: LIVING WITH PARTNER
HOW OFTEN DO YOU ATTENT MEETINGS OF THE CLUB OR ORGANIZATION YOU BELONG TO?: NEVER
IN A TYPICAL WEEK, HOW MANY TIMES DO YOU TALK ON THE PHONE WITH FAMILY, FRIENDS, OR NEIGHBORS?: THREE TIMES A WEEK
DO YOU BELONG TO ANY CLUBS OR ORGANIZATIONS SUCH AS CHURCH GROUPS UNIONS, FRATERNAL OR ATHLETIC GROUPS, OR SCHOOL GROUPS?: NO
HOW HARD IS IT FOR YOU TO PAY FOR THE VERY BASICS LIKE FOOD, HOUSING, MEDICAL CARE, AND HEATING?: NOT HARD AT ALL
WITHIN THE PAST 12 MONTHS, YOU WORRIED THAT YOUR FOOD WOULD RUN OUT BEFORE YOU GOT THE MONEY TO BUY MORE: NEVER TRUE
HOW OFTEN DO YOU GET TOGETHER WITH FRIENDS OR RELATIVES?: TWICE A WEEK
HOW OFTEN DO YOU HAVE A DRINK CONTAINING ALCOHOL: MONTHLY OR LESS
HOW MANY DRINKS CONTAINING ALCOHOL DO YOU HAVE ON A TYPICAL DAY WHEN YOU ARE DRINKING: 1 OR 2
HOW OFTEN DO YOU HAVE SIX OR MORE DRINKS ON ONE OCCASION: NEVER
ARE YOU MARRIED, WIDOWED, DIVORCED, SEPARATED, NEVER MARRIED, OR LIVING WITH A PARTNER?: LIVING WITH PARTNER
IN A TYPICAL WEEK, HOW MANY TIMES DO YOU TALK ON THE PHONE WITH FAMILY, FRIENDS, OR NEIGHBORS?: THREE TIMES A WEEK

## 2022-04-18 ASSESSMENT — LIFESTYLE VARIABLES
HOW OFTEN DO YOU HAVE A DRINK CONTAINING ALCOHOL: MONTHLY OR LESS
HOW OFTEN DO YOU HAVE SIX OR MORE DRINKS ON ONE OCCASION: NEVER
HOW MANY STANDARD DRINKS CONTAINING ALCOHOL DO YOU HAVE ON A TYPICAL DAY: 1 OR 2

## 2022-04-19 ENCOUNTER — OFFICE VISIT (OUTPATIENT)
Dept: MEDICAL GROUP | Facility: PHYSICIAN GROUP | Age: 67
End: 2022-04-19
Payer: MEDICARE

## 2022-04-19 VITALS
WEIGHT: 198 LBS | HEIGHT: 71 IN | HEART RATE: 73 BPM | OXYGEN SATURATION: 93 % | BODY MASS INDEX: 27.72 KG/M2 | TEMPERATURE: 98.1 F | DIASTOLIC BLOOD PRESSURE: 68 MMHG | SYSTOLIC BLOOD PRESSURE: 128 MMHG

## 2022-04-19 DIAGNOSIS — Z00.00 WELL ADULT EXAM: ICD-10-CM

## 2022-04-19 DIAGNOSIS — R60.0 PEDAL EDEMA: ICD-10-CM

## 2022-04-19 DIAGNOSIS — R05.9 COUGH: ICD-10-CM

## 2022-04-19 PROCEDURE — 99214 OFFICE O/P EST MOD 30 MIN: CPT | Performed by: FAMILY MEDICINE

## 2022-04-19 NOTE — ASSESSMENT & PLAN NOTE
New issue  Over the past 3 mo has had pitting edema  Some SOB but did have covid in the past  Denies orthopnea   Denies any CP

## 2022-04-19 NOTE — PROGRESS NOTES
Subjective:     Chief Complaint   Patient presents with   • Edema     Both feet, [id number], comes and goes,   • Referral Needed     Podiatry    • Results     ears   • Cough     Tested neg for covid 3 weeks ago       HPI:   Tristin presents today to discuss the following.    Pedal edema  New issue  Over the past 3 mo has had pitting edema  Some SOB but did have covid in the past  Denies orthopnea   Denies any CP    Cough  Chronic issue  Taking Advair inconsistently   Not on albuterol       Past Medical History:   Diagnosis Date   • Blood transfusion without reported diagnosis 11 yoa    during surgery/  [place]   • Cataract     s/p surgery christi   • Dental disorder     full upper denture    • Heart burn    • High cholesterol    • Hyperlipidemia    • Kidney disease     kidney stone at 10 yoa   • Other emphysema (HCC) 1/11/2022   • Snoring    • Stroke (HCC) 6/2013    see ER records; anant consult after. presented with left sided weakness. takes 81mg daily       Current Outpatient Medications Ordered in Epic   Medication Sig Dispense Refill   • benzonatate (TESSALON) 100 MG Cap TAKE 1-2 CAPSULES BY MOUTH 3 TIMES A DAY AS NEEDED FOR COUGH. 100 Capsule 0   • amlodipine-benazepril (LOTREL) 5-20 MG per capsule Take 1 Capsule by mouth every day. 90 Capsule 3   • alfuzosin (UROXATRAL) 10 MG SR tablet alfuzosin ER 10 mg tablet,extended release 24 hr   TAKE 1 TABLET EVERY DAY BY ORAL ROUTE AT BEDTIME FOR 30 DAYS.     • omeprazole (PRILOSEC) 20 MG delayed-release capsule Take 1 capsule by mouth every day. 90 capsule 3   • atorvastatin (LIPITOR) 10 MG Tab TAKE 1 TABLET BY MOUTH EVERY DAY 90 tablet 3   • fluticasone-salmeterol (ADVAIR) 250-50 MCG/DOSE AEROSOL POWDER, BREATH ACTIVATED Inhale 1 Puff every 12 hours. 60 Each 3   • aspirin EC (ECOTRIN) 81 MG Tablet Delayed Response Take 81 mg by mouth every day.     • finasteride (PROSCAR) 5 MG Tab Take 5 mg by mouth every bedtime.  1     No current [facility]-ordered facility-administered  "medications on file.       Allergies:  Kiwi extract, Penicillins, and Sulfa drugs    Health Maintenance: Completed    ROS:  Gen: no fevers/chills, no changes in weight  Eyes: no changes in vision  Pulm: no sob, no cough  CV: no chest pain, no palpitations  GI: no nausea/vomiting, no diarrhea      Objective:     Exam:  /68 (BP Location: Left arm, Patient Position: Sitting, BP Cuff Size: Adult)   Pulse 73   Temp 36.7 °C (98.1 °F) (Temporal)   Ht 1.803 m (5' 11\")   Wt 89.8 kg (198 lb)   SpO2 93%   BMI 27.62 kg/m²  Body mass index is 27.62 kg/m².    Gen: Alert and oriented, No apparent distress.  HENT: TMs are clear. Oropharynx is w/o erythema or exudates. Neck is supple without cervical lymphadenopathy. No JVD.   Eyes: PERRLA  Lungs: Normal effort, CTA bilaterally, no wheezes, rhonchi, or rales  CV: Regular rate and rhythm. No murmurs, rubs, or gallops.  Abdomen: Soft, nontender, nondistended. Normal bowel sounds. Liver and spleen are not palpable  Ext: No clubbing, cyanosis, but there is bilateral 1+ pitting edema.  Skin: no rash, lesions or ulcers  Neuro: Moves all extremities.  Psych: AAOx3          Assessment & Plan:     66 y.o. male with the following -     1. Pedal edema  New problem.  Unknown etiology and prognosis.  I would like to rule out secondary causes.  I will also order a TTE.  I recommend compression stocking use.  I will ensure stable renal function.  Return precautions recommended.  Close follow-up.  - Comp Metabolic Panel; Future  - TSH WITH REFLEX TO FT4; Future  - CBC WITH DIFFERENTIAL; Future  - EC-ECHOCARDIOGRAM COMPLETE W/O CONT; Future    2. Well adult exam  - Comp Metabolic Panel; Future  - TSH WITH REFLEX TO FT4; Future  - CBC WITH DIFFERENTIAL; Future    3. Cough  Chronic issue.  Likely secondary to reactive airway disease.  Continue with Advair use more consistently.        Return in about 6 weeks (around 5/31/2022).    Please note that this dictation was created using voice " recognition software. I have made every reasonable attempt to correct obvious errors, but I expect that there are errors of grammar and possibly content that I did not discover before finalizing the note.

## 2022-04-21 ENCOUNTER — HOSPITAL ENCOUNTER (OUTPATIENT)
Dept: CARDIOLOGY | Facility: MEDICAL CENTER | Age: 67
End: 2022-04-21
Attending: FAMILY MEDICINE
Payer: MEDICARE

## 2022-04-22 ENCOUNTER — HOSPITAL ENCOUNTER (OUTPATIENT)
Dept: LAB | Facility: MEDICAL CENTER | Age: 67
End: 2022-04-22
Attending: FAMILY MEDICINE
Payer: MEDICARE

## 2022-04-22 DIAGNOSIS — R60.0 PEDAL EDEMA: ICD-10-CM

## 2022-04-22 DIAGNOSIS — Z00.00 WELL ADULT EXAM: ICD-10-CM

## 2022-04-22 LAB
ALBUMIN SERPL BCP-MCNC: 4.6 G/DL (ref 3.2–4.9)
ALBUMIN/GLOB SERPL: 1.8 G/DL
ALP SERPL-CCNC: 108 U/L (ref 30–99)
ALT SERPL-CCNC: 33 U/L (ref 2–50)
ANION GAP SERPL CALC-SCNC: 11 MMOL/L (ref 7–16)
AST SERPL-CCNC: 22 U/L (ref 12–45)
BASOPHILS # BLD AUTO: 0.7 % (ref 0–1.8)
BASOPHILS # BLD: 0.06 K/UL (ref 0–0.12)
BILIRUB SERPL-MCNC: 0.4 MG/DL (ref 0.1–1.5)
BUN SERPL-MCNC: 18 MG/DL (ref 8–22)
CALCIUM SERPL-MCNC: 9.6 MG/DL (ref 8.5–10.5)
CHLORIDE SERPL-SCNC: 106 MMOL/L (ref 96–112)
CO2 SERPL-SCNC: 24 MMOL/L (ref 20–33)
CREAT SERPL-MCNC: 0.88 MG/DL (ref 0.5–1.4)
EOSINOPHIL # BLD AUTO: 0.28 K/UL (ref 0–0.51)
EOSINOPHIL NFR BLD: 3.3 % (ref 0–6.9)
ERYTHROCYTE [DISTWIDTH] IN BLOOD BY AUTOMATED COUNT: 41.1 FL (ref 35.9–50)
GFR SERPLBLD CREATININE-BSD FMLA CKD-EPI: 93 ML/MIN/1.73 M 2
GLOBULIN SER CALC-MCNC: 2.5 G/DL (ref 1.9–3.5)
GLUCOSE SERPL-MCNC: 125 MG/DL (ref 65–99)
HCT VFR BLD AUTO: 48.2 % (ref 42–52)
HGB BLD-MCNC: 16.4 G/DL (ref 14–18)
IMM GRANULOCYTES # BLD AUTO: 0.06 K/UL (ref 0–0.11)
IMM GRANULOCYTES NFR BLD AUTO: 0.7 % (ref 0–0.9)
LYMPHOCYTES # BLD AUTO: 2.05 K/UL (ref 1–4.8)
LYMPHOCYTES NFR BLD: 23.8 % (ref 22–41)
MCH RBC QN AUTO: 30.8 PG (ref 27–33)
MCHC RBC AUTO-ENTMCNC: 34 G/DL (ref 33.7–35.3)
MCV RBC AUTO: 90.4 FL (ref 81.4–97.8)
MONOCYTES # BLD AUTO: 0.87 K/UL (ref 0–0.85)
MONOCYTES NFR BLD AUTO: 10.1 % (ref 0–13.4)
NEUTROPHILS # BLD AUTO: 5.28 K/UL (ref 1.82–7.42)
NEUTROPHILS NFR BLD: 61.4 % (ref 44–72)
NRBC # BLD AUTO: 0 K/UL
NRBC BLD-RTO: 0 /100 WBC
PLATELET # BLD AUTO: 120 K/UL (ref 164–446)
PMV BLD AUTO: 11 FL (ref 9–12.9)
POTASSIUM SERPL-SCNC: 4.2 MMOL/L (ref 3.6–5.5)
PROT SERPL-MCNC: 7.1 G/DL (ref 6–8.2)
RBC # BLD AUTO: 5.33 M/UL (ref 4.7–6.1)
SODIUM SERPL-SCNC: 141 MMOL/L (ref 135–145)
TSH SERPL DL<=0.005 MIU/L-ACNC: 1.31 UIU/ML (ref 0.38–5.33)
WBC # BLD AUTO: 8.6 K/UL (ref 4.8–10.8)

## 2022-04-22 PROCEDURE — 36415 COLL VENOUS BLD VENIPUNCTURE: CPT

## 2022-04-22 PROCEDURE — 80053 COMPREHEN METABOLIC PANEL: CPT

## 2022-04-22 PROCEDURE — 85025 COMPLETE CBC W/AUTO DIFF WBC: CPT

## 2022-04-22 PROCEDURE — 84443 ASSAY THYROID STIM HORMONE: CPT

## 2022-05-31 ENCOUNTER — TELEPHONE (OUTPATIENT)
Dept: MEDICAL GROUP | Facility: PHYSICIAN GROUP | Age: 67
End: 2022-05-31
Payer: MEDICARE

## 2022-06-01 NOTE — TELEPHONE ENCOUNTER
VOICEMAIL  1. Caller Name: Tristin Moscoso                      Call Back Number: [phone]    2. Message: pt LVM requesting medication for Vertigo and dizziness.  Patient has upcoming appt 6/6/2022    3. Patient approves office to leave a detailed voicemail/MyChart message: N\A

## 2022-06-02 RX ORDER — MECLIZINE HCL 12.5 MG/1
12.5 TABLET ORAL 3 TIMES DAILY PRN
Qty: 30 TABLET | Refills: 0 | Status: SHIPPED | OUTPATIENT
Start: 2022-06-02 | End: 2023-10-06

## 2022-07-10 ENCOUNTER — APPOINTMENT (OUTPATIENT)
Dept: RADIOLOGY | Facility: MEDICAL CENTER | Age: 67
End: 2022-07-10
Attending: EMERGENCY MEDICINE
Payer: MEDICARE

## 2022-07-10 ENCOUNTER — APPOINTMENT (OUTPATIENT)
Dept: RADIOLOGY | Facility: MEDICAL CENTER | Age: 67
End: 2022-07-10
Attending: STUDENT IN AN ORGANIZED HEALTH CARE EDUCATION/TRAINING PROGRAM
Payer: MEDICARE

## 2022-07-10 ENCOUNTER — HOSPITAL ENCOUNTER (EMERGENCY)
Facility: MEDICAL CENTER | Age: 67
End: 2022-07-10
Attending: EMERGENCY MEDICINE
Payer: MEDICARE

## 2022-07-10 VITALS
TEMPERATURE: 97.6 F | DIASTOLIC BLOOD PRESSURE: 70 MMHG | WEIGHT: 201.72 LBS | HEART RATE: 48 BPM | SYSTOLIC BLOOD PRESSURE: 145 MMHG | OXYGEN SATURATION: 94 % | RESPIRATION RATE: 20 BRPM | BODY MASS INDEX: 28.88 KG/M2 | HEIGHT: 70 IN

## 2022-07-10 DIAGNOSIS — R20.0 RIGHT ARM NUMBNESS: ICD-10-CM

## 2022-07-10 LAB
ABO + RH BLD: NORMAL
ABO GROUP BLD: NORMAL
ALBUMIN SERPL BCP-MCNC: 4.2 G/DL (ref 3.2–4.9)
ALBUMIN/GLOB SERPL: 1.6 G/DL
ALP SERPL-CCNC: 87 U/L (ref 30–99)
ALT SERPL-CCNC: 25 U/L (ref 2–50)
ANION GAP SERPL CALC-SCNC: 11 MMOL/L (ref 7–16)
APTT PPP: 29.2 SEC (ref 24.7–36)
AST SERPL-CCNC: 22 U/L (ref 12–45)
BASOPHILS # BLD AUTO: 0.7 % (ref 0–1.8)
BASOPHILS # BLD: 0.05 K/UL (ref 0–0.12)
BILIRUB SERPL-MCNC: 0.3 MG/DL (ref 0.1–1.5)
BLD GP AB SCN SERPL QL: NORMAL
BUN SERPL-MCNC: 13 MG/DL (ref 8–22)
CALCIUM SERPL-MCNC: 8.9 MG/DL (ref 8.5–10.5)
CHLORIDE SERPL-SCNC: 109 MMOL/L (ref 96–112)
CO2 SERPL-SCNC: 21 MMOL/L (ref 20–33)
CREAT SERPL-MCNC: 1.01 MG/DL (ref 0.5–1.4)
EOSINOPHIL # BLD AUTO: 0.24 K/UL (ref 0–0.51)
EOSINOPHIL NFR BLD: 3.2 % (ref 0–6.9)
ERYTHROCYTE [DISTWIDTH] IN BLOOD BY AUTOMATED COUNT: 44.1 FL (ref 35.9–50)
GFR SERPLBLD CREATININE-BSD FMLA CKD-EPI: 81 ML/MIN/1.73 M 2
GLOBULIN SER CALC-MCNC: 2.7 G/DL (ref 1.9–3.5)
GLUCOSE BLD STRIP.AUTO-MCNC: 131 MG/DL (ref 65–99)
GLUCOSE SERPL-MCNC: 127 MG/DL (ref 65–99)
HCT VFR BLD AUTO: 44.3 % (ref 42–52)
HGB BLD-MCNC: 15.2 G/DL (ref 14–18)
IMM GRANULOCYTES # BLD AUTO: 0.03 K/UL (ref 0–0.11)
IMM GRANULOCYTES NFR BLD AUTO: 0.4 % (ref 0–0.9)
INR PPP: 1.02 (ref 0.87–1.13)
LYMPHOCYTES # BLD AUTO: 2.03 K/UL (ref 1–4.8)
LYMPHOCYTES NFR BLD: 27.1 % (ref 22–41)
MCH RBC QN AUTO: 31 PG (ref 27–33)
MCHC RBC AUTO-ENTMCNC: 34.3 G/DL (ref 33.7–35.3)
MCV RBC AUTO: 90.2 FL (ref 81.4–97.8)
MONOCYTES # BLD AUTO: 0.78 K/UL (ref 0–0.85)
MONOCYTES NFR BLD AUTO: 10.4 % (ref 0–13.4)
NEUTROPHILS # BLD AUTO: 4.36 K/UL (ref 1.82–7.42)
NEUTROPHILS NFR BLD: 58.2 % (ref 44–72)
NRBC # BLD AUTO: 0 K/UL
NRBC BLD-RTO: 0 /100 WBC
PLATELET # BLD AUTO: 184 K/UL (ref 164–446)
PMV BLD AUTO: 9.6 FL (ref 9–12.9)
POTASSIUM SERPL-SCNC: 3.9 MMOL/L (ref 3.6–5.5)
PROT SERPL-MCNC: 6.9 G/DL (ref 6–8.2)
PROTHROMBIN TIME: 13.3 SEC (ref 12–14.6)
RBC # BLD AUTO: 4.91 M/UL (ref 4.7–6.1)
RH BLD: NORMAL
SODIUM SERPL-SCNC: 141 MMOL/L (ref 135–145)
TROPONIN T SERPL-MCNC: 10 NG/L (ref 6–19)
WBC # BLD AUTO: 7.5 K/UL (ref 4.8–10.8)

## 2022-07-10 PROCEDURE — 86850 RBC ANTIBODY SCREEN: CPT

## 2022-07-10 PROCEDURE — 85730 THROMBOPLASTIN TIME PARTIAL: CPT

## 2022-07-10 PROCEDURE — 85610 PROTHROMBIN TIME: CPT

## 2022-07-10 PROCEDURE — 70498 CT ANGIOGRAPHY NECK: CPT | Mod: MG

## 2022-07-10 PROCEDURE — 85025 COMPLETE CBC W/AUTO DIFF WBC: CPT

## 2022-07-10 PROCEDURE — 36415 COLL VENOUS BLD VENIPUNCTURE: CPT

## 2022-07-10 PROCEDURE — 70450 CT HEAD/BRAIN W/O DYE: CPT | Mod: MG

## 2022-07-10 PROCEDURE — 70496 CT ANGIOGRAPHY HEAD: CPT | Mod: MG

## 2022-07-10 PROCEDURE — 86900 BLOOD TYPING SEROLOGIC ABO: CPT

## 2022-07-10 PROCEDURE — 99285 EMERGENCY DEPT VISIT HI MDM: CPT

## 2022-07-10 PROCEDURE — 82962 GLUCOSE BLOOD TEST: CPT

## 2022-07-10 PROCEDURE — 86901 BLOOD TYPING SEROLOGIC RH(D): CPT

## 2022-07-10 PROCEDURE — 70551 MRI BRAIN STEM W/O DYE: CPT | Mod: ME

## 2022-07-10 PROCEDURE — 0042T CT-CEREBRAL PERFUSION ANALYSIS: CPT

## 2022-07-10 PROCEDURE — 71045 X-RAY EXAM CHEST 1 VIEW: CPT

## 2022-07-10 PROCEDURE — 94760 N-INVAS EAR/PLS OXIMETRY 1: CPT

## 2022-07-10 PROCEDURE — 84484 ASSAY OF TROPONIN QUANT: CPT

## 2022-07-10 PROCEDURE — 700117 HCHG RX CONTRAST REV CODE 255: Performed by: EMERGENCY MEDICINE

## 2022-07-10 PROCEDURE — 80053 COMPREHEN METABOLIC PANEL: CPT

## 2022-07-10 RX ADMIN — IOHEXOL 171 ML: 350 INJECTION, SOLUTION INTRAVENOUS at 18:53

## 2022-07-10 ASSESSMENT — FIBROSIS 4 INDEX: FIB4 SCORE: 2.2

## 2022-07-11 NOTE — ED TRIAGE NOTES
"Chief Complaint   Patient presents with   • Numbness     Pt c/o R arm intermittent numbness to R arm onset 2 hours ago. NIH 1. Pt endorses history of stroke approx 5 years ago and takes daily ASA.       Ambulatory with spouse to triage for above complaint.   Educated on triage process, encourage to inform staff of any changes.     BP (!) 162/69   Pulse (!) 57   Temp 36.7 °C (98.1 °F) (Temporal)   Resp 16   Ht 1.778 m (5' 10\")   Wt 91.5 kg (201 lb 11.5 oz)   SpO2 96%   BMI 28.94 kg/m²     "

## 2022-07-11 NOTE — ED NOTES
Pt discharged, all appropriate hospital equipment removed (IV, monitor, pulse ox, etc.). Pt left unit via walking with partner to vehicle for home. Personal belongings with pt when leaving unit. Pt given discharge instructions prior to leaving unit including where to  prescriptions and when to follow-up; verbalizes understanding. Pt informed to return to ED if symptoms worsen/return or altered status develop. Copy of discharge instructions signed and turned into DC basket and copy sent with pt. F/u with PCP

## 2022-07-11 NOTE — ED PROVIDER NOTES
ED Provider Note    CHIEF COMPLAINT  Chief Complaint   Patient presents with   • Numbness     Pt c/o R arm intermittent numbness to R arm onset 2 hours ago. NIH 1. Pt endorses history of stroke approx 5 years ago and takes daily ASA.       HPI  Tristin oMscoso is a 67 y.o. male who presents to the emergency department as a code stroke.  Patient states about 2 hours ago he started having right arm numbness mostly to the back of the arm he states it does come and go to the forearm.  Maybe a little bit of numbness to the anterior thigh but no weakness no tingling no facial droop no vision changes no speech difficulty no headache no neck pain.  The patient states about 8 years ago he is not sure exactly when he did have a minor stroke which did have associated right-sided numbness throughout the whole side of the right body including the face arm and full leg without any weakness at that time.  He states otherwise he has been feeling well he has maybe had a little bit of off-and-on numbness over the last several days but about 2 hours ago it started again and has been the worst its been which is why he presented to make sure everything is okay.    REVIEW OF SYSTEMS  Positives as above. Pertinent negatives include nausea vomiting fevers chills speech difficulty headache neck pain vision changes weakness tingling difficulty ambulating  All other review of systems are negative    PAST MEDICAL HISTORY   has a past medical history of Blood transfusion without reported diagnosis (11 yoa), Cataract, Dental disorder, Heart burn, High cholesterol, Hyperlipidemia, Kidney disease, Other emphysema (HCC) (1/11/2022), Snoring, and Stroke (HCC) (6/2013).    SOCIAL HISTORY  Social History     Tobacco Use   • Smoking status: Current Every Day Smoker     Packs/day: 0.25     Years: 30.00     Pack years: 7.50     Types: Cigarettes, Cigars   • Smokeless tobacco: Never Used   • Tobacco comment: 1-3 cig/day.  Patient states in the past  "he smoked a pack a day.   Vaping Use   • Vaping Use: Never used   Substance and Sexual Activity   • Alcohol use: No     Alcohol/week: 0.0 oz   • Drug use: No   • Sexual activity: Not Currently     Partners: Female       SURGICAL HISTORY   has a past surgical history that includes other; other (1983); knee arthroscopy (8/28/2012); meniscectomy, knee, medial (8/28/2012); abdominal exploration; eye surgery; vitrectomy posterior (Right, 6/4/2018); and shoulder arthroplasty total (Left, 1/2/2019).    CURRENT MEDICATIONS  Home Medications     Reviewed by Karen Mendosa R.N. (Registered Nurse) on 07/10/22 at 1759  Med List Status: Not Addressed   Medication Last Dose Status   alfuzosin (UROXATRAL) 10 MG SR tablet  Active   amlodipine-benazepril (LOTREL) 5-20 MG per capsule  Active   aspirin EC (ECOTRIN) 81 MG Tablet Delayed Response  Active   atorvastatin (LIPITOR) 10 MG Tab  Active   benzonatate (TESSALON) 100 MG Cap  Active   finasteride (PROSCAR) 5 MG Tab  Active   fluticasone-salmeterol (ADVAIR) 250-50 MCG/DOSE AEROSOL POWDER, BREATH ACTIVATED  Active   meclizine (ANTIVERT) 12.5 MG Tab  Active   omeprazole (PRILOSEC) 20 MG delayed-release capsule  Active                ALLERGIES  Allergies   Allergen Reactions   • Kiwi Extract      Tongue itching    • Penicillins Hives and Itching   • Sulfa Drugs      Dizzy,tongue swells       PHYSICAL EXAM  VITAL SIGNS: BP (!) 162/69   Pulse (!) 57   Temp 36.7 °C (98.1 °F) (Temporal)   Resp 16   Ht 1.778 m (5' 10\")   Wt 91.5 kg (201 lb 11.5 oz)   SpO2 96%   BMI 28.94 kg/m²    Pulse ox interpretation: I interpret this pulse ox as normal.  Constitutional: Alert in no apparent distress.  HENT: Normocephalic atraumatic, MMM  Eyes: PER, Conjunctiva normal, Non-icteric.   Neck: Normal range of motion, No tenderness, Supple, No stridor.   Cardiovascular: Regular rate and rhythm, no murmurs.   Thorax & Lungs: Normal breath sounds, No respiratory distress, No wheezing, No chest " tenderness.   Abdomen: Bowel sounds normal, Soft, No tenderness, No pulsatile masses. No peritoneal signs.  Skin: Warm, Dry, No erythema, No rash.   Back: No bony tenderness, No CVA tenderness.   Extremities/MSK: Intact equal distal pulses, No edema, No tenderness, No cyanosis, no major deformities noted  Neurologic: Alert and oriented x3, Symmetric smile, eyes shut tight bilaterally, forehead wrinkles bilaterally, sensation intact to light touch bilateral face, tongue midline, head turn and shoulder shrug with full strength. Hearing intact grossly bilaterally. 5/5  strength bilaterally, 5/5 tricep and bicep strength bilaterally.  Mild diminished tactile sensation of the right upper extremity over the tricep area and a little bit in the forearm really intact on exam on the lower extremities but he can definitely feel on the right side but just feels diminished. 5/5 strength quadricep, plantarflexion/dorsiflexion/extensor hallicus longus bilaterally. Sensation intact to light touch bilateral lower extremities in all nerve distributions.  Intact finger-to-nose, no pronator drift, neg rhomberh         DIFFERENTIAL DIAGNOSIS AND WORK UP PLAN    This is a 67 y.o. male who presents with about 2 hours of right arm numbness little bit of left thigh numbness on history on examination mild minimal diminished sensation in the right upper extremity with an NIH total of 1.  Symptoms been relapsing remitting for 4 days this is similar to his prior stroke and that is on the same side but the last time it was all encompassing of the right side of his body.  I spoke with Dr. Pro on the patient's arrival due to extremely low NIH relapsing remitting symptoms the patient is not a tenecteplase candidate at this time but CT scans will be performed.    DIAGNOSTIC STUDIES / PROCEDURES    EKG  Results for orders placed or performed in visit on 12/18/18   EKG   Result Value Ref Range    Report       [facility]    Test Date:   2018  Pt Name:    GERDA BELLAMY       Department: [facility]  MRN:        [id number]                      Room:  Gender:     Male                         Technician: KIM  :        [id number]                   Requested By:JUNG PANDYA  Order #:    [id number]                    Reading MD: Yared Page MD    Measurements  Intervals                                Axis  Rate:       46                           P:          -12  ME:         144                          QRS:        -26  QRSD:       98                           T:          23  QT:         456  QTc:        399    Interpretive Statements  SINUS BRADYCARDIA  BORDERLINE LEFT AXIS DEVIATION  Compared to ECG 2018 14:10:45  No significant changes    Electronically Signed On 2018 11:05:58 PST by Yared Page MD         LABS  Pertinent Lab Findings    Labs Reviewed   COMP METABOLIC PANEL - Abnormal; Notable for the following components:       Result Value    Glucose 127 (*)     All other components within normal limits    Narrative:     Indicate which anticoagulants the patient is on:->UNKNOWN   CBC WITH DIFFERENTIAL    Narrative:     Indicate which anticoagulants the patient is on:->UNKNOWN   PROTHROMBIN TIME    Narrative:     Indicate which anticoagulants the patient is on:->UNKNOWN   APTT    Narrative:     Indicate which anticoagulants the patient is on:->UNKNOWN   COD (ADULT)   TROPONIN    Narrative:     Indicate which anticoagulants the patient is on:->UNKNOWN   ESTIMATED GFR    Narrative:     Indicate which anticoagulants the patient is on:->UNKNOWN   ABO RH CONFIRM       RADIOLOGY  DX-CHEST-PORTABLE (1 VIEW)   Final Result         1. No acute cardiopulmonary abnormalities are identified.      CT-CTA NECK WITH & W/O-POST PROCESSING   Final Result      1. No evidence of flow-limiting stenosis in the cervical carotid or cervical vertebral arteries.      CT-CTA HEAD WITH & W/O-POST PROCESS   Final Result      No intracranial  "aneurysm, focal stenosis or abrupt large vessel cut off.      CT-CEREBRAL PERFUSION ANALYSIS   Final Result      1.  Cerebral blood flow less than 30% likely representing completed infarct = 0 mL.      2.  T Max more than 6 seconds likely representing combination of completed infarct and ischemia = 0 mL.      3.  Mismatched volume likely representing ischemic brain/penumbra = None      4.  Please note that the cerebral perfusion was performed on the limited brain tissue around the basal ganglia region. Infarct/ischemia outside the CT perfusion sections can be missed in this study.      CT-HEAD W/O   Final Result         1. No acute intracranial abnormality. No evidence of acute intracranial hemorrhage or mass lesion.                     MR-BRAIN-W/O    (Results Pending)     The radiologist's interpretation of all radiological studies have been reviewed by me.      COURSE & MEDICAL DECISION MAKING  Pertinent Labs & Imaging studies reviewed. (See chart for details)    6:52 PM  Spoke w DR Pro neurology who has reviewed the patient's images there is no evidence of clot recommends the patient has a normal MRI then he can be discharged home with outpatient follow-up.  I spoke with the family at this time they are very relieved that his CT scans are normal and feel comfortable with the plan for MRI    11:16 PM  I spoke with radiology the preliminary read shows no acute stroke the official read will be done in the morning.  I reassessed patient at bedside he is resting comfortably states the numbness is actually improved he is feeling a lot better.  Laboratory and Alysis pretty unremarkable but otherwise he is well-appearing he can follow-up with primary care and return to the ED for any new or worsening issues.  He understands feels comfortable going home.    BP (!) 145/70   Pulse (!) 48   Temp 36.4 °C (97.6 °F) (Temporal)   Resp 20   Ht 1.778 m (5' 10\")   Wt 91.5 kg (201 lb 11.5 oz)   SpO2 94%   BMI 28.94 kg/m² "       I verified that the patient was wearing a mask and I was wearing appropriate PPE every time I entered the room. The patient's mask was on the patient at all times during my encounter except for a brief view of the oropharynx.    The patient will return for new or worsening symptoms and is stable at the time of discharge.    The patient is referred to a primary physician for blood pressure management, diabetic screening, and for all other preventative health concerns.    DISPOSITION:  Patient will be discharged home in stable condition.    FOLLOW UP:  Andrew Kumari M.D.  [address]  [facility] [address]  [facility] [address]  [phone]    Schedule an appointment as soon as possible for a visit       [facility], Emergency Dept  [address]  [place] [address]  [phone]    If symptoms worsen      OUTPATIENT MEDICATIONS:  Discharge Medication List as of 7/10/2022 11:30 PM              FINAL IMPRESSION  1. Right arm numbness             Electronically signed by: Susanna Mues M.D., 7/10/2022 6:12 PM    This dictation has been created using voice recognition software and/or scribes. The accuracy of the dictation is limited by the abilities of the software and the expertise of the scribes. I expect there may be some errors of grammar and possibly content. I made every attempt to manually correct the errors within my dictation. However, errors related to voice recognition software and/or scribes may still exist and should be interpreted within the appropriate context.

## 2022-07-11 NOTE — PROGRESS NOTES
67 year old man with a history of R thalamocapsular infarct presents with acute onset numbness that began 2 hours ago. Numbness has been intermittent affecting RUE and R thigh.    CTH shows no acute pathology.  CTP without perfusion deficit. CTA shows no large vessel stenosis or occlusion.    Recommend STAT MRI Brain without contrast to definitively rule out stroke.  If positive, neurology will leave further recs.

## 2022-07-11 NOTE — ED NOTES
1815 - FSBS 131.  Pt to CT w/ RN.    1830 - Pt's IV infiltrated in CT.  New IV established, hot pack placed to ANNE.      1840 - Pt to B19, report to Radha BASHIR. Pt denies numbness at this time, reports it is intermittent.

## 2022-07-12 ENCOUNTER — OFFICE VISIT (OUTPATIENT)
Dept: MEDICAL GROUP | Facility: PHYSICIAN GROUP | Age: 67
End: 2022-07-12
Payer: MEDICARE

## 2022-07-12 VITALS
HEART RATE: 62 BPM | DIASTOLIC BLOOD PRESSURE: 60 MMHG | TEMPERATURE: 98.5 F | WEIGHT: 199.3 LBS | BODY MASS INDEX: 27.9 KG/M2 | HEIGHT: 71 IN | SYSTOLIC BLOOD PRESSURE: 126 MMHG | OXYGEN SATURATION: 96 %

## 2022-07-12 DIAGNOSIS — R20.0 RIGHT ARM NUMBNESS: ICD-10-CM

## 2022-07-12 DIAGNOSIS — Z86.73 HISTORY OF CVA (CEREBROVASCULAR ACCIDENT) WITHOUT RESIDUAL DEFICITS: ICD-10-CM

## 2022-07-12 PROCEDURE — 99214 OFFICE O/P EST MOD 30 MIN: CPT | Performed by: FAMILY MEDICINE

## 2022-07-12 ASSESSMENT — FIBROSIS 4 INDEX: FIB4 SCORE: 1.65

## 2022-07-13 NOTE — PROGRESS NOTES
"Subjective     Benedicto Moscoso is a 67 y.o. male who presents with Possible Stroke (symptoms) and Vertigo            HPI     Patient is here for follow-up after recent ER Visit at [facility] on 7/10/2022 for right arm numbness.  He was having intermittent right arm numbness which started few days before going to the ER.  He felt this was the same symptom that he had when he had a stroke in 2013.    ER physician consulted the on-call neurologist who recommended outpatient follow-up.    At the ER, CTA of the head showed no acute problems.  He also had CTA of the head and the neck which showed no acute abnormal findings.  CT cerebral perfusion showed findings consistent with completed infarct.  MRI was done of the brain which showed chronic right posterior basal ganglia and corona radiata lacunar infarcts and no acute abnormality.    Patient had stroke in 2013 affecting the left side of the body with numbness and weakness.  These all resolved without residual problems.    He has been on ASA 81 mg 1 tablet daily.  Patient is already on atorvastatin 10 mg 1 tablet daily for dyslipidemia.    He has hypertension which is under control on amlodipine/benazepril.    Today said the right upper extremity numbness is already resolved.    Past medical history, past surgical history, family history reviewed-no changes    Social history reviewed-no changes    Allergies reviewed-no changes    Medications reviewed-no changes      ROS     As per HPI, the rest are negative.           Objective     /60 (BP Location: Left arm, Patient Position: Sitting, BP Cuff Size: Adult)   Pulse 62   Temp 36.9 °C (98.5 °F) (Temporal)   Ht 1.803 m (5' 11\")   Wt 90.4 kg (199 lb 4.7 oz)   SpO2 96%   BMI 27.80 kg/m²      Physical Exam     Examined alert, awake, oriented, not in distress    Eyes-positive PERRLA, EOMs intact  Face-no facial droop, tongue midline, soft palate elevates equally  Neck-supple, no lymphadenopathy, no " thyromegaly  Lungs-clear to auscultation, no rales, no wheezes  Heart-regular rate and rhythm, no murmur  Extremities-no edema, clubbing, cyanosis  Neuro exam- 5/5 upper and lower extremities, DTRs 2+, sensation intact to light touch, negative Romberg, cranial nerves intact             I reviewed the ER records           Assessment & Plan        1. Right arm numbness  No evidence of acute problems on imaging studies done at the ER including CT of the head, CTA of the head and neck and MRI.  It did show the old lacunar infarcts on MRI.  The right arm numbness was concerning for TIA.  Advised the patient to increase the aspirin dose to 325 mg 1 tablet daily.  [facility]  Continue taking atorvastatin.  - Referral to Neurology    2. History of CVA (cerebrovascular accident) without residual deficits  Patient with history of CVA in 2013 with left arm numbness and weakness.  At that time the MRI showed acute right basal ganglia/thalamic/internal capsule infarct with no hemorrhage.  No residual deficit from the the previous CVA.  His most recent MRI done at the ER 2 days ago came back chronic right posterior basal ganglia and corona radiata lacunar infarcts.  Most likely the right arm numbness which made him present to the ER could be TIA.  I will have him change his aspirin dose to 325 mg 1 tablet daily.  Referral placed to our [facility] for further evaluation and treatment.  Continue taking atorvastatin.  - Referral to Neurology    He will keep his appointment with his PCP Dr. Kumari on 7/29/2022.

## 2022-07-20 ENCOUNTER — HOSPITAL ENCOUNTER (OUTPATIENT)
Dept: CARDIOLOGY | Facility: MEDICAL CENTER | Age: 67
End: 2022-07-20
Attending: FAMILY MEDICINE
Payer: MEDICARE

## 2022-07-20 DIAGNOSIS — R60.0 PEDAL EDEMA: ICD-10-CM

## 2022-07-20 LAB
LV EJECT FRACT  99904: 55
LV EJECT FRACT MOD 2C 99903: 67.87
LV EJECT FRACT MOD 4C 99902: 68.7
LV EJECT FRACT MOD BP 99901: 68.96

## 2022-07-20 PROCEDURE — 93306 TTE W/DOPPLER COMPLETE: CPT

## 2022-07-20 PROCEDURE — 93306 TTE W/DOPPLER COMPLETE: CPT | Mod: 26 | Performed by: INTERNAL MEDICINE

## 2022-07-28 DIAGNOSIS — K21.9 GASTROESOPHAGEAL REFLUX DISEASE WITHOUT ESOPHAGITIS: ICD-10-CM

## 2022-07-28 RX ORDER — OMEPRAZOLE 20 MG/1
20 CAPSULE, DELAYED RELEASE ORAL
Qty: 90 CAPSULE | Refills: 3 | Status: SHIPPED | OUTPATIENT
Start: 2022-07-28 | End: 2023-07-21

## 2022-07-29 ENCOUNTER — OFFICE VISIT (OUTPATIENT)
Dept: MEDICAL GROUP | Facility: PHYSICIAN GROUP | Age: 67
End: 2022-07-29
Payer: MEDICARE

## 2022-07-29 VITALS
WEIGHT: 198 LBS | TEMPERATURE: 98.2 F | HEART RATE: 64 BPM | HEIGHT: 71 IN | BODY MASS INDEX: 27.72 KG/M2 | SYSTOLIC BLOOD PRESSURE: 124 MMHG | DIASTOLIC BLOOD PRESSURE: 82 MMHG | OXYGEN SATURATION: 98 %

## 2022-07-29 DIAGNOSIS — F17.200 TOBACCO DEPENDENCE: ICD-10-CM

## 2022-07-29 DIAGNOSIS — G45.9 TIA (TRANSIENT ISCHEMIC ATTACK): ICD-10-CM

## 2022-07-29 PROCEDURE — 99214 OFFICE O/P EST MOD 30 MIN: CPT | Performed by: FAMILY MEDICINE

## 2022-07-29 RX ORDER — BUPROPION HYDROCHLORIDE 150 MG/1
150 TABLET, EXTENDED RELEASE ORAL 2 TIMES DAILY
Qty: 180 TABLET | Refills: 0 | Status: SHIPPED | OUTPATIENT
Start: 2022-07-29 | End: 2022-10-24

## 2022-07-29 ASSESSMENT — FIBROSIS 4 INDEX: FIB4 SCORE: 1.65

## 2022-07-29 NOTE — ASSESSMENT & PLAN NOTE
New problem  The patient had TIA symptoms with right arm numbness  Went to the ED for this 7/10  Saw Dr Catalan 7/12  Recommended neuro referral plus ASA 325mg   To see neuro 11/8  Also on statin    MRI brain in the ED only showed old infarcts   TTE normal  Denies any recurrent symptoms right now     He is smoking. 4 cigs per day

## 2022-07-29 NOTE — PROGRESS NOTES
Subjective:     Chief Complaint   Patient presents with   • Follow-Up     US results        HPI:   Tristin presents today to discuss the following.     TIA (transient ischemic attack)  New problem  The patient had TIA symptoms with right arm numbness  Went to the ED for this 7/10  Saw Dr Catalan 7/12  Recommended neuro referral plus ASA 325mg   To see neuro 11/8  Also on statin    MRI brain in the ED only showed old infarcts   TTE normal  Denies any recurrent symptoms right now     He is smoking. 4 cigs per day     Tobacco dependence  Chronic issue  Smoking but amenable to quitting       Past Medical History:   Diagnosis Date   • Blood transfusion without reported diagnosis 11 yoa    during surgery/  In [place]   • Cataract     s/p surgery christi   • Dental disorder     full upper denture    • Heart burn    • High cholesterol    • Hyperlipidemia    • Kidney disease     kidney stone at 10 yoa   • Other emphysema (HCC) 1/11/2022   • Snoring    • Stroke (HCC) 6/2013    see ER records; anant consult after. presented with left sided weakness. takes 81mg daily       Current Outpatient Medications Ordered in Epic   Medication Sig Dispense Refill   • buPROPion SR (WELLBUTRIN-SR) 150 MG TABLET SR 12 HR sustained-release tablet Take 1 Tablet by mouth 2 times a day. 180 Tablet 0   • omeprazole (PRILOSEC) 20 MG delayed-release capsule TAKE 1 CAPSULE BY MOUTH EVERY DAY 90 Capsule 3   • meclizine (ANTIVERT) 12.5 MG Tab Take 1 Tablet by mouth 3 times a day as needed for Dizziness. 30 Tablet 0   • atorvastatin (LIPITOR) 10 MG Tab TAKE 1 TABLET BY MOUTH EVERY  Tablet 3   • benzonatate (TESSALON) 100 MG Cap TAKE 1-2 CAPSULES BY MOUTH 3 TIMES A DAY AS NEEDED FOR COUGH. 100 Capsule 0   • amlodipine-benazepril (LOTREL) 5-20 MG per capsule Take 1 Capsule by mouth every day. 90 Capsule 3   • alfuzosin (UROXATRAL) 10 MG SR tablet alfuzosin ER 10 mg tablet,extended release 24 hr   TAKE 1 TABLET EVERY DAY BY ORAL ROUTE AT BEDTIME FOR 30  "DAYS.     • fluticasone-salmeterol (ADVAIR) 250-50 MCG/DOSE AEROSOL POWDER, BREATH ACTIVATED Inhale 1 Puff every 12 hours. 60 Each 3   • aspirin EC (ECOTRIN) 81 MG Tablet Delayed Response Take 81 mg by mouth every day.     • finasteride (PROSCAR) 5 MG Tab Take 5 mg by mouth every bedtime.  1     No current Epic-ordered facility-administered medications on file.       Allergies:  Kiwi extract, Penicillins, and Sulfa drugs    Health Maintenance: Completed    ROS:  Gen: no fevers/chills, no changes in weight  Eyes: no changes in vision  Pulm: no sob, no cough  CV: no chest pain, no palpitations  GI: no nausea/vomiting, no diarrhea      Objective:     Exam:  /82 (BP Location: Left arm, Patient Position: Sitting, BP Cuff Size: Adult)   Pulse 64   Temp 36.8 °C (98.2 °F) (Temporal)   Ht 1.803 m (5' 11\")   Wt 89.8 kg (198 lb)   SpO2 98%   BMI 27.62 kg/m²  Body mass index is 27.62 kg/m².    Constitutional: Alert, no distress, well-groomed.  Skin: Warm, dry, good turgor, no rashes in visible areas.  Eye: Equal, round and reactive, conjunctiva clear, lids normal.  ENMT: Lips without lesions, good dentition, moist mucous membranes.  Neck: Trachea midline, no masses, no thyromegaly.  Respiratory: Unlabored respiratory effort, no cough.  MSK: Normal gait, moves all extremities.  Neuro: Grossly non-focal.   Psych: Alert and oriented x3, normal affect and mood.        Assessment & Plan:     67 y.o. male with the following -     1. TIA (transient ischemic attack)  New problem.  Unknown etiology and prognosis.  The patient does have a history of stroke in the past.  As such, his presentation in the emergency room most likely secondary to TIA.  He has already been referred to neurology.  Upon risk stratification the patient does smoke.  See problem #2.  We will do a trial of Wellbutrin for tobacco cessation.  I also recommend increasing aspirin to 325 mg daily.  Continue with statin.  Return precautions recommended.  - " buPROPion SR (WELLBUTRIN-SR) 150 MG TABLET SR 12 HR sustained-release tablet; Take 1 Tablet by mouth 2 times a day.  Dispense: 180 Tablet; Refill: 0    2. Tobacco dependence  Chronic condition.  Amenable to a trial of Wellbutrin for smoking cessation.  - buPROPion SR (WELLBUTRIN-SR) 150 MG TABLET SR 12 HR sustained-release tablet; Take 1 Tablet by mouth 2 times a day.  Dispense: 180 Tablet; Refill: 0      Return in about 3 months (around 10/29/2022).    Please note that this dictation was created using voice recognition software. I have made every reasonable attempt to correct obvious errors, but I expect that there are errors of grammar and possibly content that I did not discover before finalizing the note.

## 2022-10-24 DIAGNOSIS — G45.9 TIA (TRANSIENT ISCHEMIC ATTACK): ICD-10-CM

## 2022-10-24 DIAGNOSIS — F17.200 TOBACCO DEPENDENCE: ICD-10-CM

## 2022-10-24 RX ORDER — BUPROPION HYDROCHLORIDE 150 MG/1
TABLET, EXTENDED RELEASE ORAL
Qty: 180 TABLET | Refills: 0 | Status: SHIPPED | OUTPATIENT
Start: 2022-10-24 | End: 2023-01-24

## 2022-10-27 ENCOUNTER — OFFICE VISIT (OUTPATIENT)
Dept: MEDICAL GROUP | Facility: PHYSICIAN GROUP | Age: 67
End: 2022-10-27
Payer: MEDICARE

## 2022-10-27 VITALS
HEART RATE: 60 BPM | TEMPERATURE: 97.2 F | SYSTOLIC BLOOD PRESSURE: 122 MMHG | OXYGEN SATURATION: 97 % | HEIGHT: 71 IN | BODY MASS INDEX: 27.58 KG/M2 | RESPIRATION RATE: 14 BRPM | DIASTOLIC BLOOD PRESSURE: 70 MMHG | WEIGHT: 197 LBS

## 2022-10-27 DIAGNOSIS — I10 ESSENTIAL HYPERTENSION: ICD-10-CM

## 2022-10-27 DIAGNOSIS — Z12.5 SCREENING FOR MALIGNANT NEOPLASM OF PROSTATE: ICD-10-CM

## 2022-10-27 DIAGNOSIS — G45.9 TIA (TRANSIENT ISCHEMIC ATTACK): ICD-10-CM

## 2022-10-27 DIAGNOSIS — F17.200 TOBACCO DEPENDENCE: ICD-10-CM

## 2022-10-27 PROBLEM — R05.9 COUGH: Status: RESOLVED | Noted: 2021-01-04 | Resolved: 2022-10-27

## 2022-10-27 PROCEDURE — 99214 OFFICE O/P EST MOD 30 MIN: CPT | Performed by: FAMILY MEDICINE

## 2022-10-27 ASSESSMENT — FIBROSIS 4 INDEX: FIB4 SCORE: 1.65

## 2022-10-27 NOTE — PROGRESS NOTES
Subjective:     Chief Complaint   Patient presents with    Follow-Up     3 month       HPI:   Tristin presents today to discuss the following.    TIA (transient ischemic attack)  Was diagnosed with TIA after having right arm numbness 7/22  To follow up with neurology  Denies recurrence   On ASA    Tobacco dependence  Chronic issue  On wellbutrin  Quit about 4 weeks ago   Still has cravings     Essential hypertension  Chronic issue  Stable on lotrel     Past Medical History:   Diagnosis Date    Blood transfusion without reported diagnosis 11 yoa    during surgery/  In [place]    Cataract     s/p surgery christi    Dental disorder     full upper denture     Heart burn     High cholesterol     Hyperlipidemia     Kidney disease     kidney stone at 10 yoa    Other emphysema (HCC) 1/11/2022    Snoring     Stroke (HCC) 6/2013    see ER records; anant consult after. presented with left sided weakness. takes 81mg daily       Current Outpatient Medications Ordered in Epic   Medication Sig Dispense Refill    buPROPion SR (WELLBUTRIN-SR) 150 MG TABLET SR 12 HR sustained-release tablet TAKE 1 TABLET BY MOUTH TWICE A  Tablet 0    omeprazole (PRILOSEC) 20 MG delayed-release capsule TAKE 1 CAPSULE BY MOUTH EVERY DAY 90 Capsule 3    meclizine (ANTIVERT) 12.5 MG Tab Take 1 Tablet by mouth 3 times a day as needed for Dizziness. 30 Tablet 0    atorvastatin (LIPITOR) 10 MG Tab TAKE 1 TABLET BY MOUTH EVERY  Tablet 3    benzonatate (TESSALON) 100 MG Cap TAKE 1-2 CAPSULES BY MOUTH 3 TIMES A DAY AS NEEDED FOR COUGH. 100 Capsule 0    amlodipine-benazepril (LOTREL) 5-20 MG per capsule Take 1 Capsule by mouth every day. 90 Capsule 3    alfuzosin (UROXATRAL) 10 MG SR tablet alfuzosin ER 10 mg tablet,extended release 24 hr   TAKE 1 TABLET EVERY DAY BY ORAL ROUTE AT BEDTIME FOR 30 DAYS.      fluticasone-salmeterol (ADVAIR) 250-50 MCG/DOSE AEROSOL POWDER, BREATH ACTIVATED Inhale 1 Puff every 12 hours. 60 Each 3    aspirin EC (ECOTRIN) 81  "MG Tablet Delayed Response Take 81 mg by mouth every day.      finasteride (PROSCAR) 5 MG Tab Take 5 mg by mouth every bedtime.  1     No current Psychiatric-ordered facility-administered medications on file.       Allergies:  Kiwi extract, Penicillins, and Sulfa drugs    Health Maintenance: Completed    ROS:  Gen: no fevers/chills, no changes in weight  Eyes: no changes in vision  Pulm: no sob, no cough  CV: no chest pain, no palpitations  GI: no nausea/vomiting, no diarrhea      Objective:     Exam:  /70 (BP Location: Right arm, Patient Position: Sitting, BP Cuff Size: Adult)   Pulse 60   Temp 36.2 °C (97.2 °F) (Temporal)   Resp 14   Ht 1.803 m (5' 11\")   Wt 89.4 kg (197 lb)   SpO2 97%   BMI 27.48 kg/m²  Body mass index is 27.48 kg/m².    Constitutional: Alert, no distress, well-groomed.  Skin: Warm, dry, good turgor, no rashes in visible areas.  Eye: Equal, round and reactive, conjunctiva clear, lids normal.  ENMT: Lips without lesions, good dentition, moist mucous membranes.  Neck: Trachea midline, no masses, no thyromegaly.  Respiratory: Unlabored respiratory effort, no cough.  MSK: Normal gait, moves all extremities.  Neuro: Grossly non-focal.   Psych: Alert and oriented x3, normal affect and mood.        Assessment & Plan:     67 y.o. male with the following -     1. TIA (transient ischemic attack)  Established problem.  Stable.  Denies recurrence.  I do recommend he follows up with neurology.  Smoking cessation has been reinforced.    2. Tobacco dependence  Chronic condition.  Has been abstinent for the past 4 weeks.  Continue with Wellbutrin.    3. Essential hypertension  Chronic condition.  Continue with prescribed regimen.    4. Screening for malignant neoplasm of prostate  - PROSTATE SPECIFIC AG SCREENING; Future      Return in about 6 months (around 4/27/2023).          Please note that this dictation was created using voice recognition software. I have made every reasonable attempt to correct " obvious errors, but I expect that there are errors of grammar and possibly content that I did not discover before finalizing the note.

## 2022-10-27 NOTE — ASSESSMENT & PLAN NOTE
Was diagnosed with TIA after having right arm numbness 7/22  To follow up with neurology  Denies recurrence   On ASA

## 2022-11-03 ENCOUNTER — TELEPHONE (OUTPATIENT)
Dept: NEUROLOGY | Facility: MEDICAL CENTER | Age: 67
End: 2022-11-03
Payer: MEDICARE

## 2022-11-03 NOTE — TELEPHONE ENCOUNTER
New Patient     [facility] Patient is checked in Patient Demographics? Yes    Is visit type and length correct?  Yes    Is referral attached to visit? Yes    Were records received from referring provider? Yes, referring provider is [facility] based so records are in [facility].     Patient was not contacted to have someone accompany them to visit?    Is this appointment scheduled as a Hospital Follow-Up?  Yes    Does the patient require any pre procedure or post procedure follow up? No    If any orders were placed at last visit or intended to be done for this visit do we have Results/Consult Notes? Yes  Labs - Labs were not ordered at last office visit.  Note: If patient appointment is for lab review and patient did not complete labs, check with provider if OK to reschedule patient until labs completed.  Imaging - Imaging was not ordered at last office visit.  Referrals - No referrals were ordered at last office visit.        10.  If patient appointment is for Botox - is order pended for provider? No

## 2022-11-03 NOTE — TELEPHONE ENCOUNTER
New Patient     [facility] Patient is checked in Patient Demographics? Yes    Is visit type and length correct?  Yes    Is referral attached to visit? Yes    Were records received from referring provider? Yes, referring physician is a [facility] provider so records are in [facility].     Patient was not contacted to have someone accompany them to visit?    Is this appointment scheduled as a Hospital Follow-Up?  No    Does the patient require any pre procedure or post procedure follow up? No    If any orders were placed at last visit or intended to be done for this visit do we have Results/Consult Notes? Yes  Labs - Labs were not ordered at last office visit.  Note: If patient appointment is for lab review and patient did not complete labs, check with provider if OK to reschedule patient until labs completed.  Imaging - Imaging was not ordered at last office visit.  Referrals - No referrals were ordered at last office visit.        10.  If patient appointment is for Botox - is order pended for provider? No

## 2022-11-08 ENCOUNTER — OFFICE VISIT (OUTPATIENT)
Dept: NEUROLOGY | Facility: MEDICAL CENTER | Age: 67
End: 2022-11-08
Attending: PSYCHIATRY & NEUROLOGY
Payer: MEDICARE

## 2022-11-08 VITALS
RESPIRATION RATE: 17 BRPM | TEMPERATURE: 98.3 F | BODY MASS INDEX: 28.1 KG/M2 | OXYGEN SATURATION: 99 % | WEIGHT: 201.5 LBS | HEART RATE: 77 BPM | SYSTOLIC BLOOD PRESSURE: 122 MMHG | DIASTOLIC BLOOD PRESSURE: 62 MMHG

## 2022-11-08 DIAGNOSIS — Z86.73 HISTORY OF CVA (CEREBROVASCULAR ACCIDENT) WITHOUT RESIDUAL DEFICITS: ICD-10-CM

## 2022-11-08 PROCEDURE — 99212 OFFICE O/P EST SF 10 MIN: CPT | Performed by: PSYCHIATRY & NEUROLOGY

## 2022-11-08 PROCEDURE — 99205 OFFICE O/P NEW HI 60 MIN: CPT | Performed by: PSYCHIATRY & NEUROLOGY

## 2022-11-08 ASSESSMENT — FIBROSIS 4 INDEX: FIB4 SCORE: 1.65

## 2022-11-08 NOTE — PROGRESS NOTES
"[facility] NEUROLOGY  GENERAL NEUROLOGY  NEW PATIENT VISIT    Referral source: Ana Catalan MD    CC: \"right arm numbness, history of CVA...\"    HISTORY OF ILLNESS:  Tristin Moscoso is a 67 y.o. man with a history most notable for HTN, HLD, PETER, TIA, tobacco dependence (former), stroke (2013), cerebellar atrophy, and BPH.  Today, he was unaccompanied, and he provided the following history:    2013:  Benedicto had a stroke.  His symptoms included numbness over the entire right side of the body.  He was placed on ASA and a statin.  The numbness over the right side improved after 2-3 days.    7/12/2022:  Benedicto began experiencing episodes of right upper extremity numbness and weakness.  He lost sensation to light touch but not to deep touch.  Each episode lasted 45-60 seconds.  There were 3-4 episodes.  Episodes typically occurred while in a seated position (usually at the computer).    10/2022:  The last episode occurred ~3 weeks ago.    Otherwise, Benedicto has not experienced any new/worsened neurologic symptoms.  He has not noticed headaches.    MEDICAL AND SURGICAL HISTORY:  Past Medical History:   Diagnosis Date    Blood transfusion without reported diagnosis 11 yoa    during surgery/  In [place]    Cataract     s/p surgery christi    Dental disorder     full upper denture     Heart burn     High cholesterol     Hyperlipidemia     Kidney disease     kidney stone at 10 yoa    Other emphysema (HCC) 1/11/2022    Snoring     Stroke (HCC) 6/2013    see ER records; anant consult after. presented with left sided weakness. takes 81mg daily     Past Surgical History:   Procedure Laterality Date    SHOULDER ARTHROPLASTY TOTAL Left 1/2/2019    Procedure: SHOULDER ARTHROPLASTY TOTAL - REVERSE W/LATISSIMUS TRANSFER;  Surgeon: Marco Antonio Morel M.D.;  Location: SURGERY SAME DAY [facility];  Service: Orthopedics    VITRECTOMY POSTERIOR Right 6/4/2018    Procedure: VITRECTOMY POSTERIOR-  ENDO LASER, AIR FLUID EXCHANGE, SF6 " INTRAOCULAR GAS;  Surgeon: Rolf Bee M.D.;  Location: SURGERY SAME DAY [facility];  Service: Ophthalmology    KNEE ARTHROSCOPY  2012    Performed by ERIK MUHAMMAD at SURGERY SAME DAY [facility]    MENISCECTOMY, KNEE, MEDIAL  2012    Performed by ERIK MUHAMMAD at SURGERY SAME DAY [facility]    OTHER  1983    kidney stones    ABDOMINAL EXPLORATION      EYE SURGERY      cataract bilateral-had lens    OTHER      lense implants 6-7 years     MEDICATIONS:  Current Outpatient Medications   Medication Sig    buPROPion SR (WELLBUTRIN-SR) 150 MG TABLET SR 12 HR sustained-release tablet TAKE 1 TABLET BY MOUTH TWICE A DAY    omeprazole (PRILOSEC) 20 MG delayed-release capsule TAKE 1 CAPSULE BY MOUTH EVERY DAY    meclizine (ANTIVERT) 12.5 MG Tab Take 1 Tablet by mouth 3 times a day as needed for Dizziness.    atorvastatin (LIPITOR) 10 MG Tab TAKE 1 TABLET BY MOUTH EVERY DAY    benzonatate (TESSALON) 100 MG Cap TAKE 1-2 CAPSULES BY MOUTH 3 TIMES A DAY AS NEEDED FOR COUGH.    amlodipine-benazepril (LOTREL) 5-20 MG per capsule Take 1 Capsule by mouth every day.    alfuzosin (UROXATRAL) 10 MG SR tablet alfuzosin ER 10 mg tablet,extended release 24 hr   TAKE 1 TABLET EVERY DAY BY ORAL ROUTE AT BEDTIME FOR 30 DAYS.    fluticasone-salmeterol (ADVAIR) 250-50 MCG/DOSE AEROSOL POWDER, BREATH ACTIVATED Inhale 1 Puff every 12 hours.    aspirin EC (ECOTRIN) 81 MG Tablet Delayed Response Take 1 Tablet by mouth every day.    finasteride (PROSCAR) 5 MG Tab Take 1 Tablet by mouth at bedtime.     SOCIAL HISTORY:  Social History     Tobacco Use    Smoking status: Former     Packs/day: 0.25     Years: 30.00     Pack years: 7.50     Types: Cigarettes, Cigars     Quit date: 2022     Years since quittin.2    Smokeless tobacco: Never   Substance Use Topics    Alcohol use: No     Alcohol/week: 0.0 oz     Social History     Social History Narrative    Works maintenance at [employer].  .     FAMILY  HISTORY:  Family History   Problem Relation Age of Onset    Seizures Mother     Cancer Sister         eldest sister-lung/chest     REVIEW OF SYSTEMS:  A ROS was completed.  Pertinent positives and negatives were included in the HPI, above.  All other systems were reviewed and are negative.    PHYSICAL EXAM:  General/Medical:  - NAD  - hair, skin, nails, and joints were normal    Neuro:  MENTAL STATUS: awake and alert; no deficits of speech or language; oriented to person, place, and time; affect was appropriate to situation    CRANIAL NERVES:    II: acuity: J2-1/J2-1, fields: intact to confrontation, pupils: 3/3 to 2/2 without a relative afferent pupillary defect, discs: sharp    III/IV/VI: versions: intact without nystagmus    V: facial sensation: symmetric to light touch    VII: facial expression: symmetric    VIII: hearing: intact to finger rub    IX/X: palate: elevates symmetrically    XI: shoulder shrug: symmetric    XII: tongue: midline    MOTOR:  - bulk: normal throughout  - tone: normal in the upper extremities  Upper Extremity Strength  (R/L)    5/5   Elbow flexion 5/5   Elbow extension 5/5   Shoulder abduction 5/5     Lower Extremity Strength  (R/L)   Hip flexion 5/5   Knee extension 5/5   Knee flexion 5/5   Ankle plantarflexion 5/5   Ankle dorsiflexion 5/5     - can walk on toes and heels  - pronator drift: absent  - abnormal movements: none    SENSATION:  - light touch: grossly intact over the upper- and lower extremities  - vibration (R/L, seconds): NT/NT at the great toes  - pinprick: NT  - proprioception: NT  - Romberg: absent    COORDINATION:  - finger to nose: normal, no ataxia on exam  - finger tapping: rapid and accurate, bilaterally    REFLEXES:  Reflex Right Left   BR 2+ 2+   Biceps 3+ 3+   Triceps 3+ 3+   Patellae 3+ w/ CA 2+   Achilles 2+ 2+   Toes NT NT     GAIT:  - narrow base and normal  - heel-raised/toe-raised gait: intact/intact  - tandem gait: intact    REVIEW OF IMAGING STUDIES: I  "reviewed the following studies:  MRI Brain:  Date: 7/12/2022  W/o and w/ contrast?: without  Indication: \"right-sided numbness, right arm numbness\"  Comparison: MRI 6/17/2013, CT 7/10/2022  Impression:  \"1) Chronic right posterior basal ganglia and corona radiata lacunar infarcts.  2) No acute intracranial abnormality.\"    REVIEW OF LABORATORY STUDIES:  7/10/2022:  - CBC w/ diff: WNL  - CMP: notable for glucose: 127    ASSESSMENT:  Tristin Moscoso is a 67 y.o. man with episodic right upper extremity numbness and a history otherwise notable for HTN, HLD, PETER, TIA, tobacco dependence (former), stroke (2013), cerebellar atrophy, and BPH.  These symptoms probably localize to the CNS given the circumferential distribution of numbness (peripheral lesions would tend to cause dermatomal or otherwise patchy areas of numbness).  Differential diagnosis includes TIA/stroke (though episodes are fairly characteristic and a bit too brief to be typical for TIA, repeat MRI brain did not show new areas of stroke), migraine (though no history of migraine, and current episodes are not associated with headache), and seizure (though the stroke was sub-cortical).  I considered EEG and EMG/NCS.  Since the episodes are fairly rare and benign, I have asked Benedicto to keep a careful log.  We will review the data in a few months.    PLAN:  RUE Weakness/Numbness:  - carefully log symptoms  - will consider EEG or even EMG/NCS if symptoms persist    History of Stroke:  - continue ASA  - primary team could consider a LDL goal of <70    Follow-Up:  - Return in about 3 months (around 2/8/2023).    Signed: Wilmer Jain M.D.    BILLING DOCUMENTATION:   I spent 70 minutes reviewing the medical record, interviewing and examining the patient, discussing my impression (see \"assessment\" above), and coordinating care.  "

## 2022-11-22 RX ORDER — AMLODIPINE BESYLATE AND BENAZEPRIL HYDROCHLORIDE 5; 20 MG/1; MG/1
1 CAPSULE ORAL DAILY
Qty: 100 CAPSULE | Refills: 3 | Status: SHIPPED | OUTPATIENT
Start: 2022-11-22 | End: 2023-07-06 | Stop reason: SDUPTHER

## 2022-11-30 ENCOUNTER — DOCUMENTATION (OUTPATIENT)
Dept: HEALTH INFORMATION MANAGEMENT | Facility: OTHER | Age: 67
End: 2022-11-30
Payer: MEDICARE

## 2022-12-13 ENCOUNTER — HOSPITAL ENCOUNTER (OUTPATIENT)
Dept: LAB | Facility: MEDICAL CENTER | Age: 67
End: 2022-12-13
Attending: FAMILY MEDICINE
Payer: MEDICARE

## 2022-12-13 DIAGNOSIS — Z12.5 SCREENING FOR MALIGNANT NEOPLASM OF PROSTATE: ICD-10-CM

## 2022-12-13 PROCEDURE — 36415 COLL VENOUS BLD VENIPUNCTURE: CPT

## 2022-12-13 PROCEDURE — 84153 ASSAY OF PSA TOTAL: CPT

## 2022-12-14 LAB — PSA SERPL-MCNC: 2.32 NG/ML (ref 0–4)

## 2022-12-21 ENCOUNTER — HOSPITAL ENCOUNTER (OUTPATIENT)
Facility: MEDICAL CENTER | Age: 67
End: 2022-12-21
Attending: NURSE PRACTITIONER
Payer: MEDICARE

## 2022-12-21 ENCOUNTER — OFFICE VISIT (OUTPATIENT)
Dept: URGENT CARE | Facility: PHYSICIAN GROUP | Age: 67
End: 2022-12-21
Payer: MEDICARE

## 2022-12-21 VITALS
TEMPERATURE: 97.4 F | DIASTOLIC BLOOD PRESSURE: 72 MMHG | WEIGHT: 195 LBS | BODY MASS INDEX: 27.92 KG/M2 | OXYGEN SATURATION: 96 % | HEART RATE: 78 BPM | HEIGHT: 70 IN | RESPIRATION RATE: 18 BRPM | SYSTOLIC BLOOD PRESSURE: 136 MMHG

## 2022-12-21 DIAGNOSIS — R05.1 ACUTE COUGH: ICD-10-CM

## 2022-12-21 DIAGNOSIS — J06.9 VIRAL UPPER RESPIRATORY TRACT INFECTION: ICD-10-CM

## 2022-12-21 PROCEDURE — 99214 OFFICE O/P EST MOD 30 MIN: CPT | Performed by: NURSE PRACTITIONER

## 2022-12-21 PROCEDURE — U0003 INFECTIOUS AGENT DETECTION BY NUCLEIC ACID (DNA OR RNA); SEVERE ACUTE RESPIRATORY SYNDROME CORONAVIRUS 2 (SARS-COV-2) (CORONAVIRUS DISEASE [COVID-19]), AMPLIFIED PROBE TECHNIQUE, MAKING USE OF HIGH THROUGHPUT TECHNOLOGIES AS DESCRIBED BY CMS-2020-01-R: HCPCS

## 2022-12-21 PROCEDURE — U0005 INFEC AGEN DETEC AMPLI PROBE: HCPCS

## 2022-12-21 ASSESSMENT — FIBROSIS 4 INDEX: FIB4 SCORE: 1.65

## 2022-12-21 NOTE — PROGRESS NOTES
Tristin Moscoso is a 67 y.o. male who presents for Pharyngitis (X 2 days, cough)      HPI  This is a new problem. Tristin Moscoso is a 67 y.o. patient who presents to urgent care with c/o: sore throat, phlegm in throat x 2 days.  His wife is now being tested for covid and she was exposed to someone who had covid infection.   Treatments tried: taking RX cough medication ( old RX) - helped with cough.   Denies fever,  ear pain, NVD.    No other aggravating or alleviating factors.       ROS See HPI    Allergies:       Allergies   Allergen Reactions    Kiwi Extract      Tongue itching     Penicillins Hives and Itching    Sulfa Drugs      Dizzy,tongue swells       PMSFS Hx:  Past Medical History:   Diagnosis Date    Blood transfusion without reported diagnosis 11 yoa    during surgery/  In [place]    Cataract     s/p surgery christi    Dental disorder     full upper denture     Heart burn     High cholesterol     Hyperlipidemia     Kidney disease     kidney stone at 10 yoa    Other emphysema (HCC) 1/11/2022    Snoring     Stroke (HCC) 6/2013    see ER records; anant consult after. presented with left sided weakness. takes 81mg daily     Past Surgical History:   Procedure Laterality Date    SHOULDER ARTHROPLASTY TOTAL Left 1/2/2019    Procedure: SHOULDER ARTHROPLASTY TOTAL - REVERSE W/LATISSIMUS TRANSFER;  Surgeon: Marco Antonio Morel M.D.;  Location: SURGERY SAME DAY [facility];  Service: Orthopedics    VITRECTOMY POSTERIOR Right 6/4/2018    Procedure: VITRECTOMY POSTERIOR-  ENDO LASER, AIR FLUID EXCHANGE, SF6 INTRAOCULAR GAS;  Surgeon: Rolf Bee M.D.;  Location: SURGERY SAME DAY [facility];  Service: Ophthalmology    KNEE ARTHROSCOPY  8/28/2012    Performed by ERIK MUHAMMAD at SURGERY SAME DAY [facility]    MENISCECTOMY, KNEE, MEDIAL  8/28/2012    Performed by ERIK MUHAMMAD at SURGERY SAME DAY [facility]    OTHER  1983    kidney stones    ABDOMINAL EXPLORATION      EYE SURGERY       cataract bilateral-had lens    OTHER      lense implants 6-7 years     Family History   Problem Relation Age of Onset    Seizures Mother     Cancer Sister         eldest sister-lung/chest     Social History     Tobacco Use    Smoking status: Former     Packs/day: 0.25     Years: 30.00     Pack years: 7.50     Types: Cigarettes, Cigars     Quit date: 2022     Years since quittin.3    Smokeless tobacco: Never   Substance Use Topics    Alcohol use: No     Alcohol/week: 0.0 oz       Problems:   Patient Active Problem List   Diagnosis    History of CVA (cerebrovascular accident) without residual deficits    PETER (obstructive sleep apnea)    Hypercholesteremia    Tobacco dependence    History of positive PPD    Vitamin D deficiency    BPH (benign prostatic hyperplasia)    IGT (impaired glucose tolerance)    Gastroesophageal reflux disease without esophagitis    Essential hypertension    SOB (shortness of breath)    Ingrown toenail    Cerebellar atrophy (HCC)    Other emphysema (HCC)    Overweight with body mass index (BMI) of 27 to 27.9 in adult    Pedal edema    TIA (transient ischemic attack)       Medications:   Current Outpatient Medications on File Prior to Visit   Medication Sig Dispense Refill    amlodipine-benazepril (LOTREL) 5-20 MG per capsule TAKE 1 CAPSULE BY MOUTH EVERY  Capsule 3    buPROPion SR (WELLBUTRIN-SR) 150 MG TABLET SR 12 HR sustained-release tablet TAKE 1 TABLET BY MOUTH TWICE A  Tablet 0    omeprazole (PRILOSEC) 20 MG delayed-release capsule TAKE 1 CAPSULE BY MOUTH EVERY DAY 90 Capsule 3    meclizine (ANTIVERT) 12.5 MG Tab Take 1 Tablet by mouth 3 times a day as needed for Dizziness. 30 Tablet 0    atorvastatin (LIPITOR) 10 MG Tab TAKE 1 TABLET BY MOUTH EVERY  Tablet 3    benzonatate (TESSALON) 100 MG Cap TAKE 1-2 CAPSULES BY MOUTH 3 TIMES A DAY AS NEEDED FOR COUGH. 100 Capsule 0    alfuzosin (UROXATRAL) 10 MG SR tablet alfuzosin ER 10 mg tablet,extended release 24 hr    "TAKE 1 TABLET EVERY DAY BY ORAL ROUTE AT BEDTIME FOR 30 DAYS.      fluticasone-salmeterol (ADVAIR) 250-50 MCG/DOSE AEROSOL POWDER, BREATH ACTIVATED Inhale 1 Puff every 12 hours. 60 Each 3    aspirin EC (ECOTRIN) 81 MG Tablet Delayed Response Take 1 Tablet by mouth every day.      finasteride (PROSCAR) 5 MG Tab Take 1 Tablet by mouth at bedtime.  1     No current facility-administered medications on file prior to visit.          Objective:     /72   Pulse 78   Temp 36.3 °C (97.4 °F) (Temporal)   Resp 18   Ht 1.778 m (5' 10\")   Wt 88.5 kg (195 lb)   SpO2 96%   BMI 27.98 kg/m²     Physical Exam  Vitals and nursing note reviewed.   Constitutional:       General: He is not in acute distress.     Appearance: Normal appearance. He is well-developed and well-groomed. He is not ill-appearing or toxic-appearing.   HENT:      Head: Normocephalic and atraumatic.      Right Ear: Hearing, tympanic membrane, ear canal and external ear normal.      Left Ear: Hearing, tympanic membrane, ear canal and external ear normal.      Nose: Rhinorrhea present. No congestion. Rhinorrhea is clear.      Mouth/Throat:      Lips: Pink.      Mouth: Mucous membranes are moist.      Pharynx: No posterior oropharyngeal erythema.   Eyes:      General:         Right eye: No discharge.         Left eye: No discharge.      Conjunctiva/sclera: Conjunctivae normal.      Pupils: Pupils are equal, round, and reactive to light.   Cardiovascular:      Rate and Rhythm: Normal rate and regular rhythm.      Pulses: Normal pulses.      Heart sounds: Normal heart sounds.   Pulmonary:      Effort: Pulmonary effort is normal. No accessory muscle usage.      Breath sounds: Normal breath sounds and air entry.      Comments: Dry cough     Musculoskeletal:      Cervical back: Neck supple.   Lymphadenopathy:      Cervical: No cervical adenopathy.      Upper Body:      Right upper body: No supraclavicular adenopathy.      Left upper body: No supraclavicular " adenopathy.   Skin:     General: Skin is warm and dry.      Capillary Refill: Capillary refill takes less than 2 seconds.   Neurological:      Mental Status: He is alert and oriented to person, place, and time.   Psychiatric:         Mood and Affect: Mood normal.         Behavior: Behavior normal. Behavior is cooperative.         Thought Content: Thought content normal.         Assessment /Associated Orders:      1. Viral upper respiratory tract infection  SARS-CoV-2 PCR (24 hour In-House): Collect NP swab in VTM      2. Acute cough              Medical Decision Making:    Pt is clinically stable at today's acute urgent care visit.  No acute distress noted. Appropriate for outpatient care at this time.   Acute problem today .   Discussed that this illness appears to be viral in nature. I did not see any evidence of a bacterial process.   OTC medications can be used for symptom relief. Follow manufacturers guidelines for dosing and instructions.  These OTC medications will not make you better any faster but can help reduce your discomfort.   Resume all prior  RX medications. Take as prescribed.   Keep well hydrated  Covid PCR- collected today. Results pending   Quarantine per the CDC guidelines - pt educated on current recommendations and given CDC website for further information.       Discussed Dx, management options (risks,benefits, and alternatives to planned treatment), natural progression and supportive care.  Expressed understanding and the treatment plan was agreed upon.   Questions were encouraged and answered   Return to urgent care prn if new or worsening sx or if there is no improvement in condition prn.    Educated in Red flags and indications to immediately call 911 or present to the Emergency Department.       Time I spent evaluating Tristin Moscoso in urgent care today was 34  minutes. This time includes preparing for visit, reviewing any pertinent notes or test results, counseling/education,  exam, obtaining HPI, interpretation of lab tests, medication management and documentation as indicated above.Time does not include separately billable procedures noted .       Please note that this dictation was created using voice recognition software. I have worked with consultants from the vendor as well as technical experts from [facility] to optimize the interface. I have made every reasonable attempt to correct obvious errors, but I expect that there are errors of grammar and possibly content that I did not discover before finalizing the note.  This note was electronically signed by provider

## 2022-12-22 DIAGNOSIS — J06.9 VIRAL UPPER RESPIRATORY TRACT INFECTION: ICD-10-CM

## 2022-12-22 LAB
SARS-COV-2 RNA RESP QL NAA+PROBE: NOTDETECTED
SPECIMEN SOURCE: NORMAL

## 2022-12-23 ENCOUNTER — TELEPHONE (OUTPATIENT)
Dept: MEDICAL GROUP | Facility: PHYSICIAN GROUP | Age: 67
End: 2022-12-23
Payer: MEDICARE

## 2022-12-23 NOTE — TELEPHONE ENCOUNTER
Spoke to patient he states he just has a little cough, and will take over the counter medications

## 2022-12-23 NOTE — TELEPHONE ENCOUNTER
VOICEMAIL  1. Caller Name: Benedicto                      Call Back Number: [phone]     2. Message: Patient left  stating he tested positive for COVID at home test. Patient would like to know what to do.     3. Patient approves office to leave a detailed voicemail/MyChart message: N\A

## 2023-01-24 DIAGNOSIS — F17.200 TOBACCO DEPENDENCE: ICD-10-CM

## 2023-01-24 DIAGNOSIS — G45.9 TIA (TRANSIENT ISCHEMIC ATTACK): ICD-10-CM

## 2023-01-24 RX ORDER — BUPROPION HYDROCHLORIDE 150 MG/1
TABLET, EXTENDED RELEASE ORAL
Qty: 180 TABLET | Refills: 0 | Status: SHIPPED | OUTPATIENT
Start: 2023-01-24 | End: 2023-04-25

## 2023-01-24 NOTE — TELEPHONE ENCOUNTER
Received request via: Pharmacy    Was the patient seen in the last year in this department? Yes    Does the patient have an active prescription (recently filled or refills available) for medication(s) requested? No    Does the patient have custodial Plus and need 100 day supply (blood pressure, diabetes and cholesterol meds only)? Medication is not for cholesterol, blood pressure or diabetes

## 2023-02-03 ENCOUNTER — TELEPHONE (OUTPATIENT)
Dept: NEUROLOGY | Facility: MEDICAL CENTER | Age: 68
End: 2023-02-03
Payer: MEDICARE

## 2023-02-03 PROBLEM — Z87.891 FORMER SMOKER: Status: ACTIVE | Noted: 2023-02-03

## 2023-02-03 PROBLEM — I70.0 ATHEROSCLEROSIS OF AORTA (HCC): Status: ACTIVE | Noted: 2023-02-03

## 2023-02-03 PROBLEM — R25.1 TREMOR: Status: ACTIVE | Noted: 2023-02-03

## 2023-02-03 NOTE — TELEPHONE ENCOUNTER
NEUROLOGY PATIENT PRE-VISIT PLANNING     Patient was NOT contacted to complete PVP.  Note: Patient will not be contacted if there is no indication to call.     Patient Appointment is scheduled as: New Patient     Is visit type and length scheduled correctly? Yes    [employer] Patient is checked in Patient Demographics? Yes    3.   Is referral attached to visit? Yes    4. Were records received from referring provider? Yes    4. Patient was NOT contacted to have someone accompany them to visit.     5. Is this appointment scheduled as a Hospital Follow-Up?  Yes    6. Does the patient require any pre procedure or post procedure follow up? No    7. If any orders were placed at last visit or intended to be done for this visit do we have Results/Consult Notes? No  Labs - Labs ordered, completed on 000 and results are in chart.  Imaging - Imaging was not ordered at last office visit.  Referrals - No referrals were ordered at last office visit.  Note: If patient appointment is for lab or imaging review and patient did not complete the studies, check with provider if OK to reschedule patient until completed.    8. If patient appointment is for Botox - is order pended for provider? N/A

## 2023-02-08 ENCOUNTER — OFFICE VISIT (OUTPATIENT)
Dept: NEUROLOGY | Facility: MEDICAL CENTER | Age: 68
End: 2023-02-08
Attending: PSYCHIATRY & NEUROLOGY
Payer: MEDICARE

## 2023-02-08 VITALS
SYSTOLIC BLOOD PRESSURE: 126 MMHG | HEIGHT: 70 IN | HEART RATE: 100 BPM | OXYGEN SATURATION: 97 % | DIASTOLIC BLOOD PRESSURE: 74 MMHG | BODY MASS INDEX: 28.53 KG/M2 | WEIGHT: 199.3 LBS | RESPIRATION RATE: 16 BRPM

## 2023-02-08 DIAGNOSIS — G25.0 ESSENTIAL TREMOR: ICD-10-CM

## 2023-02-08 DIAGNOSIS — R20.0 RIGHT ARM NUMBNESS: ICD-10-CM

## 2023-02-08 PROBLEM — R25.1 TREMOR: Status: RESOLVED | Noted: 2023-02-03 | Resolved: 2023-02-08

## 2023-02-08 PROBLEM — G45.9 TIA (TRANSIENT ISCHEMIC ATTACK): Status: RESOLVED | Noted: 2022-07-29 | Resolved: 2023-02-08

## 2023-02-08 PROCEDURE — 99212 OFFICE O/P EST SF 10 MIN: CPT | Performed by: PSYCHIATRY & NEUROLOGY

## 2023-02-08 PROCEDURE — 99214 OFFICE O/P EST MOD 30 MIN: CPT | Performed by: PSYCHIATRY & NEUROLOGY

## 2023-02-08 ASSESSMENT — FIBROSIS 4 INDEX: FIB4 SCORE: 1.65

## 2023-02-08 NOTE — PROGRESS NOTES
Chief Complaint   Patient presents with    Possible Stroke       History of present illness:  Tristin Moscoso is a 67 y.o. man with a history most notable for HTN, HLD, PETER, TIA, tobacco dependence (former), stroke (2013), cerebellar atrophy, and BPH.  Today, he was unaccompanied, and he provided the following history:     2013:  Benedicto had a stroke.  His symptoms included numbness over the entire right side of the body.  He was placed on ASA and a statin.  The numbness over the right side improved after 2-3 days.     7/12/2022:  Benedicto began experiencing episodes of right upper extremity numbness and weakness.  He lost sensation to light touch but not to deep touch.  Each episode lasted 45-60 seconds.  There were 3-4 episodes.  Episodes typically occurred while in a seated position (usually at the computer).     10/2022:  The last episode occurred ~3 weeks ago     2/8/23: The numbness is no longer recurring. Previously, it affected his right arm down to the wrist and the right lateral chest, lasting about 10 minutes, and occurred when he was watching TV. This has occurred 3 times. There was about 15 days between the first and 2nd episodes.      Past medical history:   Past Medical History:   Diagnosis Date    Blood transfusion without reported diagnosis 11 yoa    during surgery/  In [place]    Cataract     s/p surgery christi    Dental disorder     full upper denture     Heart burn     High cholesterol     Hyperlipidemia     Kidney disease     kidney stone at 10 yoa    Other emphysema (HCC) 1/11/2022    Snoring     Stroke (HCC) 6/2013    see ER records; anant consult after. presented with left sided weakness. takes 81mg daily       Past surgical history:   Past Surgical History:   Procedure Laterality Date    SHOULDER ARTHROPLASTY TOTAL Left 1/2/2019    Procedure: SHOULDER ARTHROPLASTY TOTAL - REVERSE W/LATISSIMUS TRANSFER;  Surgeon: Marco Antonio oMrel M.D.;  Location: SURGERY SAME DAY [facility];  Service:  Orthopedics    VITRECTOMY POSTERIOR Right 2018    Procedure: VITRECTOMY POSTERIOR-  ENDO LASER, AIR FLUID EXCHANGE, SF6 INTRAOCULAR GAS;  Surgeon: Rolf Bee M.D.;  Location: SURGERY SAME DAY [facility];  Service: Ophthalmology    KNEE ARTHROSCOPY  2012    Performed by ERIK MUHAMMAD at SURGERY SAME DAY [facility]    MENISCECTOMY, KNEE, MEDIAL  2012    Performed by ERIK MUHAMMAD at SURGERY SAME DAY [facility]    OTHER  1983    kidney stones    ABDOMINAL EXPLORATION      EYE SURGERY      cataract bilateral-had lens    OTHER      lense implants 6-7 years       Family history:   Family History   Problem Relation Age of Onset    Seizures Mother     Cancer Sister         eldest sister-lung/chest       Social history:   Social History     Socioeconomic History    Marital status: Single     Spouse name: Not on file    Number of children: Not on file    Years of education: Not on file    Highest education level: GED or equivalent   Occupational History    Not on file   Tobacco Use    Smoking status: Former     Packs/day: 0.25     Years: 30.00     Pack years: 7.50     Types: Cigarettes, Cigars     Quit date: 2022     Years since quittin.5    Smokeless tobacco: Never   Vaping Use    Vaping Use: Never used   Substance and Sexual Activity    Alcohol use: No     Alcohol/week: 0.0 oz    Drug use: No    Sexual activity: Not Currently     Partners: Female   Other Topics Concern    Not on file   Social History Narrative    RETIRED -   at [employer].  .     Social Determinants of Health     Financial Resource Strain: Low Risk     Difficulty of Paying Living Expenses: Not hard at all   Food Insecurity: No Food Insecurity    Worried About Running Out of Food in the Last Year: Never true    Ran Out of Food in the Last Year: Never true   Transportation Needs: Unknown    Lack of Transportation (Medical): Patient refused    Lack of Transportation (Non-Medical): Patient refused  "  Physical Activity: Insufficiently Active    Days of Exercise per Week: 2 days    Minutes of Exercise per Session: 30 min   Stress: Not on file   Social Connections: Unknown    Frequency of Communication with Friends and Family: Three times a week    Frequency of Social Gatherings with Friends and Family: Twice a week    Attends [employer] Services: Not on file    Active Member of Clubs or Organizations: No    Attends Club or Organization Meetings: Never    Marital Status: Living with partner   Intimate Partner Violence: Not on file   Housing Stability: Unknown    Unable to Pay for Housing in the Last Year: Patient refused    Number of Places Lived in the Last Year: Not on file    Unstable Housing in the Last Year: Patient refused       Current medications:   Current Outpatient Medications   Medication    buPROPion SR (WELLBUTRIN-SR) 150 MG TABLET SR 12 HR sustained-release tablet    amlodipine-benazepril (LOTREL) 5-20 MG per capsule    omeprazole (PRILOSEC) 20 MG delayed-release capsule    meclizine (ANTIVERT) 12.5 MG Tab    atorvastatin (LIPITOR) 10 MG Tab    benzonatate (TESSALON) 100 MG Cap    alfuzosin (UROXATRAL) 10 MG SR tablet    fluticasone-salmeterol (ADVAIR) 250-50 MCG/DOSE AEROSOL POWDER, BREATH ACTIVATED    aspirin EC (ECOTRIN) 81 MG Tablet Delayed Response    finasteride (PROSCAR) 5 MG Tab     No current facility-administered medications for this visit.       Medication Allergy:  Allergies   Allergen Reactions    Kiwi Extract      Tongue itching     Penicillins Hives and Itching    Sulfa Drugs      Dizzy,tongue swells       Physical examination:   Vitals:    02/08/23 1515   BP: 126/74   BP Location: Left arm   Patient Position: Sitting   BP Cuff Size: Adult   Pulse: 100   Resp: 16   SpO2: 97%   Weight: 90.4 kg (199 lb 4.7 oz)   Height: 1.778 m (5' 10\")     Neurological Exam  Mental Status  Awake and alert. Speech is normal. Language is fluent with no aphasia.    Motor   The following abnormal movements " were seen: +Essential tremor.                                               Right                     Left  Finger flexion                         5                           Thumb flexion                        5                             Sensory  Light touch is normal in upper and lower extremities. Pinprick is normal in upper and lower extremities.     Coordination  Right: Finger-to-nose normal.Left: Finger-to-nose normal.     Labs:  I reviewed the following labs personally:  Lab Results   Component Value Date/Time    HBA1C 5.8 (H) 10/08/2020 09:03 AM      Lab Results   Component Value Date/Time    CHOLSTRLTOT 162 10/08/2020 0903    TRIGLYCERIDE 101 10/08/2020 0903    HDL 57 10/08/2020 0903    LDL 85 10/08/2020 0903         Imaging:   CTA neck   FINDINGS:  Aortic arch: conventional branching pattern.     There is atherosclerotic plaque of the aorta.     Right common carotid artery: Patent     Right internal carotid artery: Atherosclerotic plaque without significant stenosis (less than 50%).     Left common carotid artery is patent.     Left internal carotid artery: Atherosclerotic plaque without significant stenosis (less than 50%).     The right vertebral artery is patent without dissection or stenosis.     The left vertebral artery is patent without dissection or stenosis.     Vertebrobasilar confluence: The vertebrobasilar confluence appears normal.     Emphysematous change in the right lung apex.     The soft tissues of the neck are within normal limits.     3D angiographic/MIP images of the vasculature confirm the vascular findings as described above.    MRI brain   I reviewed the images personally and agree with the following read:     IMPRESSION:     1.  Chronic right posterior basal ganglia and corona radiata lacunar infarcts.  2.  No acute intracranial abnormality.    Transthoracic echo   Transthoracic  Echo Report        Echocardiography Laboratory     CONCLUSIONS  Compared to the images of the prior  study 06/18/2013, there has been no   significant change.      Normal left ventricular systolic function.  The left ventricular ejection fraction is visually estimated to be 55%.  Normal diastolic function.  The right ventricle is normal in size and systolic function.  Mild aortic insufficiency.    ASSESSMENT AND PLAN:  Problem List Items Addressed This Visit       Essential tremor     Other Visit Diagnoses       Right arm numbness                1. Essential tremor    2. Right arm numbness    This patient has had 3 episodes of right arm numbness in the forearm and upper arm area that previously had been diagnosed as a transient ischemic attack.  He had an extensive work-up for TIA that was unremarkable, with normal MRI of the brain, CT angiogram of the head and neck, and echocardiogram.  Do not think that the history is consistent with a TIA event.  He had numbness of the right forearm that occurred once when he was using his computer mouse, and another time when he was not engaged in any activity.  He describes having to shake out the numbness.  I do not think that this was a vascular event.  More likely this was related to carpal tunnel.  The symptoms have resolved.  There is no need for any further management, given that his vascular work-up has been normal.    FOLLOW-UP:   Return if symptoms worsen or fail to improve.    Total time spent for the day for this patient unrelated to procedure time is: 26 minutes. I spent 17 minutes in face to face time and I spent 4 minutes pre-charting and 5 minutes in post-visit documentation.      ROB FaustO.  [facility]

## 2023-03-09 RX ORDER — BENZONATATE 100 MG/1
100-200 CAPSULE ORAL 3 TIMES DAILY PRN
Qty: 100 CAPSULE | Refills: 0 | Status: SHIPPED | OUTPATIENT
Start: 2023-03-09 | End: 2023-07-06

## 2023-04-21 ENCOUNTER — TELEPHONE (OUTPATIENT)
Dept: MEDICAL GROUP | Facility: PHYSICIAN GROUP | Age: 68
End: 2023-04-21
Payer: MEDICARE

## 2023-04-21 NOTE — TELEPHONE ENCOUNTER
VOICEMAIL  1. Caller Name: Benedicto                      Call Back Number: [phone]     2. Message: Pt left vm requesting to know if it was okay to have labs done on Monday and if Dr. Kumari would have the results day of his appointment     3. Patient approves office to leave a detailed voicemail/[facility]Door[facility] message: N\A    Phone Number Called: [phone]     Call outcome: Left detailed message for patient. Informed to call back with any additional questions.    Message: Left vm for patient informing him he should be okay to get labs done and Dr. Kumari should have results by appointment date.

## 2023-04-27 ENCOUNTER — OFFICE VISIT (OUTPATIENT)
Dept: MEDICAL GROUP | Facility: PHYSICIAN GROUP | Age: 68
End: 2023-04-27
Payer: MEDICARE

## 2023-04-27 VITALS
DIASTOLIC BLOOD PRESSURE: 72 MMHG | HEART RATE: 70 BPM | OXYGEN SATURATION: 96 % | TEMPERATURE: 99 F | SYSTOLIC BLOOD PRESSURE: 126 MMHG | WEIGHT: 197 LBS | HEIGHT: 70 IN | BODY MASS INDEX: 28.2 KG/M2

## 2023-04-27 DIAGNOSIS — E78.2 MIXED HYPERLIPIDEMIA: ICD-10-CM

## 2023-04-27 DIAGNOSIS — R73.03 PREDIABETES: ICD-10-CM

## 2023-04-27 PROCEDURE — 99214 OFFICE O/P EST MOD 30 MIN: CPT | Performed by: FAMILY MEDICINE

## 2023-04-27 ASSESSMENT — FIBROSIS 4 INDEX: FIB4 SCORE: 1.65

## 2023-04-27 NOTE — PROGRESS NOTES
Subjective:     Chief Complaint   Patient presents with    Follow-Up     6 month , TIA?       HPI:   Tristin presents today to discuss the following.    Mixed hyperlipidemia  Chronic issue  On statin 10mg     Prediabetes  Chronic issue  Due for labs    Past Medical History:   Diagnosis Date    Blood transfusion without reported diagnosis 11 yoa    during surgery/  In [place]    Cataract     s/p surgery christi    Dental disorder     full upper denture     Heart burn     High cholesterol     Hyperlipidemia     Kidney disease     kidney stone at 10 yoa    Other emphysema (HCC) 1/11/2022    Snoring     Stroke (HCC) 6/2013    see ER records; anant consult after. presented with left sided weakness. takes 81mg daily       Current Outpatient Medications Ordered in Epic   Medication Sig Dispense Refill    buPROPion SR (WELLBUTRIN-SR) 150 MG TABLET SR 12 HR sustained-release tablet TAKE 1 TABLET BY MOUTH TWICE A  Tablet 1    benzonatate (TESSALON) 100 MG Cap Take 1-2 Capsules by mouth 3 times a day as needed for Cough. 100 Capsule 0    amlodipine-benazepril (LOTREL) 5-20 MG per capsule TAKE 1 CAPSULE BY MOUTH EVERY  Capsule 3    omeprazole (PRILOSEC) 20 MG delayed-release capsule TAKE 1 CAPSULE BY MOUTH EVERY DAY 90 Capsule 3    meclizine (ANTIVERT) 12.5 MG Tab Take 1 Tablet by mouth 3 times a day as needed for Dizziness. 30 Tablet 0    atorvastatin (LIPITOR) 10 MG Tab TAKE 1 TABLET BY MOUTH EVERY  Tablet 3    alfuzosin (UROXATRAL) 10 MG SR tablet alfuzosin ER 10 mg tablet,extended release 24 hr   TAKE 1 TABLET EVERY DAY BY ORAL ROUTE AT BEDTIME FOR 30 DAYS.      fluticasone-salmeterol (ADVAIR) 250-50 MCG/DOSE AEROSOL POWDER, BREATH ACTIVATED Inhale 1 Puff every 12 hours. 60 Each 3    aspirin EC (ECOTRIN) 81 MG Tablet Delayed Response Take 1 Tablet by mouth every day.      finasteride (PROSCAR) 5 MG Tab Take 1 Tablet by mouth at bedtime.  1     No current [facility]-ordered facility-administered medications on  "file.       Allergies:  Kiwi extract, Penicillins, and Sulfa drugs    Health Maintenance: Completed    ROS:  Gen: no fevers/chills, no changes in weight  Eyes: no changes in vision  Pulm: no sob, no cough  CV: no chest pain, no palpitations  GI: no nausea/vomiting, no diarrhea      Objective:     Exam:  /72 (BP Location: Left arm, Patient Position: Sitting, BP Cuff Size: Adult)   Pulse 70   Temp 37.2 °C (99 °F) (Temporal)   Ht 1.778 m (5' 10\")   Wt 89.4 kg (197 lb)   SpO2 96%   BMI 28.27 kg/m²  Body mass index is 28.27 kg/m².        Gen: Alert and oriented, No apparent distress.  Eyes: PERRLA  Lungs: Normal effort, CTA bilaterally, no wheezes, rhonchi, or rales  CV: Regular rate and rhythm. No murmurs, rubs, or gallops.  Ext: No clubbing, cyanosis, edema.  Skin: no rash, lesions or ulcers  Neuro: Moves all extremities.  Psych: AAOx3          Assessment & Plan:     67 y.o. male with the following -     1. Mixed hyperlipidemia  Chronic, stable condition.  Continue with statin.  Due for repeat labs.  - CBC WITHOUT DIFFERENTIAL; Future  - Basic Metabolic Panel; Future  - Lipid Profile; Future  - HEMOGLOBIN A1C; Future    2. Prediabetes  Chronic, stable condition.  Due for repeat labs.  Continue with lifestyle change.  - HEMOGLOBIN A1C; Future      Return in about 6 months (around 10/27/2023).          Please note that this dictation was created using voice recognition software. I have made every reasonable attempt to correct obvious errors, but I expect that there are errors of grammar and possibly content that I did not discover before finalizing the note.        "

## 2023-05-02 ENCOUNTER — HOSPITAL ENCOUNTER (OUTPATIENT)
Dept: LAB | Facility: MEDICAL CENTER | Age: 68
End: 2023-05-02
Attending: FAMILY MEDICINE
Payer: MEDICARE

## 2023-05-02 DIAGNOSIS — E78.2 MIXED HYPERLIPIDEMIA: ICD-10-CM

## 2023-05-02 DIAGNOSIS — R73.03 PREDIABETES: ICD-10-CM

## 2023-05-02 LAB
ANION GAP SERPL CALC-SCNC: 14 MMOL/L (ref 7–16)
BUN SERPL-MCNC: 17 MG/DL (ref 8–22)
CALCIUM SERPL-MCNC: 9.3 MG/DL (ref 8.5–10.5)
CHLORIDE SERPL-SCNC: 105 MMOL/L (ref 96–112)
CHOLEST SERPL-MCNC: 166 MG/DL (ref 100–199)
CO2 SERPL-SCNC: 23 MMOL/L (ref 20–33)
CREAT SERPL-MCNC: 1.08 MG/DL (ref 0.5–1.4)
ERYTHROCYTE [DISTWIDTH] IN BLOOD BY AUTOMATED COUNT: 41.6 FL (ref 35.9–50)
EST. AVERAGE GLUCOSE BLD GHB EST-MCNC: 123 MG/DL
FASTING STATUS PATIENT QL REPORTED: NORMAL
GFR SERPLBLD CREATININE-BSD FMLA CKD-EPI: 74 ML/MIN/1.73 M 2
GLUCOSE SERPL-MCNC: 114 MG/DL (ref 65–99)
HBA1C MFR BLD: 5.9 % (ref 4–5.6)
HCT VFR BLD AUTO: 46.7 % (ref 42–52)
HDLC SERPL-MCNC: 49 MG/DL
HGB BLD-MCNC: 15.9 G/DL (ref 14–18)
LDLC SERPL CALC-MCNC: 88 MG/DL
MCH RBC QN AUTO: 30.5 PG (ref 27–33)
MCHC RBC AUTO-ENTMCNC: 34 G/DL (ref 33.7–35.3)
MCV RBC AUTO: 89.5 FL (ref 81.4–97.8)
PLATELET # BLD AUTO: 177 K/UL (ref 164–446)
PMV BLD AUTO: 10.2 FL (ref 9–12.9)
POTASSIUM SERPL-SCNC: 4.4 MMOL/L (ref 3.6–5.5)
RBC # BLD AUTO: 5.22 M/UL (ref 4.7–6.1)
SODIUM SERPL-SCNC: 142 MMOL/L (ref 135–145)
TRIGL SERPL-MCNC: 143 MG/DL (ref 0–149)
WBC # BLD AUTO: 7.7 K/UL (ref 4.8–10.8)

## 2023-05-02 PROCEDURE — 80061 LIPID PANEL: CPT

## 2023-05-02 PROCEDURE — 85027 COMPLETE CBC AUTOMATED: CPT

## 2023-05-02 PROCEDURE — 80048 BASIC METABOLIC PNL TOTAL CA: CPT

## 2023-05-02 PROCEDURE — 36415 COLL VENOUS BLD VENIPUNCTURE: CPT

## 2023-05-02 PROCEDURE — 83036 HEMOGLOBIN GLYCOSYLATED A1C: CPT

## 2023-06-09 ENCOUNTER — HOSPITAL ENCOUNTER (OUTPATIENT)
Dept: LAB | Facility: MEDICAL CENTER | Age: 68
End: 2023-06-09
Attending: PHYSICIAN ASSISTANT
Payer: MEDICARE

## 2023-06-09 LAB — PSA SERPL-MCNC: 1.31 NG/ML (ref 0–4)

## 2023-06-09 PROCEDURE — 84153 ASSAY OF PSA TOTAL: CPT

## 2023-06-09 PROCEDURE — 36415 COLL VENOUS BLD VENIPUNCTURE: CPT

## 2023-06-20 ENCOUNTER — HOSPITAL ENCOUNTER (OUTPATIENT)
Facility: MEDICAL CENTER | Age: 68
End: 2023-06-20
Attending: PHYSICIAN ASSISTANT
Payer: MEDICARE

## 2023-06-20 PROCEDURE — 87086 URINE CULTURE/COLONY COUNT: CPT

## 2023-06-20 PROCEDURE — 87186 SC STD MICRODIL/AGAR DIL: CPT

## 2023-06-20 PROCEDURE — 87077 CULTURE AEROBIC IDENTIFY: CPT

## 2023-06-23 LAB
BACTERIA UR CULT: ABNORMAL
SIGNIFICANT IND 70042: ABNORMAL
SITE SITE: ABNORMAL
SOURCE SOURCE: ABNORMAL

## 2023-07-06 RX ORDER — AMLODIPINE BESYLATE AND BENAZEPRIL HYDROCHLORIDE 5; 20 MG/1; MG/1
1 CAPSULE ORAL DAILY
Qty: 100 CAPSULE | Refills: 0 | Status: SHIPPED | OUTPATIENT
Start: 2023-07-06 | End: 2023-10-26 | Stop reason: SDUPTHER

## 2023-07-06 RX ORDER — BENZONATATE 100 MG/1
CAPSULE ORAL
Qty: 100 CAPSULE | Refills: 0 | Status: SHIPPED | OUTPATIENT
Start: 2023-07-06

## 2023-07-21 DIAGNOSIS — K21.9 GASTROESOPHAGEAL REFLUX DISEASE WITHOUT ESOPHAGITIS: ICD-10-CM

## 2023-07-21 RX ORDER — OMEPRAZOLE 20 MG/1
20 CAPSULE, DELAYED RELEASE ORAL
Qty: 90 CAPSULE | Refills: 0 | Status: SHIPPED | OUTPATIENT
Start: 2023-07-21 | End: 2023-10-26 | Stop reason: SDUPTHER

## 2023-08-10 SDOH — ECONOMIC STABILITY: INCOME INSECURITY: IN THE LAST 12 MONTHS, WAS THERE A TIME WHEN YOU WERE NOT ABLE TO PAY THE MORTGAGE OR RENT ON TIME?: NO

## 2023-08-10 SDOH — ECONOMIC STABILITY: INCOME INSECURITY: HOW HARD IS IT FOR YOU TO PAY FOR THE VERY BASICS LIKE FOOD, HOUSING, MEDICAL CARE, AND HEATING?: NOT HARD AT ALL

## 2023-08-10 SDOH — ECONOMIC STABILITY: HOUSING INSECURITY
IN THE LAST 12 MONTHS, WAS THERE A TIME WHEN YOU DID NOT HAVE A STEADY PLACE TO SLEEP OR SLEPT IN A SHELTER (INCLUDING NOW)?: NO

## 2023-08-10 SDOH — HEALTH STABILITY: PHYSICAL HEALTH: ON AVERAGE, HOW MANY MINUTES DO YOU ENGAGE IN EXERCISE AT THIS LEVEL?: 30 MIN

## 2023-08-10 SDOH — HEALTH STABILITY: PHYSICAL HEALTH: ON AVERAGE, HOW MANY DAYS PER WEEK DO YOU ENGAGE IN MODERATE TO STRENUOUS EXERCISE (LIKE A BRISK WALK)?: 3 DAYS

## 2023-08-10 SDOH — ECONOMIC STABILITY: FOOD INSECURITY: WITHIN THE PAST 12 MONTHS, YOU WORRIED THAT YOUR FOOD WOULD RUN OUT BEFORE YOU GOT MONEY TO BUY MORE.: SOMETIMES TRUE

## 2023-08-10 SDOH — ECONOMIC STABILITY: TRANSPORTATION INSECURITY
IN THE PAST 12 MONTHS, HAS LACK OF RELIABLE TRANSPORTATION KEPT YOU FROM MEDICAL APPOINTMENTS, MEETINGS, WORK OR FROM GETTING THINGS NEEDED FOR DAILY LIVING?: NO

## 2023-08-10 SDOH — ECONOMIC STABILITY: TRANSPORTATION INSECURITY
IN THE PAST 12 MONTHS, HAS THE LACK OF TRANSPORTATION KEPT YOU FROM MEDICAL APPOINTMENTS OR FROM GETTING MEDICATIONS?: NO

## 2023-08-10 SDOH — HEALTH STABILITY: MENTAL HEALTH
STRESS IS WHEN SOMEONE FEELS TENSE, NERVOUS, ANXIOUS, OR CAN'T SLEEP AT NIGHT BECAUSE THEIR MIND IS TROUBLED. HOW STRESSED ARE YOU?: ONLY A LITTLE

## 2023-08-10 SDOH — ECONOMIC STABILITY: HOUSING INSECURITY

## 2023-08-10 SDOH — ECONOMIC STABILITY: FOOD INSECURITY: WITHIN THE PAST 12 MONTHS, THE FOOD YOU BOUGHT JUST DIDN'T LAST AND YOU DIDN'T HAVE MONEY TO GET MORE.: PATIENT DECLINED

## 2023-08-10 SDOH — ECONOMIC STABILITY: TRANSPORTATION INSECURITY
IN THE PAST 12 MONTHS, HAS LACK OF TRANSPORTATION KEPT YOU FROM MEETINGS, WORK, OR FROM GETTING THINGS NEEDED FOR DAILY LIVING?: NO

## 2023-08-10 ASSESSMENT — SOCIAL DETERMINANTS OF HEALTH (SDOH)
HOW OFTEN DO YOU ATTENT MEETINGS OF THE CLUB OR ORGANIZATION YOU BELONG TO?: MORE THAN 4 TIMES PER YEAR
IN A TYPICAL WEEK, HOW MANY TIMES DO YOU TALK ON THE PHONE WITH FAMILY, FRIENDS, OR NEIGHBORS?: MORE THAN THREE TIMES A WEEK
HOW OFTEN DO YOU HAVE A DRINK CONTAINING ALCOHOL: NEVER
DO YOU BELONG TO ANY CLUBS OR ORGANIZATIONS SUCH AS CHURCH GROUPS UNIONS, FRATERNAL OR ATHLETIC GROUPS, OR SCHOOL GROUPS?: PATIENT DECLINED
ARE YOU MARRIED, WIDOWED, DIVORCED, SEPARATED, NEVER MARRIED, OR LIVING WITH A PARTNER?: LIVING WITH PARTNER
HOW OFTEN DO YOU GET TOGETHER WITH FRIENDS OR RELATIVES?: MORE THAN THREE TIMES A WEEK
DO YOU BELONG TO ANY CLUBS OR ORGANIZATIONS SUCH AS CHURCH GROUPS UNIONS, FRATERNAL OR ATHLETIC GROUPS, OR SCHOOL GROUPS?: PATIENT DECLINED
ARE YOU MARRIED, WIDOWED, DIVORCED, SEPARATED, NEVER MARRIED, OR LIVING WITH A PARTNER?: LIVING WITH PARTNER
WITHIN THE PAST 12 MONTHS, YOU WORRIED THAT YOUR FOOD WOULD RUN OUT BEFORE YOU GOT THE MONEY TO BUY MORE: SOMETIMES TRUE
HOW HARD IS IT FOR YOU TO PAY FOR THE VERY BASICS LIKE FOOD, HOUSING, MEDICAL CARE, AND HEATING?: NOT HARD AT ALL
HOW OFTEN DO YOU GET TOGETHER WITH FRIENDS OR RELATIVES?: MORE THAN THREE TIMES A WEEK
HOW OFTEN DO YOU ATTENT MEETINGS OF THE CLUB OR ORGANIZATION YOU BELONG TO?: MORE THAN 4 TIMES PER YEAR
IN A TYPICAL WEEK, HOW MANY TIMES DO YOU TALK ON THE PHONE WITH FAMILY, FRIENDS, OR NEIGHBORS?: MORE THAN THREE TIMES A WEEK

## 2023-08-10 ASSESSMENT — LIFESTYLE VARIABLES: HOW OFTEN DO YOU HAVE A DRINK CONTAINING ALCOHOL: NEVER

## 2023-08-11 ENCOUNTER — OFFICE VISIT (OUTPATIENT)
Dept: MEDICAL GROUP | Facility: PHYSICIAN GROUP | Age: 68
End: 2023-08-11
Payer: MEDICARE

## 2023-08-11 VITALS
WEIGHT: 191 LBS | HEART RATE: 83 BPM | SYSTOLIC BLOOD PRESSURE: 128 MMHG | BODY MASS INDEX: 27.35 KG/M2 | HEIGHT: 70 IN | TEMPERATURE: 98.3 F | OXYGEN SATURATION: 95 % | DIASTOLIC BLOOD PRESSURE: 74 MMHG

## 2023-08-11 DIAGNOSIS — R39.12 BENIGN PROSTATIC HYPERPLASIA WITH WEAK URINARY STREAM: ICD-10-CM

## 2023-08-11 DIAGNOSIS — Z00.00 MEDICARE ANNUAL WELLNESS VISIT, SUBSEQUENT: Primary | ICD-10-CM

## 2023-08-11 DIAGNOSIS — E78.2 MIXED HYPERLIPIDEMIA: ICD-10-CM

## 2023-08-11 DIAGNOSIS — F17.200 TOBACCO DEPENDENCE: ICD-10-CM

## 2023-08-11 DIAGNOSIS — J43.8 OTHER EMPHYSEMA (HCC): ICD-10-CM

## 2023-08-11 DIAGNOSIS — I10 PRIMARY HYPERTENSION: ICD-10-CM

## 2023-08-11 DIAGNOSIS — N40.1 BENIGN PROSTATIC HYPERPLASIA WITH WEAK URINARY STREAM: ICD-10-CM

## 2023-08-11 PROCEDURE — G0439 PPPS, SUBSEQ VISIT: HCPCS | Performed by: FAMILY MEDICINE

## 2023-08-11 PROCEDURE — 3074F SYST BP LT 130 MM HG: CPT | Performed by: FAMILY MEDICINE

## 2023-08-11 PROCEDURE — 3078F DIAST BP <80 MM HG: CPT | Performed by: FAMILY MEDICINE

## 2023-08-11 RX ORDER — AZELASTINE 1 MG/ML
1 SPRAY, METERED NASAL 2 TIMES DAILY
Qty: 30 ML | Refills: 3 | Status: SHIPPED | OUTPATIENT
Start: 2023-08-11 | End: 2023-09-05

## 2023-08-11 ASSESSMENT — FIBROSIS 4 INDEX: FIB4 SCORE: 1.74

## 2023-08-11 ASSESSMENT — PATIENT HEALTH QUESTIONNAIRE - PHQ9: CLINICAL INTERPRETATION OF PHQ2 SCORE: 0

## 2023-08-11 ASSESSMENT — ACTIVITIES OF DAILY LIVING (ADL): BATHING_REQUIRES_ASSISTANCE: 0

## 2023-08-11 ASSESSMENT — ENCOUNTER SYMPTOMS: GENERAL WELL-BEING: GOOD
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
## 2023-08-15 ENCOUNTER — DOCUMENTATION (OUTPATIENT)
Dept: HEALTH INFORMATION MANAGEMENT | Facility: OTHER | Age: 68
End: 2023-08-15
Payer: MEDICARE

## 2023-09-05 RX ORDER — AZELASTINE HYDROCHLORIDE 137 UG/1
1 SPRAY, METERED NASAL 2 TIMES DAILY
Qty: 30 ML | Refills: 3 | Status: SHIPPED | OUTPATIENT
Start: 2023-09-05 | End: 2023-10-06

## 2023-10-04 RX ORDER — BUPROPION HYDROCHLORIDE 150 MG/1
1 TABLET, EXTENDED RELEASE ORAL 2 TIMES DAILY
COMMUNITY
End: 2023-10-04

## 2023-10-04 RX ORDER — OMEPRAZOLE 20 MG/1
1 CAPSULE, DELAYED RELEASE ORAL
COMMUNITY
End: 2023-10-04

## 2023-10-04 RX ORDER — ALFUZOSIN HYDROCHLORIDE 10 MG/1
1 TABLET, EXTENDED RELEASE ORAL
COMMUNITY
End: 2024-01-18

## 2023-10-04 RX ORDER — HYDROCHLOROTHIAZIDE 25 MG/1
1 TABLET ORAL
COMMUNITY
End: 2023-10-06

## 2023-10-04 RX ORDER — ATORVASTATIN CALCIUM 10 MG/1
1 TABLET, FILM COATED ORAL
COMMUNITY
End: 2023-10-04

## 2023-10-04 RX ORDER — AMLODIPINE BESYLATE AND BENAZEPRIL HYDROCHLORIDE 5; 20 MG/1; MG/1
1 CAPSULE ORAL
COMMUNITY
End: 2023-10-04

## 2023-10-04 RX ORDER — FLUTICASONE PROPIONATE AND SALMETEROL 250; 50 UG/1; UG/1
1 POWDER RESPIRATORY (INHALATION) EVERY 12 HOURS
COMMUNITY
End: 2023-10-04

## 2023-10-04 RX ORDER — FINASTERIDE 5 MG/1
1 TABLET, FILM COATED ORAL
COMMUNITY
End: 2023-10-04

## 2023-10-04 RX ORDER — NITROFURANTOIN 25; 75 MG/1; MG/1
CAPSULE ORAL
COMMUNITY
End: 2023-10-06

## 2023-10-06 ENCOUNTER — OFFICE VISIT (OUTPATIENT)
Dept: MEDICAL GROUP | Facility: PHYSICIAN GROUP | Age: 68
End: 2023-10-06
Payer: MEDICARE

## 2023-10-06 VITALS
TEMPERATURE: 98.9 F | RESPIRATION RATE: 16 BRPM | DIASTOLIC BLOOD PRESSURE: 64 MMHG | HEART RATE: 75 BPM | SYSTOLIC BLOOD PRESSURE: 114 MMHG | BODY MASS INDEX: 27.74 KG/M2 | HEIGHT: 71 IN | OXYGEN SATURATION: 97 % | WEIGHT: 198.13 LBS

## 2023-10-06 DIAGNOSIS — R73.03 PREDIABETES: ICD-10-CM

## 2023-10-06 DIAGNOSIS — J43.8 OTHER EMPHYSEMA (HCC): ICD-10-CM

## 2023-10-06 DIAGNOSIS — Z92.89 HISTORY OF POSITIVE PPD: ICD-10-CM

## 2023-10-06 DIAGNOSIS — Z23 NEED FOR VACCINATION: ICD-10-CM

## 2023-10-06 DIAGNOSIS — R05.3 CHRONIC COUGH: ICD-10-CM

## 2023-10-06 DIAGNOSIS — Z86.73 HISTORY OF CVA (CEREBROVASCULAR ACCIDENT) WITHOUT RESIDUAL DEFICITS: ICD-10-CM

## 2023-10-06 DIAGNOSIS — E55.9 VITAMIN D DEFICIENCY: ICD-10-CM

## 2023-10-06 DIAGNOSIS — E78.5 DYSLIPIDEMIA: ICD-10-CM

## 2023-10-06 DIAGNOSIS — Z87.891 FORMER SMOKER: ICD-10-CM

## 2023-10-06 DIAGNOSIS — S60.511A: ICD-10-CM

## 2023-10-06 DIAGNOSIS — K21.9 GASTROESOPHAGEAL REFLUX DISEASE WITHOUT ESOPHAGITIS: ICD-10-CM

## 2023-10-06 PROBLEM — R06.02 SOB (SHORTNESS OF BREATH): Status: RESOLVED | Noted: 2021-07-02 | Resolved: 2023-10-06

## 2023-10-06 PROBLEM — L60.0 INGROWN TOENAIL: Status: RESOLVED | Noted: 2021-08-27 | Resolved: 2023-10-06

## 2023-10-06 PROBLEM — R60.0 PEDAL EDEMA: Status: RESOLVED | Noted: 2022-04-19 | Resolved: 2023-10-06

## 2023-10-06 PROCEDURE — 3074F SYST BP LT 130 MM HG: CPT | Performed by: PHYSICIAN ASSISTANT

## 2023-10-06 PROCEDURE — 99214 OFFICE O/P EST MOD 30 MIN: CPT | Mod: 25 | Performed by: PHYSICIAN ASSISTANT

## 2023-10-06 PROCEDURE — 90471 IMMUNIZATION ADMIN: CPT | Performed by: PHYSICIAN ASSISTANT

## 2023-10-06 PROCEDURE — 3078F DIAST BP <80 MM HG: CPT | Performed by: PHYSICIAN ASSISTANT

## 2023-10-06 PROCEDURE — 90715 TDAP VACCINE 7 YRS/> IM: CPT | Performed by: PHYSICIAN ASSISTANT

## 2023-10-06 ASSESSMENT — ENCOUNTER SYMPTOMS
FEVER: 0
SHORTNESS OF BREATH: 0
CHILLS: 0

## 2023-10-06 ASSESSMENT — FIBROSIS 4 INDEX: FIB4 SCORE: 1.74

## 2023-10-06 NOTE — PROGRESS NOTES
Annual Health Assessment Questions:    1.  Are you currently engaging in any exercise or physical activity? No    2.  How would you describe your mood or emotional well-being today? good    3.  Have you had any falls in the last year? No    4.  Have you noticed any problems with your balance or had difficulty walking? No    5.  In the last six months have you experienced any leakage of urine? Yes    6. DPA/Advanced Directive: Patient does not have an Advance Directive.  A packet and workshop information was given on Advance Directives.

## 2023-10-06 NOTE — PROGRESS NOTES
Subjective:     CC: establish care; prior Dr. Kumari    HPI:   Tristin presents today with the following:      Problem   Dyslipidemia    Chronic, controlled.     Latest Labs:   Lab Results   Component Value Date/Time    CHOLSTRLTOT 166 05/02/2023 09:22 AM    LDL 88 05/02/2023 09:22 AM    HDL 49 05/02/2023 09:22 AM    TRIGLYCERIDE 143 05/02/2023 09:22 AM      Medications: Atorvastatin 10 mg daily.  Medication side effects: None    Risk calculator: The 10-year ASCVD risk score (Sal BOLDEN, et al., 2019) is: 15.9%        Prediabetes    Chronic, stable.  A1c 5.9 (5/2023.     Former Smoker    Quit smoking early 2023 with the help of Wellbutrin.  He continues to use Wellbutrin so that he will not start smoking again.     Other Emphysema (Hcc)    Chronic, stable.  PFT, 2003, reduction in FEV1/FVC, consistent with mild obstructive pulmonary disease.  Patient quit smoking early 2023.  Patient stopped taking Advair.     Chronic Cough    Chronic, uncontrolled.  Started years ago.  Started before he got COVID.  Tolerating benzonotate as needed.  occasional SOB.       Vitamin D Deficiency    Chronic, control unknown.  Not currently taking supplementation.     Gastroesophageal Reflux Disease Without Esophagitis    Chronic, stable.  Continue omeprazole 20 mg daily.     History of Positive Ppd    History of BCG vaccination.     Brian (Obstructive Sleep Apnea)    Chronic, control unknown.  Patient does not use CPAP.  Was told he had sleep apnea when he had a stroke about 10 years ago but has not had any issues since.     History of Cva (Cerebrovascular Accident) Without Residual Deficits    Chronic, controlled.  CVA 6/2013.  Saw neurology briefly.  No residual deficit.     Pedal Edema (Resolved)   Ingrown Toenail (Resolved)   Sob (Shortness of Breath) (Resolved)   Igt (Impaired Glucose Tolerance) (Resolved)   Hypercholesteremia (Resolved)   Tobacco Dependence (Resolved)       ROS:  Review of Systems   Constitutional:  Negative for  "chills and fever.   Respiratory:  Negative for shortness of breath.    Cardiovascular:  Negative for chest pain.       Objective:     Exam:  /64 (BP Location: Left arm, Patient Position: Sitting, BP Cuff Size: Large adult)   Pulse 75   Temp 37.2 °C (98.9 °F) (Temporal)   Resp 16   Ht 1.791 m (5' 10.5\")   Wt 89.9 kg (198 lb 2 oz)   SpO2 97%   BMI 28.03 kg/m²  Body mass index is 28.03 kg/m².    Physical Exam  Vitals reviewed.   Constitutional:       General: He is not in acute distress.     Appearance: Normal appearance.   HENT:      Head: Normocephalic.   Eyes:      Conjunctiva/sclera: Conjunctivae normal.   Cardiovascular:      Rate and Rhythm: Normal rate and regular rhythm.      Pulses: Normal pulses.      Heart sounds: No murmur heard.     No gallop.   Pulmonary:      Effort: Pulmonary effort is normal. No respiratory distress.      Breath sounds: No wheezing.   Musculoskeletal:         General: No swelling.   Skin:     General: Skin is warm and dry.   Neurological:      General: No focal deficit present.      Mental Status: He is alert.   Psychiatric:         Mood and Affect: Mood normal.         Behavior: Behavior normal.         Judgment: Judgment normal.             Assessment & Plan:     68 y.o. male with the following -     1. Prediabetes  Chronic, stable.  Repeat labs in 6 to 9 months.    - HEMOGLOBIN A1C; Future    2. Chronic cough  Chronic, control unknown.  Negative chest x-ray July 2022 when patient had the same symptoms.  Referring to the lung cancer screening program due to his smoking history.    3. Dyslipidemia  Chronic, controlled.  Continue atorvastatin 10 mg daily.    4. Vitamin D deficiency  Chronic, control unknown.  No longer taking supplementation.      5. Gastroesophageal reflux disease without esophagitis  Chronic, stable.  Continue omeprazole 20 mg daily.    6. Former smoker  Quit smoking early 2023.  Started smoking at 14 years old.  Smoked anywhere from 5 cigarettes a day " to a full pack per day.  Referring to the lung cancer screening program.    - REFERRAL TO LUNG CANCER SCREENING PROGRAM    7. History of CVA (cerebrovascular accident) without residual deficits  Around 2013.  Denies any residual deficit    8. Need for vaccination    - Tdap Vaccine =>6YO IM    9. Scratch of right hand  Small scratch noted on the right hand without signs of infection.    - Tdap Vaccine =>4YO IM        Please note that this dictation was created using voice recognition software. I have made every reasonable attempt to correct obvious errors, but I expect that there are errors of grammar and possibly content that I did not discover before finalizing the note.

## 2023-10-13 ENCOUNTER — TELEPHONE (OUTPATIENT)
Dept: SLEEP MEDICINE | Facility: MEDICAL CENTER | Age: 68
End: 2023-10-13
Payer: MEDICARE

## 2023-10-13 NOTE — TELEPHONE ENCOUNTER
1. Age 50-77 yrs of age? 68 yrs    2. Total Years Smoking cigarettes? 54 yrs    3. 20 pack year hx of smoking, or greater (average packs per day)?   27 pk yrs @ 0.5 pk/day     4. Current smoker or if quit, has pt quit within last 15 yrs?  Quit 6 mos    5. Completed Lung Cancer screen CT with [facility] previously?  NONE  6. Anything noted on previous CT involving lungs? ([place], [place], Etc.)  NONE  7. Any signs or symptoms of lung cancer? ( New / change to Cough, Wheezing, S.O.B., coughing up blood, unexplained weight loss within last year)?   NONE    8. Previous history of lung cancer?   NONE
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
## 2023-10-24 ENCOUNTER — OFFICE VISIT (OUTPATIENT)
Dept: SLEEP MEDICINE | Facility: MEDICAL CENTER | Age: 68
End: 2023-10-24
Attending: FAMILY MEDICINE
Payer: MEDICARE

## 2023-10-24 VITALS
RESPIRATION RATE: 16 BRPM | HEART RATE: 77 BPM | SYSTOLIC BLOOD PRESSURE: 140 MMHG | OXYGEN SATURATION: 97 % | HEIGHT: 70 IN | BODY MASS INDEX: 28.35 KG/M2 | DIASTOLIC BLOOD PRESSURE: 80 MMHG | WEIGHT: 198 LBS

## 2023-10-24 DIAGNOSIS — F17.211 CIGARETTE NICOTINE DEPENDENCE IN REMISSION: ICD-10-CM

## 2023-10-24 PROCEDURE — 3077F SYST BP >= 140 MM HG: CPT | Performed by: FAMILY MEDICINE

## 2023-10-24 PROCEDURE — G0296 VISIT TO DETERM LDCT ELIG: HCPCS | Performed by: FAMILY MEDICINE

## 2023-10-24 PROCEDURE — 3079F DIAST BP 80-89 MM HG: CPT | Performed by: FAMILY MEDICINE

## 2023-10-24 ASSESSMENT — ENCOUNTER SYMPTOMS
WHEEZING: 1
WEIGHT LOSS: 0
SPUTUM PRODUCTION: 0
CHILLS: 0
SHORTNESS OF BREATH: 1
HEMOPTYSIS: 0
PALPITATIONS: 0
COUGH: 0
FEVER: 0

## 2023-10-24 ASSESSMENT — FIBROSIS 4 INDEX: FIB4 SCORE: 1.74

## 2023-10-24 NOTE — Clinical Note
Thank you for referring Benedicto to the Lung Cancer Screening program.  I enrolled him today. I will update you re: abnormal findings. -Dr. Mercedes Graff

## 2023-10-26 ENCOUNTER — HOSPITAL ENCOUNTER (OUTPATIENT)
Dept: RADIOLOGY | Facility: MEDICAL CENTER | Age: 68
End: 2023-10-26
Attending: FAMILY MEDICINE
Payer: MEDICARE

## 2023-10-26 DIAGNOSIS — F17.211 CIGARETTE NICOTINE DEPENDENCE IN REMISSION: ICD-10-CM

## 2023-10-26 DIAGNOSIS — F17.200 TOBACCO DEPENDENCE: ICD-10-CM

## 2023-10-26 DIAGNOSIS — K21.9 GASTROESOPHAGEAL REFLUX DISEASE WITHOUT ESOPHAGITIS: ICD-10-CM

## 2023-10-26 DIAGNOSIS — G45.9 TIA (TRANSIENT ISCHEMIC ATTACK): ICD-10-CM

## 2023-10-26 PROCEDURE — 71271 CT THORAX LUNG CANCER SCR C-: CPT

## 2023-10-26 RX ORDER — AMLODIPINE BESYLATE AND BENAZEPRIL HYDROCHLORIDE 5; 20 MG/1; MG/1
1 CAPSULE ORAL DAILY
Qty: 100 CAPSULE | Refills: 2 | Status: SHIPPED | OUTPATIENT
Start: 2023-10-26 | End: 2023-10-26 | Stop reason: SDUPTHER

## 2023-10-26 NOTE — TELEPHONE ENCOUNTER
Received request via: Patient    Was the patient seen in the last year in this department? Yes    Does the patient have an active prescription (recently filled or refills available) for medication(s) requested? No    Does the patient have assisted Plus and need 100 day supply (blood pressure, diabetes and cholesterol meds only)? Yes, quantity updated to 100 days

## 2023-10-28 ENCOUNTER — HOSPITAL ENCOUNTER (OUTPATIENT)
Dept: LAB | Facility: MEDICAL CENTER | Age: 68
End: 2023-10-28
Attending: PHYSICIAN ASSISTANT
Payer: MEDICARE

## 2023-10-28 DIAGNOSIS — R73.03 PREDIABETES: ICD-10-CM

## 2023-10-28 LAB
EST. AVERAGE GLUCOSE BLD GHB EST-MCNC: 117 MG/DL
HBA1C MFR BLD: 5.7 % (ref 4–5.6)

## 2023-10-28 PROCEDURE — 83036 HEMOGLOBIN GLYCOSYLATED A1C: CPT

## 2023-10-28 PROCEDURE — 36415 COLL VENOUS BLD VENIPUNCTURE: CPT

## 2023-10-30 ENCOUNTER — TELEPHONE (OUTPATIENT)
Dept: SLEEP MEDICINE | Facility: MEDICAL CENTER | Age: 68
End: 2023-10-30
Payer: MEDICARE

## 2023-10-30 DIAGNOSIS — I10 PRIMARY HYPERTENSION: ICD-10-CM

## 2023-10-30 DIAGNOSIS — E78.5 DYSLIPIDEMIA: ICD-10-CM

## 2023-10-30 DIAGNOSIS — E55.9 VITAMIN D DEFICIENCY: ICD-10-CM

## 2023-10-30 DIAGNOSIS — G47.33 OSA (OBSTRUCTIVE SLEEP APNEA): ICD-10-CM

## 2023-10-30 DIAGNOSIS — R73.03 PREDIABETES: ICD-10-CM

## 2023-10-30 DIAGNOSIS — R53.83 OTHER FATIGUE: ICD-10-CM

## 2023-10-30 PROBLEM — R91.1 LUNG NODULE: Status: ACTIVE | Noted: 2023-10-30

## 2023-10-30 RX ORDER — OMEPRAZOLE 20 MG/1
20 CAPSULE, DELAYED RELEASE ORAL
Qty: 90 CAPSULE | Refills: 3 | Status: SHIPPED | OUTPATIENT
Start: 2023-10-30

## 2023-10-30 RX ORDER — AMLODIPINE BESYLATE AND BENAZEPRIL HYDROCHLORIDE 5; 20 MG/1; MG/1
1 CAPSULE ORAL DAILY
Qty: 100 CAPSULE | Refills: 3 | Status: SHIPPED | OUTPATIENT
Start: 2023-10-30

## 2023-10-30 RX ORDER — BUPROPION HYDROCHLORIDE 150 MG/1
150 TABLET, EXTENDED RELEASE ORAL 2 TIMES DAILY
Qty: 180 TABLET | Refills: 3 | Status: SHIPPED | OUTPATIENT
Start: 2023-10-30

## 2023-10-30 NOTE — TELEPHONE ENCOUNTER
Phoned patient with results of LDCT exam performed on 10/26/23.    Notified him that the results showed very small nodule(s) at bilateral lungs that had a benign (or non cancer) appearance.    To make sure these nodule(s) are benign, and remain unchanged, we recommend a follow-up low-dose chest CT in 12 months, which will be ordered per PCP/referring provider.    Patient informed of incidental findings of emphysematous changes, atherosclerosis of the aorta, calcified right lung granulomas with recommendation to follow-up up with PCP/referring provider.  Patient agrees to all recommendations.    Referring provider ANDREI Mcgee, notified of results and incidental findings per this correspondence.    [facility], [place] updated and patient sent lung cancer screening result letter.        CT-LUNG CANCER-SCREENING    Result Date: 10/26/2023  10/26/2023 8:23 AM HISTORY/REASON FOR EXAM:  Lung Cancer Screening. Tobacco use. 27.5 pack year smoking history. TECHNIQUE/EXAM DESCRIPTION AND NUMBER OF VIEWS: Lung cancer screening without contrast. Low dose noncontrast helical images were obtained of the chest from the lung apices through the costophrenic sulci utilizing thin collimation and intervals with reconstructed images sent to PACS in axial, coronal and sagittal planes. Low dose optimization technique was utilized for this CT exam including automated exposure control and adjustment of the mA and/or kV according to patient size. COMPARISON: None. FINDINGS: Neck Base:  Normal. Lungs:  Moderately advanced emphysematous changes. Calcified granuloma in the right lower lobe. Noncalcified 5 mm subpleural left lower lobe nodule and a right middle lobe pulmonary nodule. Several additional smaller nodules are seen bilaterally. Some mild linear atelectasis/scarring in the left upper lobe and the right lower lobe. There is mild bronchiectasis in right lower lobe. Pleura and Pleural Space:  No pneumothorax or pleural effusion.  Cardiovascular Structures:  Heart and great vessels are normal in size. There are significant coronary artery calcifications. Scattered atherosclerosis within the aorta. Mediastinum:  No lymphadenopathy. Soft Tissues/Musculoskeletal:  Left shoulder arthroplasty. Upper Abdomen:  No significant abnormality     1.  Several bilateral noncalcified pulmonary nodules measuring up to 5 mm 2.  Calcified granulomas in right lung. 3.  Emphysematous changes, moderately advanced in the upper lobes. 4.  Atherosclerosis including prominent coronary artery calcifications. Lung RADS: 2 - Benign Appearance or Behavior Nodules with a very low likelihood of becoming a clinically active cancer due to size or lack of growth Findings: solid nodule(s): less than 6 mm or new less than 4 mm part solid nodule(s): less than 6 mm total diameter on baseline screening non solid nodule(s) (GGN): less than 30 mm OR greater than or equal to 30 mm and unchanged or slowly growing category 3 or 4 nodules unchanged for greater than or equal to 3 months perifissural nodule(s) less than 10 mm Management: Continue annual screening with LDCT in 12 months

## 2023-12-04 RX ORDER — AZELASTINE 1 MG/ML
1 SPRAY, METERED NASAL 2 TIMES DAILY
Qty: 30 ML | Refills: 3 | Status: SHIPPED | OUTPATIENT
Start: 2023-12-04 | End: 2024-03-06

## 2023-12-09 ENCOUNTER — HOSPITAL ENCOUNTER (OUTPATIENT)
Dept: LAB | Facility: MEDICAL CENTER | Age: 68
End: 2023-12-09
Attending: PHYSICIAN ASSISTANT
Payer: MEDICARE

## 2023-12-09 DIAGNOSIS — R53.83 OTHER FATIGUE: ICD-10-CM

## 2023-12-09 DIAGNOSIS — E78.5 DYSLIPIDEMIA: ICD-10-CM

## 2023-12-09 DIAGNOSIS — I10 PRIMARY HYPERTENSION: ICD-10-CM

## 2023-12-09 DIAGNOSIS — R73.03 PREDIABETES: ICD-10-CM

## 2023-12-09 DIAGNOSIS — G47.33 OSA (OBSTRUCTIVE SLEEP APNEA): ICD-10-CM

## 2023-12-09 DIAGNOSIS — E55.9 VITAMIN D DEFICIENCY: ICD-10-CM

## 2023-12-09 LAB
25(OH)D3 SERPL-MCNC: 22 NG/ML (ref 30–100)
ALBUMIN SERPL BCP-MCNC: 4.6 G/DL (ref 3.2–4.9)
ALBUMIN/GLOB SERPL: 1.5 G/DL
ALP SERPL-CCNC: 84 U/L (ref 30–99)
ALT SERPL-CCNC: 31 U/L (ref 2–50)
ANION GAP SERPL CALC-SCNC: 11 MMOL/L (ref 7–16)
AST SERPL-CCNC: 20 U/L (ref 12–45)
BASOPHILS # BLD AUTO: 0.6 % (ref 0–1.8)
BASOPHILS # BLD: 0.05 K/UL (ref 0–0.12)
BILIRUB SERPL-MCNC: 0.5 MG/DL (ref 0.1–1.5)
BUN SERPL-MCNC: 23 MG/DL (ref 8–22)
CALCIUM ALBUM COR SERPL-MCNC: 9.2 MG/DL (ref 8.5–10.5)
CALCIUM SERPL-MCNC: 9.7 MG/DL (ref 8.5–10.5)
CHLORIDE SERPL-SCNC: 109 MMOL/L (ref 96–112)
CHOLEST SERPL-MCNC: 165 MG/DL (ref 100–199)
CO2 SERPL-SCNC: 25 MMOL/L (ref 20–33)
CREAT SERPL-MCNC: 1.08 MG/DL (ref 0.5–1.4)
CREAT UR-MCNC: 162.56 MG/DL
EOSINOPHIL # BLD AUTO: 0.28 K/UL (ref 0–0.51)
EOSINOPHIL NFR BLD: 3.5 % (ref 0–6.9)
ERYTHROCYTE [DISTWIDTH] IN BLOOD BY AUTOMATED COUNT: 42.4 FL (ref 35.9–50)
EST. AVERAGE GLUCOSE BLD GHB EST-MCNC: 123 MG/DL
FASTING STATUS PATIENT QL REPORTED: NORMAL
GFR SERPLBLD CREATININE-BSD FMLA CKD-EPI: 74 ML/MIN/1.73 M 2
GLOBULIN SER CALC-MCNC: 3 G/DL (ref 1.9–3.5)
GLUCOSE SERPL-MCNC: 122 MG/DL (ref 65–99)
HBA1C MFR BLD: 5.9 % (ref 4–5.6)
HCT VFR BLD AUTO: 47 % (ref 42–52)
HDLC SERPL-MCNC: 61 MG/DL
HGB BLD-MCNC: 16 G/DL (ref 14–18)
IMM GRANULOCYTES # BLD AUTO: 0.04 K/UL (ref 0–0.11)
IMM GRANULOCYTES NFR BLD AUTO: 0.5 % (ref 0–0.9)
LDLC SERPL CALC-MCNC: 84 MG/DL
LYMPHOCYTES # BLD AUTO: 2.43 K/UL (ref 1–4.8)
LYMPHOCYTES NFR BLD: 30.4 % (ref 22–41)
MCH RBC QN AUTO: 30.5 PG (ref 27–33)
MCHC RBC AUTO-ENTMCNC: 34 G/DL (ref 32.3–36.5)
MCV RBC AUTO: 89.7 FL (ref 81.4–97.8)
MICROALBUMIN UR-MCNC: 1.9 MG/DL
MICROALBUMIN/CREAT UR: 12 MG/G (ref 0–30)
MONOCYTES # BLD AUTO: 0.82 K/UL (ref 0–0.85)
MONOCYTES NFR BLD AUTO: 10.3 % (ref 0–13.4)
NEUTROPHILS # BLD AUTO: 4.37 K/UL (ref 1.82–7.42)
NEUTROPHILS NFR BLD: 54.7 % (ref 44–72)
NRBC # BLD AUTO: 0 K/UL
NRBC BLD-RTO: 0 /100 WBC (ref 0–0.2)
PLATELET # BLD AUTO: 137 K/UL (ref 164–446)
PMV BLD AUTO: 10.6 FL (ref 9–12.9)
POTASSIUM SERPL-SCNC: 4.4 MMOL/L (ref 3.6–5.5)
PROT SERPL-MCNC: 7.6 G/DL (ref 6–8.2)
RBC # BLD AUTO: 5.24 M/UL (ref 4.7–6.1)
SODIUM SERPL-SCNC: 145 MMOL/L (ref 135–145)
TRIGL SERPL-MCNC: 100 MG/DL (ref 0–149)
TSH SERPL DL<=0.005 MIU/L-ACNC: 2.06 UIU/ML (ref 0.38–5.33)
WBC # BLD AUTO: 8 K/UL (ref 4.8–10.8)

## 2023-12-09 PROCEDURE — 82570 ASSAY OF URINE CREATININE: CPT

## 2023-12-09 PROCEDURE — 85025 COMPLETE CBC W/AUTO DIFF WBC: CPT

## 2023-12-09 PROCEDURE — 84443 ASSAY THYROID STIM HORMONE: CPT

## 2023-12-09 PROCEDURE — 82306 VITAMIN D 25 HYDROXY: CPT

## 2023-12-09 PROCEDURE — 82043 UR ALBUMIN QUANTITATIVE: CPT

## 2023-12-09 PROCEDURE — 36415 COLL VENOUS BLD VENIPUNCTURE: CPT

## 2023-12-09 PROCEDURE — 83036 HEMOGLOBIN GLYCOSYLATED A1C: CPT

## 2023-12-09 PROCEDURE — 80053 COMPREHEN METABOLIC PANEL: CPT

## 2023-12-09 PROCEDURE — 80061 LIPID PANEL: CPT

## 2023-12-11 DIAGNOSIS — I10 PRIMARY HYPERTENSION: ICD-10-CM

## 2023-12-11 DIAGNOSIS — R73.03 PREDIABETES: ICD-10-CM

## 2023-12-11 DIAGNOSIS — Z12.5 ENCOUNTER FOR SCREENING FOR MALIGNANT NEOPLASM OF PROSTATE: ICD-10-CM

## 2023-12-11 DIAGNOSIS — R53.83 OTHER FATIGUE: ICD-10-CM

## 2023-12-11 DIAGNOSIS — D69.6 THROMBOCYTOPENIA (HCC): ICD-10-CM

## 2023-12-11 DIAGNOSIS — E78.5 DYSLIPIDEMIA: ICD-10-CM

## 2023-12-11 DIAGNOSIS — E55.9 VITAMIN D DEFICIENCY: ICD-10-CM

## 2024-01-18 RX ORDER — TERAZOSIN 5 MG/1
5 CAPSULE ORAL NIGHTLY
Qty: 90 CAPSULE | Refills: 3 | Status: SHIPPED | OUTPATIENT
Start: 2024-01-18 | End: 2024-03-28 | Stop reason: SDUPTHER

## 2024-01-26 ENCOUNTER — OFFICE VISIT (OUTPATIENT)
Dept: MEDICAL GROUP | Facility: PHYSICIAN GROUP | Age: 69
End: 2024-01-26
Payer: MEDICARE

## 2024-01-26 VITALS
HEART RATE: 74 BPM | DIASTOLIC BLOOD PRESSURE: 62 MMHG | BODY MASS INDEX: 28.48 KG/M2 | RESPIRATION RATE: 21 BRPM | OXYGEN SATURATION: 93 % | WEIGHT: 203.4 LBS | SYSTOLIC BLOOD PRESSURE: 106 MMHG | TEMPERATURE: 97.6 F | HEIGHT: 71 IN

## 2024-01-26 DIAGNOSIS — K63.5 POLYP OF COLON, UNSPECIFIED PART OF COLON, UNSPECIFIED TYPE: ICD-10-CM

## 2024-01-26 DIAGNOSIS — R39.12 BENIGN PROSTATIC HYPERPLASIA WITH WEAK URINARY STREAM: ICD-10-CM

## 2024-01-26 DIAGNOSIS — D69.6 THROMBOCYTOPENIA (HCC): ICD-10-CM

## 2024-01-26 DIAGNOSIS — J43.8 OTHER EMPHYSEMA (HCC): ICD-10-CM

## 2024-01-26 DIAGNOSIS — G31.9 CEREBRAL ATROPHY (HCC): ICD-10-CM

## 2024-01-26 DIAGNOSIS — I70.0 ATHEROSCLEROSIS OF AORTA (HCC): ICD-10-CM

## 2024-01-26 DIAGNOSIS — Z87.891 FORMER SMOKER: ICD-10-CM

## 2024-01-26 DIAGNOSIS — N40.1 BENIGN PROSTATIC HYPERPLASIA WITH WEAK URINARY STREAM: ICD-10-CM

## 2024-01-26 PROCEDURE — 3074F SYST BP LT 130 MM HG: CPT | Performed by: PHYSICIAN ASSISTANT

## 2024-01-26 PROCEDURE — 3078F DIAST BP <80 MM HG: CPT | Performed by: PHYSICIAN ASSISTANT

## 2024-01-26 PROCEDURE — 99214 OFFICE O/P EST MOD 30 MIN: CPT | Performed by: PHYSICIAN ASSISTANT

## 2024-01-26 RX ORDER — ALFUZOSIN HYDROCHLORIDE 10 MG/1
1 TABLET, EXTENDED RELEASE ORAL
COMMUNITY
Start: 2023-12-09 | End: 2024-01-26

## 2024-01-26 ASSESSMENT — ENCOUNTER SYMPTOMS
SHORTNESS OF BREATH: 0
FEVER: 0
CHILLS: 0

## 2024-01-26 ASSESSMENT — PATIENT HEALTH QUESTIONNAIRE - PHQ9: CLINICAL INTERPRETATION OF PHQ2 SCORE: 0

## 2024-01-26 ASSESSMENT — FIBROSIS 4 INDEX: FIB4 SCORE: 1.84

## 2024-01-26 NOTE — PROGRESS NOTES
"Subjective:     CC:     HPI:   Tristin presents today with the following:    Problem   Colon Polyp    Colonoscopy 1/16/2024:    Recall 3 years: repeat 1/16/2027.     Thrombocytopenia (Hcc)    Chronic, intermittent.    Repeating labs in 6 months.  Component      Latest Ref Rng 12/9/2023   Platelet Count      164 - 446 K/uL 137 (L)       Former Smoker    Quit smoking early 2023 with the help of Wellbutrin.  He continues to use Wellbutrin so that he will not start smoking again.  Has been referred to the lung cancer screening program.     Atherosclerosis of Aorta (Hcc)    Chronic, stable.  Continue atorvastatin 10 mg daily.     Cerebral Atrophy (Hcc)    Chronic, controlled.  Asymptomatic.    Interval history:  Stable. Records review MRI brain (6/17/15) report there is mild generalized atrophy. MRI brain (7/11/22) reports mild generalized volume loss. Patient is able to recall 3/3 words, name 15 animals in one minute ( goal 13) and draw the clock correctly. Patient is able to perform all ADLs/ IADLs independently. Cont f/u with PCP for ongoing monitoring.       Other Emphysema (Hcc)    Chronic, stable.  PFT, 2003, reduction in FEV1/FVC, consistent with mild obstructive pulmonary disease.  Patient quit smoking early 2023.  Patient stopped taking Advair.     Denies SOB.           ROS:  Review of Systems   Constitutional:  Negative for chills and fever.   Respiratory:  Negative for shortness of breath.    Cardiovascular:  Negative for chest pain.       Objective:     Exam:  /62   Pulse 74   Temp 36.4 °C (97.6 °F) (Temporal)   Resp (!) 21   Ht 1.803 m (5' 11\")   Wt 92.3 kg (203 lb 6.4 oz)   SpO2 93%   BMI 28.37 kg/m²  Body mass index is 28.37 kg/m².    Physical Exam  Vitals reviewed.   Constitutional:       General: He is not in acute distress.     Appearance: Normal appearance.   Pulmonary:      Effort: Pulmonary effort is normal.   Neurological:      General: No focal deficit present.      Mental Status: He " is alert.   Psychiatric:         Mood and Affect: Mood normal.         Behavior: Behavior normal.         Judgment: Judgment normal.            Assessment & Plan:     68 y.o. male with the following -     1. Polyp of colon, unspecified part of colon, unspecified type  Chronic, stable.  Repeat colonoscopy in 3 years.    2. Former smoker  Chronic, stable.  Patient continues without smoking for about a year.    3. Thrombocytopenia (HCC)  Chronic, intermittent.  Repeating CBC.    4. Atherosclerosis of aorta (HCC)  Chronic, stable.  Continue atorvastatin 10 mg daily.    5. Other emphysema (HCC)  Chronic, stable.  Continues without inhalers but denies shortness of breath.    6. Cerebral atrophy (HCC)  Chronic, stable.  Incidental finding on MRI 2015.  Asymptomatic.    7. Benign prostatic hyperplasia with weak urinary stream  Chronic, stable.  Patient was recently switched from alfuzosin to terazosin.  He has not yet received the new medication in the mail.  He would like to be switched over to [facility] if possible.    - Referral to Urology      [facility] Form    Last edited 01/26/24 10:35 PST by Michelle Rainey P.A.-C.       Repeat labs this summer.      Healthcare Maintenance:        Return in about 6 months (around 7/26/2024) for lab discussion.    Please note that this dictation was created using voice recognition software. I have made every reasonable attempt to correct obvious errors, but I expect that there are errors of grammar and possibly content that I did not discover before finalizing the note.

## 2024-03-06 RX ORDER — AZELASTINE HYDROCHLORIDE 137 UG/1
1 SPRAY, METERED NASAL 2 TIMES DAILY
Qty: 30 ML | Refills: 2 | Status: SHIPPED | OUTPATIENT
Start: 2024-03-06 | End: 2024-03-21

## 2024-03-21 RX ORDER — AZELASTINE HYDROCHLORIDE 137 UG/1
1 SPRAY, METERED NASAL 2 TIMES DAILY
Qty: 90 ML | Refills: 3 | Status: SHIPPED | OUTPATIENT
Start: 2024-03-21

## 2024-03-28 ENCOUNTER — OFFICE VISIT (OUTPATIENT)
Dept: UROLOGY | Facility: MEDICAL CENTER | Age: 69
End: 2024-03-28
Payer: MEDICARE

## 2024-03-28 VITALS
RESPIRATION RATE: 18 BRPM | BODY MASS INDEX: 28.36 KG/M2 | HEART RATE: 67 BPM | OXYGEN SATURATION: 97 % | SYSTOLIC BLOOD PRESSURE: 135 MMHG | HEIGHT: 70 IN | WEIGHT: 198.1 LBS | DIASTOLIC BLOOD PRESSURE: 68 MMHG

## 2024-03-28 DIAGNOSIS — R39.12 BENIGN PROSTATIC HYPERPLASIA WITH WEAK URINARY STREAM: ICD-10-CM

## 2024-03-28 DIAGNOSIS — N40.1 BENIGN PROSTATIC HYPERPLASIA WITH WEAK URINARY STREAM: ICD-10-CM

## 2024-03-28 DIAGNOSIS — N52.9 ERECTILE DYSFUNCTION, UNSPECIFIED ERECTILE DYSFUNCTION TYPE: ICD-10-CM

## 2024-03-28 LAB
POC POST-VOID: NORMAL
POC PRE-VOID: 68 ML

## 2024-03-28 RX ORDER — TERAZOSIN 5 MG/1
10 CAPSULE ORAL NIGHTLY
Qty: 30 CAPSULE | Refills: 3 | Status: SHIPPED | OUTPATIENT
Start: 2024-03-28

## 2024-03-28 RX ORDER — TADALAFIL 5 MG/1
5 TABLET ORAL DAILY
Qty: 30 TABLET | Refills: 3 | Status: SHIPPED | OUTPATIENT
Start: 2024-03-28

## 2024-03-28 ASSESSMENT — FIBROSIS 4 INDEX: FIB4 SCORE: 1.84

## 2024-03-28 NOTE — PROGRESS NOTES
Subjective  Tristin Moscoso is a 68 y.o. male who presents today for evaluation of BPH with LUTS.    PSA was 1.31 on 2023    He was seeing [facility] Tan and was upset that he was seeing a different provider every time he went to the clinic so has transferred to [facility] urology.    He is currently taking finasteride (started taking 4 years ago) and terazosin.  He was started on the finasteride for years ago when his PSA was 0.87    Patient reports dribbling, he had a UTI and was treated with antibiotic three years ago, urgency with leakage at times.  He reports his most bothersome symptoms are urgency with leakage.    Patient denies hematuria, dysuria, sensation of incomplete emptying, nocturia 1-2 times per night, frequency, and difficulty starting stream.    BM every 1-2 days. Soft    ED: difficulty maintaining erections, at times ejaculates too soon    Family History   Problem Relation Age of Onset    Seizures Mother     Cancer Sister         eldest sister-lung/chest       Social History     Socioeconomic History    Marital status: Single     Spouse name: Not on file    Number of children: Not on file    Years of education: Not on file    Highest education level: GED or equivalent   Occupational History    Not on file   Tobacco Use    Smoking status: Former     Current packs/day: 0.00     Average packs/day: 0.5 packs/day for 54.9 years (27.4 ttl pk-yrs)     Types: Cigarettes, Cigars     Start date: 1968     Quit date: 2023     Years since quittin.8    Smokeless tobacco: Never   Vaping Use    Vaping Use: Never used   Substance and Sexual Activity    Alcohol use: Never    Drug use: Never    Sexual activity: Not Currently     Partners: Female   Other Topics Concern    Not on file   Social History Narrative    RETIRED -   at [employer] [facility] [employer].  .     Social Determinants of Health     Financial Resource Strain: Low Risk  (8/10/2023)    Overall Financial Resource Strain  (CARDIA)     Difficulty of Paying Living Expenses: Not hard at all   Food Insecurity: Food Insecurity Present (8/10/2023)    Hunger Vital Sign     Worried About Running Out of Food in the Last Year: Sometimes true     Ran Out of Food in the Last Year: Patient declined   Transportation Needs: No Transportation Needs (8/10/2023)    PRAPARE - Transportation     Lack of Transportation (Medical): No     Lack of Transportation (Non-Medical): No   Physical Activity: Insufficiently Active (8/10/2023)    Exercise Vital Sign     Days of Exercise per Week: 3 days     Minutes of Exercise per Session: 30 min   Stress: No Stress Concern Present (8/10/2023)    [place] [employer] - Occupational Stress Questionnaire     Feeling of Stress : Only a little   Social Connections: Unknown (8/10/2023)    Social Connection and Isolation Panel [NHANES]     Frequency of Communication with Friends and Family: More than three times a week     Frequency of Social Gatherings with Friends and Family: More than three times a week     Attends Pentecostalism Services: Not on file     Active Member of Clubs or Organizations: Patient declined     Attends Club or Organization Meetings: More than 4 times per year     Marital Status: Living with partner   Intimate Partner Violence: Not on file   Housing Stability: Unknown (8/10/2023)    Housing Stability Vital Sign     Unable to Pay for Housing in the Last Year: No     Number of Places Lived in the Last Year: Not on file     Unstable Housing in the Last Year: No       Past Surgical History:   Procedure Laterality Date    SHOULDER ARTHROPLASTY TOTAL Left 01/02/2019    Procedure: SHOULDER ARTHROPLASTY TOTAL - REVERSE W/LATISSIMUS TRANSFER;  Surgeon: Marco Antonio Morel M.D.;  Location: SURGERY SAME DAY [facility];  Service: Orthopedics    VITRECTOMY POSTERIOR Right 06/04/2018    Procedure: VITRECTOMY POSTERIOR-  ENDO LASER, AIR FLUID EXCHANGE, SF6 INTRAOCULAR GAS;  Surgeon: Rolf ZAMORA  "EJ Bee;  Location: SURGERY SAME DAY [facility];  Service: Ophthalmology    KNEE ARTHROSCOPY  08/28/2012    Performed by ERIK MUHAMMAD at SURGERY SAME DAY [facility]    MENISCECTOMY, KNEE, MEDIAL  08/28/2012    Performed by ERIK MUHAMMAD at SURGERY SAME DAY [facility]    OTHER  01/01/1983    kidney stones    ABDOMINAL EXPLORATION      APPENDECTOMY      EYE SURGERY      cataract bilateral-had lens    OTHER      lense implants 6-7 years       Past Medical History:   Diagnosis Date    Allergy     Blood transfusion without reported diagnosis 11 yoa    during surgery/  In [place]    Cataract     s/p surgery christi    Dental disorder     full upper denture     Heart burn     High cholesterol     Hyperlipidemia     Kidney disease     kidney stone at 10 yoa    Other emphysema (HCC) 01/11/2022    Snoring     Stroke (HCC) 06/2013    see ER records; anant consult after. presented with left sided weakness. takes 81mg daily       Current Outpatient Medications   Medication Sig Dispense Refill    Azelastine (ASTELIN) 137 MCG/SPRAY Solution ADMINISTER 1 SPRAY INTO AFFECTED NOSTRIL(S) 2 TIMES A DAY. 90 mL 3    terazosin (HYTRIN) 5 MG Cap Take 1 Capsule by mouth every evening. MAY TAKE 2 CAPS IF 1 ISN\"T WORKING 90 Capsule 3    buPROPion SR (WELLBUTRIN-SR) 150 MG TABLET SR 12 HR sustained-release tablet Take 1 Tablet by mouth 2 times a day. 180 Tablet 3    amlodipine-benazepril (LOTREL) 5-20 MG per capsule Take 1 Capsule by mouth every day. 100 Capsule 3    omeprazole (PRILOSEC) 20 MG delayed-release capsule Take 1 Capsule by mouth every day. 90 Capsule 3    benzonatate (TESSALON) 100 MG Cap TAKE 1 TO 2 CAPSULES BY MOUTH 3 TIMES A DAY AS NEEDED FOR COUGH 100 Capsule 0    atorvastatin (LIPITOR) 10 MG Tab TAKE 1 TABLET BY MOUTH EVERY  Tablet 3    aspirin EC (ECOTRIN) 81 MG Tablet Delayed Response Take 1 Tablet by mouth every day.      finasteride (PROSCAR) 5 MG Tab Take 1 Tablet by mouth at bedtime.  1     No " "current facility-administered medications for this visit.       Allergies   Allergen Reactions    Kiwi Extract      Tongue itching     Penicillins Hives and Itching    Sulfa Drugs      Dizzy,tongue swells       Objective  /68 (BP Location: Left arm, Patient Position: Sitting, BP Cuff Size: Adult)   Pulse 67   Resp 18   Ht 1.778 m (5' 10\")   Wt 89.9 kg (198 lb 1.6 oz)   SpO2 97%   Physical Exam  Constitutional:       Appearance: Normal appearance.   Pulmonary:      Effort: Pulmonary effort is normal.      Breath sounds: Normal breath sounds.   Genitourinary:     Comments: No urinary symptoms at this time  Skin:     General: Skin is warm and dry.   Neurological:      Mental Status: He is alert.         Labs:   PSA   Lab Results   Component Value Date/Time    PSATOTAL 1.31 06/09/2023 0820       Imaging:     Assessment  For patient BPH with lower urinary tract symptoms, I have increased terazosin to 10 mg p.o. daily.  Patient may take this medication nightly.    PSA was 1.31 so calculated value was roughly around 3.  I have discontinued finasteride and started patient on tadalafil 5 mg p.o. daily.  I am hoping that this medication will assist with his erectile dysfunction, along with BPH with lower urinary tract symptoms.    For urgency with leakage, I am hoping the tadalafil and increased dose of the alpha-blocker will decrease this urgency with leakage, however likely will need to start patient on an anticholinergic.  Patient wishes not to add another medication at this time and possibly increase medications that he is currently taking.  Plan    Problem List Items Addressed This Visit       BPH (benign prostatic hyperplasia)    Relevant Orders    POCT Bladder Scan (Completed)       "

## 2024-03-28 NOTE — ASSESSMENT & PLAN NOTE
Start tadalafil 5 mg p.o. daily.    Increase terazosin to 10 mg p.o. daily.    Return to clinic in 6 weeks

## 2024-05-06 ENCOUNTER — TELEPHONE (OUTPATIENT)
Dept: HEALTH INFORMATION MANAGEMENT | Facility: OTHER | Age: 69
End: 2024-05-06
Payer: MEDICARE

## 2024-05-09 ENCOUNTER — OFFICE VISIT (OUTPATIENT)
Dept: UROLOGY | Facility: MEDICAL CENTER | Age: 69
End: 2024-05-09
Payer: MEDICARE

## 2024-05-09 VITALS
HEART RATE: 87 BPM | HEIGHT: 70 IN | DIASTOLIC BLOOD PRESSURE: 68 MMHG | OXYGEN SATURATION: 98 % | BODY MASS INDEX: 28.35 KG/M2 | TEMPERATURE: 98 F | SYSTOLIC BLOOD PRESSURE: 119 MMHG | WEIGHT: 198 LBS

## 2024-05-09 DIAGNOSIS — R39.12 BENIGN PROSTATIC HYPERPLASIA WITH WEAK URINARY STREAM: ICD-10-CM

## 2024-05-09 DIAGNOSIS — N40.1 BENIGN PROSTATIC HYPERPLASIA WITH WEAK URINARY STREAM: ICD-10-CM

## 2024-05-09 LAB
POC POST-VOID: NORMAL
POC PRE-VOID: 100 ML

## 2024-05-09 PROCEDURE — 3078F DIAST BP <80 MM HG: CPT

## 2024-05-09 PROCEDURE — 3074F SYST BP LT 130 MM HG: CPT

## 2024-05-09 PROCEDURE — 51798 US URINE CAPACITY MEASURE: CPT

## 2024-05-09 PROCEDURE — 99214 OFFICE O/P EST MOD 30 MIN: CPT | Mod: 25

## 2024-05-09 ASSESSMENT — FIBROSIS 4 INDEX: FIB4 SCORE: 1.84

## 2024-05-09 NOTE — PROGRESS NOTES
Subjective  Tristin Moscoso is a 68 y.o. male who presents today for follow-up of BPH with lower urinary tract symptoms.      At last visit, we increased his terazosin to 10 mg p.o. daily, and started him on tadalafil 5 mg p.o. daily.  At that time we also stopped the finasteride.    He reports he is still having dribbling, and intermittent weak and strong stream.  He is reporting that his urgency with leakage is better, he is able to wait a little bit longer when he feels the urge to go to the bathroom and now he is able to make it but at times still has some leakage.    We have discussed continuing medications versus surgical interventions, and patient wishes to proceed with transrectal ultrasound and cystoscopy to determine if he would be a good candidate for surgical intervention    Family History   Problem Relation Age of Onset    Seizures Mother     Cancer Sister         eldest sister-lung/chest       Social History     Socioeconomic History    Marital status: Single     Spouse name: Not on file    Number of children: Not on file    Years of education: Not on file    Highest education level: GED or equivalent   Occupational History    Not on file   Tobacco Use    Smoking status: Former     Current packs/day: 0.00     Average packs/day: 0.5 packs/day for 54.9 years (27.4 ttl pk-yrs)     Types: Cigarettes, Cigars     Start date: 1968     Quit date: 2023     Years since quittin.9    Smokeless tobacco: Never   Vaping Use    Vaping Use: Never used   Substance and Sexual Activity    Alcohol use: Never    Drug use: Never    Sexual activity: Not Currently     Partners: Female   Other Topics Concern    Not on file   Social History Narrative    RETIRED -   at [employer].  .     Social Determinants of Health     Financial Resource Strain: Low Risk  (8/10/2023)    Overall Financial Resource Strain (CARDIA)     Difficulty of Paying Living Expenses: Not hard at all   Food  Insecurity: Food Insecurity Present (8/10/2023)    Hunger Vital Sign     Worried About Running Out of Food in the Last Year: Sometimes true     Ran Out of Food in the Last Year: Patient declined   Transportation Needs: No Transportation Needs (8/10/2023)    PRAPARE - Transportation     Lack of Transportation (Medical): No     Lack of Transportation (Non-Medical): No   Physical Activity: Insufficiently Active (8/10/2023)    Exercise Vital Sign     Days of Exercise per Week: 3 days     Minutes of Exercise per Session: 30 min   Stress: No Stress Concern Present (8/10/2023)    [employer] [employer] - Occupational Stress Questionnaire     Feeling of Stress : Only a little   Social Connections: Unknown (8/10/2023)    Social Connection and Isolation Panel [NHANES]     Frequency of Communication with Friends and Family: More than three times a week     Frequency of Social Gatherings with Friends and Family: More than three times a week     Attends Jainism Services: Not on file     Active Member of Clubs or Organizations: Patient declined     Attends Club or Organization Meetings: More than 4 times per year     Marital Status: Living with partner   Intimate Partner Violence: Not on file   Housing Stability: Unknown (8/10/2023)    Housing Stability Vital Sign     Unable to Pay for Housing in the Last Year: No     Number of Places Lived in the Last Year: Not on file     Unstable Housing in the Last Year: No       Past Surgical History:   Procedure Laterality Date    SHOULDER ARTHROPLASTY TOTAL Left 01/02/2019    Procedure: SHOULDER ARTHROPLASTY TOTAL - REVERSE W/LATISSIMUS TRANSFER;  Surgeon: Marco Antonio Morel M.D.;  Location: SURGERY SAME DAY [facility];  Service: Orthopedics    VITRECTOMY POSTERIOR Right 06/04/2018    Procedure: VITRECTOMY POSTERIOR-  ENDO LASER, AIR FLUID EXCHANGE, SF6 INTRAOCULAR GAS;  Surgeon: Rolf Bee M.D.;  Location: SURGERY SAME DAY [facility];  Service:  Ophthalmology    KNEE ARTHROSCOPY  08/28/2012    Performed by ERIK MUHAMMAD at SURGERY SAME DAY [facility]    MENISCECTOMY, KNEE, MEDIAL  08/28/2012    Performed by ERIK MUHAMMAD at SURGERY SAME DAY [facility]    OTHER  01/01/1983    kidney stones    ABDOMINAL EXPLORATION      APPENDECTOMY      EYE SURGERY      cataract bilateral-had lens    OTHER      lense implants 6-7 years       Past Medical History:   Diagnosis Date    Allergy     Blood transfusion without reported diagnosis 11 yoa    during surgery/  In [place]    Cataract     s/p surgery christi    Dental disorder     full upper denture     Heart burn     High cholesterol     Hyperlipidemia     Kidney disease     kidney stone at 10 yoa    Other emphysema (HCC) 01/11/2022    Snoring     Stroke ([facility]) 06/2013    see ER records; anant consult after. presented with left sided weakness. takes 81mg daily       Current Outpatient Medications   Medication Sig Dispense Refill    terazosin (HYTRIN) 5 MG Cap Take 2 Capsules by mouth every evening. 30 Capsule 3    tadalafil (CIALIS) 5 MG tablet Take 1 Tablet by mouth every day. 30 Tablet 3    Azelastine (ASTELIN) 137 MCG/SPRAY Solution ADMINISTER 1 SPRAY INTO AFFECTED NOSTRIL(S) 2 TIMES A DAY. 90 mL 3    buPROPion SR (WELLBUTRIN-SR) 150 MG TABLET SR 12 HR sustained-release tablet Take 1 Tablet by mouth 2 times a day. 180 Tablet 3    amlodipine-benazepril (LOTREL) 5-20 MG per capsule Take 1 Capsule by mouth every day. 100 Capsule 3    omeprazole (PRILOSEC) 20 MG delayed-release capsule Take 1 Capsule by mouth every day. 90 Capsule 3    benzonatate (TESSALON) 100 MG Cap TAKE 1 TO 2 CAPSULES BY MOUTH 3 TIMES A DAY AS NEEDED FOR COUGH 100 Capsule 0    atorvastatin (LIPITOR) 10 MG Tab TAKE 1 TABLET BY MOUTH EVERY  Tablet 3    aspirin EC (ECOTRIN) 81 MG Tablet Delayed Response Take 1 Tablet by mouth every day.       No current facility-administered medications for this visit.       Allergies   Allergen Reactions     "Kiwi Extract      Tongue itching     Penicillins Hives and Itching    Sulfa Drugs      Dizzy,tongue swells       Objective  /68 (BP Location: Right arm, Patient Position: Sitting, BP Cuff Size: Adult)   Pulse 87   Temp 36.7 °C (98 °F) (Temporal)   Ht 1.778 m (5' 10\")   Wt 89.8 kg (198 lb)   SpO2 98%   Physical Exam  Constitutional:       Appearance: Normal appearance.   HENT:      Head: Normocephalic and atraumatic.   Eyes:      Extraocular Movements: Extraocular movements intact.   Pulmonary:      Effort: Pulmonary effort is normal.   Genitourinary:     Comments: No urinary symptoms at this time  Skin:     General: Skin is warm and dry.   Neurological:      Mental Status: He is alert.   Psychiatric:         Mood and Affect: Mood normal.         Behavior: Behavior normal.         Labs:   PSA   Lab Results   Component Value Date/Time    PSATOTAL 1.31 06/09/2023 0820       Imaging:   none    Assessment    At this time, we will continue tadalafil, and terazosin.  I have recommended patient meets with one of our surgeons to have a cystoscopy, and transrectal ultrasound to determine if patient would be a good surgical candidate.  Patient is interested in discontinuing medication if surgical intervention will lead to this.    The medications have made a difference for his urologic symptoms, however patient wishes to find out his options.    Return to clinic in 6 months for medication follow-up with myself,    Return to clinic as soon as possible for transrectal ultrasound and cystoscopy    Plan    Problem List Items Addressed This Visit       BPH (benign prostatic hyperplasia)     Continue terazosin, and tadalafil    Schedule a cystoscopy, and transrectal ultrasound         Relevant Orders    POCT Bladder Scan (Completed)       "

## 2024-05-23 NOTE — ASSESSMENT & PLAN NOTE
Chronic, stable. Reviewed lipid profile from December 2023. Currently taking atorvastatin 10 mg daily. Reports that he walks at the [employer] or goes to [place] a few times a week. Denies chest pain and claudication.

## 2024-05-23 NOTE — ASSESSMENT & PLAN NOTE
Chronic, stable. Reviewed MRI of the brain from July 2022. Denies visual disturbances, loss of coordination, and changes in mentation.

## 2024-05-23 NOTE — ASSESSMENT & PLAN NOTE
BMI is 25.54 kg/m2. Provided education on heart healthy diet with adequate intake of fruits, vegetables, and whole grains. Encourage 30 minutes of moderate exercise 3-4 times a week.

## 2024-05-23 NOTE — ASSESSMENT & PLAN NOTE
Chronic, stable. Currently taking amlodipine-benazepril 5-20 mg daily. In-office blood pressure is 118/62. Denies chest pain, lightheadedness, and lower extremity edema.

## 2024-05-23 NOTE — ASSESSMENT & PLAN NOTE
Chronic, stable. Reviewed CT lung cancer screening from October 2023. Currently taking aspirin 81 mg daily. Denies chest pain and weakness of extremities.

## 2024-05-23 NOTE — ASSESSMENT & PLAN NOTE
Chronic, stable. Currently taking terazosin 10 mg daily. Denies obstructive symptoms, dysuria, and flank pain.

## 2024-05-23 NOTE — ASSESSMENT & PLAN NOTE
Chronic, stable. Most recent hemoglobin A1C was 5.9 in December 2023. No current medication use, diet-controlled. Routinely monitored by primary care provider.

## 2024-05-23 NOTE — ASSESSMENT & PLAN NOTE
Chronic, stable. Reviewed pulmonary function test from 2010. No current medication use. Denies cough and shortness of breath.

## 2024-05-25 DIAGNOSIS — K21.9 GASTROESOPHAGEAL REFLUX DISEASE WITHOUT ESOPHAGITIS: ICD-10-CM

## 2024-05-28 ENCOUNTER — OFFICE VISIT (OUTPATIENT)
Dept: FAMILY PLANNING/WOMEN'S HEALTH CLINIC | Facility: PHYSICIAN GROUP | Age: 69
End: 2024-05-28
Payer: MEDICARE

## 2024-05-28 VITALS
DIASTOLIC BLOOD PRESSURE: 62 MMHG | BODY MASS INDEX: 25.48 KG/M2 | WEIGHT: 178 LBS | HEIGHT: 70 IN | SYSTOLIC BLOOD PRESSURE: 118 MMHG

## 2024-05-28 DIAGNOSIS — G31.9 CEREBRAL ATROPHY (HCC): ICD-10-CM

## 2024-05-28 DIAGNOSIS — R73.03 PREDIABETES: ICD-10-CM

## 2024-05-28 DIAGNOSIS — J43.8 OTHER EMPHYSEMA (HCC): ICD-10-CM

## 2024-05-28 DIAGNOSIS — R39.12 BENIGN PROSTATIC HYPERPLASIA WITH WEAK URINARY STREAM: ICD-10-CM

## 2024-05-28 DIAGNOSIS — I10 PRIMARY HYPERTENSION: ICD-10-CM

## 2024-05-28 DIAGNOSIS — E78.5 DYSLIPIDEMIA: ICD-10-CM

## 2024-05-28 DIAGNOSIS — N40.1 BENIGN PROSTATIC HYPERPLASIA WITH WEAK URINARY STREAM: ICD-10-CM

## 2024-05-28 DIAGNOSIS — I70.0 ATHEROSCLEROSIS OF AORTA (HCC): ICD-10-CM

## 2024-05-28 DIAGNOSIS — K21.9 GASTROESOPHAGEAL REFLUX DISEASE WITHOUT ESOPHAGITIS: ICD-10-CM

## 2024-05-28 PROCEDURE — 1125F AMNT PAIN NOTED PAIN PRSNT: CPT

## 2024-05-28 PROCEDURE — G0439 PPPS, SUBSEQ VISIT: HCPCS

## 2024-05-28 PROCEDURE — 3074F SYST BP LT 130 MM HG: CPT

## 2024-05-28 PROCEDURE — 3078F DIAST BP <80 MM HG: CPT

## 2024-05-28 SDOH — ECONOMIC STABILITY: FOOD INSECURITY: WITHIN THE PAST 12 MONTHS, YOU WORRIED THAT YOUR FOOD WOULD RUN OUT BEFORE YOU GOT MONEY TO BUY MORE.: NEVER TRUE

## 2024-05-28 SDOH — ECONOMIC STABILITY: TRANSPORTATION INSECURITY
IN THE PAST 12 MONTHS, HAS THE LACK OF TRANSPORTATION KEPT YOU FROM MEDICAL APPOINTMENTS OR FROM GETTING MEDICATIONS?: YES

## 2024-05-28 SDOH — ECONOMIC STABILITY: TRANSPORTATION INSECURITY
IN THE PAST 12 MONTHS, HAS LACK OF TRANSPORTATION KEPT YOU FROM MEETINGS, WORK, OR FROM GETTING THINGS NEEDED FOR DAILY LIVING?: YES

## 2024-05-28 SDOH — ECONOMIC STABILITY: FOOD INSECURITY: WITHIN THE PAST 12 MONTHS, THE FOOD YOU BOUGHT JUST DIDN'T LAST AND YOU DIDN'T HAVE MONEY TO GET MORE.: NEVER TRUE

## 2024-05-28 SDOH — ECONOMIC STABILITY: INCOME INSECURITY: HOW HARD IS IT FOR YOU TO PAY FOR THE VERY BASICS LIKE FOOD, HOUSING, MEDICAL CARE, AND HEATING?: NOT HARD AT ALL

## 2024-05-28 ASSESSMENT — ACTIVITIES OF DAILY LIVING (ADL): BATHING_REQUIRES_ASSISTANCE: 0

## 2024-05-28 ASSESSMENT — PAIN SCALES - GENERAL: PAINLEVEL: 2=MINIMAL-SLIGHT

## 2024-05-28 ASSESSMENT — ENCOUNTER SYMPTOMS: GENERAL WELL-BEING: GOOD

## 2024-05-28 ASSESSMENT — FIBROSIS 4 INDEX: FIB4 SCORE: 1.84

## 2024-05-28 ASSESSMENT — PATIENT HEALTH QUESTIONNAIRE - PHQ9: CLINICAL INTERPRETATION OF PHQ2 SCORE: 0

## 2024-05-28 NOTE — TELEPHONE ENCOUNTER
Received request via: Patient    Was the patient seen in the last year in this department? Yes    Does the patient have an active prescription (recently filled or refills available) for medication(s) requested? No    [facility]    Does the patient have shelter Plus and need 100 day supply (blood pressure, diabetes and cholesterol meds only)? Yes, quantity updated to 100 days

## 2024-05-28 NOTE — PROGRESS NOTES
Comprehensive Health Assessment Program     Tristin Moscoso is a 68 y.o. here for his comprehensive health assessment.    Patient Active Problem List    Diagnosis Date Noted    Erectile dysfunction 03/28/2024    Colon polyp 01/26/2024    Thrombocytopenia (HCC) 12/11/2023    Lung nodule 10/30/2023    Dyslipidemia 04/27/2023    Prediabetes 04/27/2023    Essential tremor 02/08/2023    Former smoker 02/03/2023    Atherosclerosis of aorta (HCC) 02/03/2023    Cerebral atrophy (HCC) 01/11/2022    Other emphysema (HCC) 01/11/2022    BMI 25.0-25.9,adult 01/11/2022    Chronic cough 01/04/2021    Primary hypertension 11/02/2020    Vitamin D deficiency 12/15/2015    BPH (benign prostatic hyperplasia) 12/15/2015    Gastroesophageal reflux disease without esophagitis 12/15/2015    History of positive PPD 09/17/2014    PETER (obstructive sleep apnea) 06/17/2013    History of CVA (cerebrovascular accident) without residual deficits 06/16/2013       Current Outpatient Medications   Medication Sig Dispense Refill    terazosin (HYTRIN) 5 MG Cap Take 2 Capsules by mouth every evening. 30 Capsule 3    tadalafil (CIALIS) 5 MG tablet Take 1 Tablet by mouth every day. 30 Tablet 3    Azelastine (ASTELIN) 137 MCG/SPRAY Solution ADMINISTER 1 SPRAY INTO AFFECTED NOSTRIL(S) 2 TIMES A DAY. 90 mL 3    buPROPion SR (WELLBUTRIN-SR) 150 MG TABLET SR 12 HR sustained-release tablet Take 1 Tablet by mouth 2 times a day. 180 Tablet 3    amlodipine-benazepril (LOTREL) 5-20 MG per capsule Take 1 Capsule by mouth every day. 100 Capsule 3    omeprazole (PRILOSEC) 20 MG delayed-release capsule Take 1 Capsule by mouth every day. 90 Capsule 3    benzonatate (TESSALON) 100 MG Cap TAKE 1 TO 2 CAPSULES BY MOUTH 3 TIMES A DAY AS NEEDED FOR COUGH 100 Capsule 0    atorvastatin (LIPITOR) 10 MG Tab TAKE 1 TABLET BY MOUTH EVERY  Tablet 3    aspirin EC (ECOTRIN) 81 MG Tablet Delayed Response Take 1 Tablet by mouth every day.       No current  facility-administered medications for this visit.          Current supplements as per medication list.     Allergies:   Kiwi extract, Penicillins, and Sulfa drugs  Social History     Tobacco Use    Smoking status: Former     Current packs/day: 0.00     Average packs/day: 0.5 packs/day for 54.9 years (27.4 ttl pk-yrs)     Types: Cigarettes, Cigars     Start date: 1968     Quit date: 2023     Years since quittin.0    Smokeless tobacco: Never   Vaping Use    Vaping status: Never Used   Substance Use Topics    Alcohol use: Never    Drug use: Never     Family History   Problem Relation Age of Onset    Seizures Mother     Cancer Sister         eldest sister-lung/chest     Tristin  has a past medical history of Allergy, Blood transfusion without reported diagnosis (11 yoa), Cataract, Dental disorder, Heart burn, High cholesterol, Hyperlipidemia, Kidney disease, Other emphysema (HCC) (2022), Snoring, and Stroke (HCC) (2013).    He has no past medical history of Clotting disorder ([facility]) or Diabetic neuropathy ([facility]).   Past Surgical History:   Procedure Laterality Date    SHOULDER ARTHROPLASTY TOTAL Left 2019    Procedure: SHOULDER ARTHROPLASTY TOTAL - REVERSE W/LATISSIMUS TRANSFER;  Surgeon: Marco Antonio Morel M.D.;  Location: SURGERY SAME DAY [facility];  Service: Orthopedics    VITRECTOMY POSTERIOR Right 2018    Procedure: VITRECTOMY POSTERIOR-  ENDO LASER, AIR FLUID EXCHANGE, SF6 INTRAOCULAR GAS;  Surgeon: Rolf Bee M.D.;  Location: SURGERY SAME DAY [facility];  Service: Ophthalmology    KNEE ARTHROSCOPY  2012    Performed by ERIK MUHAMMAD at SURGERY SAME DAY [facility]    MENISCECTOMY, KNEE, MEDIAL  2012    Performed by ERIK MUHAMMAD at SURGERY SAME DAY [facility]    OTHER  1983    kidney stones    ABDOMINAL EXPLORATION      APPENDECTOMY      EYE SURGERY      cataract bilateral-had lens    OTHER      lense implants 6-7 years       Screening:  In  the last six months have you experienced any leakage of urine? Yes    Depression Screening  Little interest or pleasure in doing things?  0 - not at all  Feeling down, depressed , or hopeless? 0 - not at all  Patient Health Questionnaire Score:  0    If depressive symptoms identified deferred to follow up visit unless specifically addressed in assessment and plan.    Interpretation of PHQ-9 Total Score   Score Severity   1-4 No Depression   5-9 Mild Depression   10-14 Moderate Depression   15-19 Moderately Severe Depression   20-27 Severe Depression    Screening for Cognitive Impairment  Do you or any of your friends or family members have any concern about your memory? Yes  Three Minute Recall (Leader, Season, Table) 0/3    Mauricio clock face with all 12 numbers and set the hands to show 10 minutes after 11.  Yes    Cognitive concerns identified deferred for follow up unless specifically addressed in assessment and plan.    Fall Risk Assessment  Has the patient had two or more falls in the last year or any fall with injury in the last year?  No    Safety Assessment  Do you always wear your seatbelt?  Yes  Any changes to home needed to function safely? No  Difficulty hearing.  No  Patient counseled about all safety risks that were identified.    Functional Assessment ADLs  Are there any barriers preventing you from cooking for yourself or meeting nutritional needs?  No.    Are there any barriers preventing you from driving safely or obtaining transportation?  No.    Are there any barriers preventing you from using a telephone or calling for help?  No    Are there any barriers preventing you from shopping?  No.    Are there any barriers preventing you from taking care of your own finances?  No    Are there any barriers preventing you from managing your medications?  No    Are there any barriers preventing you from showering, bathing or dressing yourself? No    Are there any barriers preventing you from doing housework  or laundry? No    Are there any barriers preventing you from using the toilet?No    Are you currently engaging in any exercise or physical activity?  No.      Self-Assessment of Health  What is your perception of your health? Good    Do you sleep more than six hours a night? No    In the past 7 days, how much did pain keep you from doing your normal work? Some Left knee pain.  Do you spend quality time with family or friends (virtually or in person)? Yes    Do you usually eat a heart healthy diet that constists of a variety of fruits, vegetables, whole grains and fiber? Yes Not much per patient.  Do you eat foods high in fat and/or Fast Food more than three times per week? Yes    How concerned are you that your medical conditions are not being well managed? Not at all    Are you worried that in the next 2 months, you may not have stable housing that you own, rent, or stay in as part of a household? No        Advance Care Planning  Do you have an Advance Directive, Living Will, Durable Power of , or POLST? No  Provided patient with educational brochure regarding Advance Care Planning.                  Health Maintenance Summary            Overdue - Hepatitis A Vaccine (Hep A) (1 of 2 - Risk 2-dose series) Never done      No completion history exists for this topic.              Overdue - Annual Pulmonary Function Test / Spirometry (Yearly) Overdue since 2/8/2011 02/08/2010  PFT DICTATED RESULTS              Overdue - Zoster (Shingles) Vaccines (2 of 2) Overdue since 11/19/2023 09/24/2023  Imm Admin: Zoster Vaccine Recombinant (RZV) (SHINGRIX)              Lung Cancer Screening (Yearly) Next due on 10/25/2024      10/26/2023  CT-LUNG CANCER-SCREENING              Annual Wellness Visit (Yearly) Next due on 5/28/2025 05/28/2024  Done - Comprehensive Health Assessment    05/28/2024  Level of Service: NE ANNUAL WELLNESS VISIT-INCLUDES PPPS SUBSEQUE*    08/11/2023  Visit Dx: Medicare annual wellness  visit, subsequent    08/11/2023  Level of Service: UT ANNUAL WELLNESS VISIT-INCLUDES PPPS SUBSEQUE*    02/03/2023  Level of Service: UT ANNUAL WELLNESS VISIT-INCLUDES PPPS SUBSEQUE*    Only the first 5 history entries have been loaded, but more history exists.              Colorectal Cancer Screening (Colonoscopy - Every 3 Years) Next due on 1/16/2027 01/16/2024  COLONOSCOPY RESULTS    02/11/2021  REFERRAL TO GI FOR COLONOSCOPY    04/30/2014  OCCULT BLOOD FECES IMMUNOASSAY    01/01/2013  Colonoscopy (Done - polyps-non-cancerous.  [facility])              IMM DTaP/Tdap/Td Vaccine (2 - Td or Tdap) Next due on 10/6/2033      10/06/2023  Imm Admin: Tdap Vaccine    06/15/2009  Imm Admin: TD Vaccine              Hepatitis C Screening  Completed      04/06/2015  Hepatitis C Antibody component of HEPATITIS PANEL ACUTE(4 COMPONENTS)              Abdominal Aortic Aneurysm (AAA) Screening  Completed      04/19/2021  Outside Procedure: [facility] US, RETROPERITNL ABD,  LTD    08/27/2020  Outside Procedure: [facility] US, RETROPERITNL ABD,  LTD    04/06/2015  US-ABDOMEN COMPLETE SURVEY    11/03/2014  CT-ABDOMEN-PELVIS WITH              Influenza Vaccine (Series Information) Completed      09/21/2023  Imm Admin: Influenza Vaccine, Quadrivalent, Adjuvanted (Pf)    10/07/2022  Imm Admin: Influenza Vaccine Adult HD    11/18/2021  Imm Admin: Influenza, Unspecified - HISTORICAL DATA    09/25/2019  Imm Admin: Influenza Vaccine Quad Inj (Pf)    09/26/2018  Imm Admin: Influenza Vaccine Adult HD    Only the first 5 history entries have been loaded, but more history exists.              Pneumococcal Vaccine: 65+ Years (Series Information) Completed      09/21/2023  Imm Admin: Pneumococcal Conjugate Vaccine (PCV20)    06/18/2013  Imm Admin: Pneumococcal polysaccharide vaccine (PPSV-23)              COVID-19 Vaccine (Series Information) Completed      09/24/2023  Imm Admin: Comirnaty (Covid-19 Vaccine, Mrna, [id number] Formula)    01/31/2023  Imm  Admin: PFIZER BIVALENT SARS-COV-2 VACCINE (12+)    10/30/2021  Imm Admin: PFIZER PURPLE CAP SARS-COV-2 VACCINATION (12+)    01/12/2021  Imm Admin: PFIZER PURPLE CAP SARS-COV-2 VACCINATION (12+)    12/22/2020  Imm Admin: PFIZER PURPLE CAP SARS-COV-2 VACCINATION (12+)    Only the first 5 history entries have been loaded, but more history exists.              Hepatitis B Vaccine (Hep B) (Series Information) Aged Out      No completion history exists for this topic.              HPV Vaccines (Series Information) Aged Out      No completion history exists for this topic.              Polio Vaccine (Inactivated Polio) (Series Information) Aged Out      No completion history exists for this topic.              Meningococcal Immunization (Series Information) Aged Out      No completion history exists for this topic.              Discontinued - Lung Cancer Screening Shared Decision Making  Discontinued        Frequency changed to Never automatically (Topic No Longer Applies)    10/24/2023  Reason not specified                    Patient Care Team:  Michelle Rainey P.A.-C. as PCP - General (Physician Assistant)  Andrew Kumari M.D. as PCP - [facility] Paneled  Avtar Hanson M.D. as Consulting Physician (Urology)  FERNANDA Segovia M.D. as Consulting Physician (Neurology)  Alexander Resee M.D. as Consulting Physician (Ophthalmology)    Financial Resource Strain: Low Risk  (5/28/2024)    Overall Financial Resource Strain (CARDIA)     Difficulty of Paying Living Expenses: Not hard at all      Transportation Needs: Unmet Transportation Needs (5/28/2024)    PRAPARE - Transportation     Lack of Transportation (Medical): Yes     Lack of Transportation (Non-Medical): Yes      Food Insecurity: No Food Insecurity (5/28/2024)    Hunger Vital Sign     Worried About Running Out of Food in the Last Year: Never true     Ran Out of Food in the Last Year: Never true        Encounter Vitals  Blood Pressure : 118/62  Weight: 80.7 kg (178  "lb)  Height: 177.8 cm (5' 10\")  BMI (Calculated): 25.54  Pain Score: 2=Minimal-Slight     Alert, oriented in no acute distress.  Eye contact is good, speech goal directed, affect calm.    Assessment and Plan. The following treatment and monitoring plan is recommended:    Atherosclerosis of aorta (HCC)  Chronic, stable. Reviewed CT lung cancer screening from October 2023. Currently taking aspirin 81 mg daily. Denies chest pain and weakness of extremities.    BPH (benign prostatic hyperplasia)  Chronic, stable. Currently taking terazosin 10 mg daily. Denies obstructive symptoms, dysuria, and flank pain. Followed by urology, Dr. Vargas.    BMI 25.0-25.9,adult  BMI is 25.54 kg/m2. Provided education on heart healthy diet with adequate intake of fruits, vegetables, and whole grains. Encourage 30 minutes of moderate exercise 3-4 times a week.    Cerebral atrophy (HCC)  Chronic, stable. Reviewed MRI of the brain from July 2022. Denies visual disturbances, loss of coordination, and changes in mentation.    Dyslipidemia  Chronic, stable. Reviewed lipid profile from December 2023. Currently taking atorvastatin 10 mg daily. Reports that he walks at the [employer] or [employer] a few times a week. Provided [employer] gym resources. Denies chest pain and claudication.    Gastroesophageal reflux disease without esophagitis  Chronic, stable. Currently taking omeprazole 20 mg daily. Reports avoiding dietary triggers. Denies early satiety, unintentional weight loss, belching, and persistent burning pain in chest or upper abdomen.    Other emphysema (HCC)  Chronic, stable. Reviewed pulmonary function tests from 2010. No current medication use. Denies cough and shortness of breath.     Prediabetes  Chronic, stable. Most recent hemoglobin A1C was 5.9 in December 2023. No current medication use, diet-controlled. Routinely monitored by primary care provider.    Primary hypertension  Chronic, stable. Currently taking " amlodipine-benazepril 5-20 mg daily. In-office blood pressure is 118/62. Denies chest pain, lightheadedness, and lower extremity edema.       Services suggested: No services needed at this time  Health Care Screening: Age-appropriate preventive services recommended by USPTF and ACIP covered by Medicare were discussed today. Services ordered if indicated and agreed upon by the patient.  Referrals offered: Community-based lifestyle interventions to reduce health risks and promote self-management and wellness, fall prevention, nutrition, physical activity, tobacco-use cessation, weight loss, and mental health services as per orders if indicated.    Discussion today about general wellness and lifestyle habits:    Prevent falls and reduce trip hazards; Cautioned about securing or removing rugs.  Have a working fire alarm and carbon monoxide detector.  Engage in regular physical activity and social activities.    Follow-up: Return for appointment with Primary Care Provider as needed.

## 2024-05-29 DIAGNOSIS — R39.12 BENIGN PROSTATIC HYPERPLASIA WITH WEAK URINARY STREAM: ICD-10-CM

## 2024-05-29 DIAGNOSIS — N40.1 BENIGN PROSTATIC HYPERPLASIA WITH WEAK URINARY STREAM: ICD-10-CM

## 2024-05-29 RX ORDER — OMEPRAZOLE 20 MG/1
20 CAPSULE, DELAYED RELEASE ORAL
Qty: 90 CAPSULE | Refills: 3 | Status: SHIPPED | OUTPATIENT
Start: 2024-05-29

## 2024-05-29 NOTE — TELEPHONE ENCOUNTER
Received request via: Pharmacy    Was the patient seen in the last year in this department? Yes    Does the patient have an active prescription (recently filled or refills available) for medication(s) requested? No    [facility]     Does the patient have long-term Plus and need 100 day supply (blood pressure, diabetes and cholesterol meds only)? Medication is not for cholesterol, blood pressure or diabetes

## 2024-05-30 RX ORDER — TERAZOSIN 5 MG/1
CAPSULE ORAL
Qty: 180 CAPSULE | Refills: 1 | Status: SHIPPED | OUTPATIENT
Start: 2024-05-30

## 2024-06-11 ENCOUNTER — HOSPITAL ENCOUNTER (OUTPATIENT)
Facility: MEDICAL CENTER | Age: 69
End: 2024-06-11
Attending: STUDENT IN AN ORGANIZED HEALTH CARE EDUCATION/TRAINING PROGRAM
Payer: MEDICARE

## 2024-06-11 ENCOUNTER — PROCEDURE VISIT (OUTPATIENT)
Dept: UROLOGY | Facility: MEDICAL CENTER | Age: 69
End: 2024-06-11
Payer: MEDICARE

## 2024-06-11 VITALS — BODY MASS INDEX: 25.48 KG/M2 | WEIGHT: 178 LBS | HEIGHT: 70 IN

## 2024-06-11 DIAGNOSIS — R39.12 BENIGN PROSTATIC HYPERPLASIA WITH WEAK URINARY STREAM: ICD-10-CM

## 2024-06-11 DIAGNOSIS — N40.1 BENIGN PROSTATIC HYPERPLASIA WITH WEAK URINARY STREAM: ICD-10-CM

## 2024-06-11 PROCEDURE — 87077 CULTURE AEROBIC IDENTIFY: CPT

## 2024-06-11 PROCEDURE — 87086 URINE CULTURE/COLONY COUNT: CPT

## 2024-06-11 PROCEDURE — 87186 SC STD MICRODIL/AGAR DIL: CPT

## 2024-06-11 RX ORDER — LEVOFLOXACIN 500 MG/1
500 TABLET, FILM COATED ORAL DAILY
Qty: 7 TABLET | Refills: 0 | Status: SHIPPED | OUTPATIENT
Start: 2024-06-11

## 2024-06-11 ASSESSMENT — FIBROSIS 4 INDEX: FIB4 SCORE: 1.84

## 2024-06-14 ENCOUNTER — TELEPHONE (OUTPATIENT)
Dept: UROLOGY | Facility: MEDICAL CENTER | Age: 69
End: 2024-06-14
Payer: MEDICARE

## 2024-06-14 RX ORDER — CEFDINIR 300 MG/1
300 CAPSULE ORAL 2 TIMES DAILY
Qty: 14 CAPSULE | Refills: 0 | Status: SHIPPED | OUTPATIENT
Start: 2024-06-14

## 2024-06-17 RX ORDER — AMLODIPINE BESYLATE AND BENAZEPRIL HYDROCHLORIDE 5; 20 MG/1; MG/1
1 CAPSULE ORAL DAILY
Qty: 100 CAPSULE | Refills: 1 | Status: SHIPPED | OUTPATIENT
Start: 2024-06-17

## 2024-06-28 ENCOUNTER — PROCEDURE VISIT (OUTPATIENT)
Dept: UROLOGY | Facility: MEDICAL CENTER | Age: 69
End: 2024-06-28
Payer: MEDICARE

## 2024-06-28 ENCOUNTER — HOSPITAL ENCOUNTER (OUTPATIENT)
Facility: MEDICAL CENTER | Age: 69
End: 2024-06-28
Attending: STUDENT IN AN ORGANIZED HEALTH CARE EDUCATION/TRAINING PROGRAM
Payer: MEDICARE

## 2024-06-28 VITALS — HEIGHT: 70 IN | BODY MASS INDEX: 25.48 KG/M2 | WEIGHT: 178 LBS

## 2024-06-28 DIAGNOSIS — N40.1 BENIGN PROSTATIC HYPERPLASIA WITH WEAK URINARY STREAM: ICD-10-CM

## 2024-06-28 DIAGNOSIS — R39.12 BENIGN PROSTATIC HYPERPLASIA WITH WEAK URINARY STREAM: ICD-10-CM

## 2024-06-28 LAB
APPEARANCE UR: CLEAR
BILIRUB UR STRIP-MCNC: NORMAL MG/DL
COLOR UR AUTO: NORMAL
GLUCOSE UR STRIP.AUTO-MCNC: NORMAL MG/DL
KETONES UR STRIP.AUTO-MCNC: 15 MG/DL
LEUKOCYTE ESTERASE UR QL STRIP.AUTO: NORMAL
NITRITE UR QL STRIP.AUTO: NORMAL
PH UR STRIP.AUTO: 5.5 [PH] (ref 5–8)
PROT UR QL STRIP: NORMAL MG/DL
RBC UR QL AUTO: NORMAL
SP GR UR STRIP.AUTO: 1.02
UROBILINOGEN UR STRIP-MCNC: 0.2 MG/DL

## 2024-06-28 PROCEDURE — 87186 SC STD MICRODIL/AGAR DIL: CPT

## 2024-06-28 PROCEDURE — 87086 URINE CULTURE/COLONY COUNT: CPT

## 2024-06-28 PROCEDURE — 87077 CULTURE AEROBIC IDENTIFY: CPT

## 2024-06-28 RX ORDER — NITROFURANTOIN 25; 75 MG/1; MG/1
100 CAPSULE ORAL 2 TIMES DAILY
Qty: 20 CAPSULE | Refills: 0 | Status: SHIPPED | OUTPATIENT
Start: 2024-06-28

## 2024-06-28 ASSESSMENT — FIBROSIS 4 INDEX: FIB4 SCORE: 1.84

## 2024-06-28 NOTE — PROGRESS NOTES
Subjective  Tristin Moscoso is a 68 y.o. male who presents today for cystoscopy to evaluate BPH and transrectal ultrasound to size his prostate.  Patient still has very bothersome LUTS, mainly with a weak stream and difficulty starting his stream.    Family History   Problem Relation Age of Onset    Seizures Mother     Cancer Sister         eldest sister-lung/chest       Social History     Socioeconomic History    Marital status: Single     Spouse name: Not on file    Number of children: Not on file    Years of education: Not on file    Highest education level: GED or equivalent   Occupational History    Not on file   Tobacco Use    Smoking status: Former     Current packs/day: 0.00     Average packs/day: 0.5 packs/day for 54.9 years (27.4 ttl pk-yrs)     Types: Cigarettes, Cigars     Start date: 1968     Quit date: 2023     Years since quittin.0    Smokeless tobacco: Never   Vaping Use    Vaping status: Never Used   Substance and Sexual Activity    Alcohol use: Never    Drug use: Never    Sexual activity: Not Currently     Partners: Female   Other Topics Concern    Not on file   Social History Narrative    RETIRED -   at [employer]  .     Social Determinants of Health     Financial Resource Strain: Low Risk  (2024)    Overall Financial Resource Strain (CARDIA)     Difficulty of Paying Living Expenses: Not hard at all   Food Insecurity: No Food Insecurity (2024)    Hunger Vital Sign     Worried About Running Out of Food in the Last Year: Never true     Ran Out of Food in the Last Year: Never true   Transportation Needs: Unmet Transportation Needs (2024)    PRAPARE - Transportation     Lack of Transportation (Medical): Yes     Lack of Transportation (Non-Medical): Yes   Physical Activity: Insufficiently Active (8/10/2023)    Exercise Vital Sign     Days of Exercise per Week: 3 days     Minutes of Exercise per Session: 30 min   Stress: No Stress Concern Present  (8/10/2023)    [facility] - Occupational Stress Questionnaire     Feeling of Stress : Only a little   Social Connections: Unknown (8/10/2023)    Social Connection and Isolation Panel [NHANES]     Frequency of Communication with Friends and Family: More than three times a week     Frequency of Social Gatherings with Friends and Family: More than three times a week     Attends Worship Services: Not on file     Active Member of Clubs or Organizations: Patient declined     Attends Club or Organization Meetings: More than 4 times per year     Marital Status: Living with partner   Intimate Partner Violence: Not on file   Housing Stability: Unknown (8/10/2023)    Housing Stability Vital Sign     Unable to Pay for Housing in the Last Year: No     Number of Places Lived in the Last Year: Not on file     Unstable Housing in the Last Year: No       Past Surgical History:   Procedure Laterality Date    SHOULDER ARTHROPLASTY TOTAL Left 01/02/2019    Procedure: SHOULDER ARTHROPLASTY TOTAL - REVERSE W/LATISSIMUS TRANSFER;  Surgeon: Marco Antonio Morel M.D.;  Location: SURGERY SAME DAY [facility];  Service: Orthopedics    VITRECTOMY POSTERIOR Right 06/04/2018    Procedure: VITRECTOMY POSTERIOR-  ENDO LASER, AIR FLUID EXCHANGE, SF6 INTRAOCULAR GAS;  Surgeon: Rolf Bee M.D.;  Location: SURGERY SAME DAY [facility];  Service: Ophthalmology    KNEE ARTHROSCOPY  08/28/2012    Performed by ERIK MUHAMMAD at SURGERY SAME DAY [facility]    MENISCECTOMY, KNEE, MEDIAL  08/28/2012    Performed by ERIK MUHAMMAD at SURGERY SAME DAY [facility]    OTHER  01/01/1983    kidney stones    ABDOMINAL EXPLORATION      APPENDECTOMY      EYE SURGERY      cataract bilateral-had lens    OTHER      lense implants 6-7 years       Past Medical History:   Diagnosis Date    Allergy     Blood transfusion without reported diagnosis 11 yoa    during surgery/  In [place]    Cataract     s/p surgery christi    Dental  "disorder     full upper denture     Heart burn     High cholesterol     Hyperlipidemia     Kidney disease     kidney stone at 10 yoa    Other emphysema (HCC) 01/11/2022    Snoring     Stroke ([facility]) 06/2013    see ER records; anant consult after. presented with left sided weakness. takes 81mg daily       Current Outpatient Medications   Medication Sig Dispense Refill    nitrofurantoin (MACROBID) 100 MG Cap Take 1 Capsule by mouth 2 times a day. 20 Capsule 0    amlodipine-benazepril (LOTREL) 5-20 MG per capsule Take 1 Capsule by mouth every day. 100 Capsule 1    cefdinir (OMNICEF) 300 MG Cap Take 1 Capsule by mouth 2 times a day. 14 Capsule 0    terazosin (HYTRIN) 5 MG Cap TAKE 1 CAPSULE BY MOUTH EVERY EVENING. MAY TAKE 2 CAPS IF 1 ISN'T WORKING 180 Capsule 1    omeprazole (PRILOSEC) 20 MG delayed-release capsule Take 1 Capsule by mouth every day. 90 Capsule 3    tadalafil (CIALIS) 5 MG tablet Take 1 Tablet by mouth every day. 30 Tablet 3    Azelastine (ASTELIN) 137 MCG/SPRAY Solution ADMINISTER 1 SPRAY INTO AFFECTED NOSTRIL(S) 2 TIMES A DAY. 90 mL 3    buPROPion SR (WELLBUTRIN-SR) 150 MG TABLET SR 12 HR sustained-release tablet Take 1 Tablet by mouth 2 times a day. 180 Tablet 3    benzonatate (TESSALON) 100 MG Cap TAKE 1 TO 2 CAPSULES BY MOUTH 3 TIMES A DAY AS NEEDED FOR COUGH 100 Capsule 0    atorvastatin (LIPITOR) 10 MG Tab TAKE 1 TABLET BY MOUTH EVERY  Tablet 3    aspirin EC (ECOTRIN) 81 MG Tablet Delayed Response Take 1 Tablet by mouth every day.      levoFLOXacin (LEVAQUIN) 500 MG tablet Take 1 Tablet by mouth every day. 7 Tablet 0     No current facility-administered medications for this visit.       Allergies   Allergen Reactions    Kiwi Extract      Tongue itching     Penicillins Hives and Itching    Sulfa Drugs      Dizzy,tongue swells       Objective  Ht 1.778 m (5' 10\")   Wt 80.7 kg (178 lb)   Physical Exam  Constitutional:       Appearance: Normal appearance.   HENT:      Head: Normocephalic and " atraumatic.   Eyes:      Extraocular Movements: Extraocular movements intact.      Conjunctiva/sclera: Conjunctivae normal.   Pulmonary:      Effort: No respiratory distress.   Musculoskeletal:      Cervical back: Normal range of motion.   Skin:     General: Skin is warm and dry.   Neurological:      General: No focal deficit present.      Mental Status: He is alert.   Psychiatric:         Mood and Affect: Mood normal.         Labs:     POC UA  Lab Results   Component Value Date/Time    POCCOLOR dark yellow 06/28/2024 11:15 AM    POCAPPEAR clear 06/28/2024 11:15 AM    POCLEUKEST small 06/28/2024 11:15 AM    POCNITRITE pos 06/28/2024 11:15 AM    POCUROBILIGE 0.2 06/28/2024 11:15 AM    POCPROTEIN neg 06/28/2024 11:15 AM    POCURPH 5.5 06/28/2024 11:15 AM    POCBLOOD neg 06/28/2024 11:15 AM    POCSPGRV 1.025 06/28/2024 11:15 AM    POCKETONES 15 06/28/2024 11:15 AM    POCBILIRUBIN neg 06/28/2024 11:15 AM    POCGLUCUA neg 06/28/2024 11:15 AM        A1C  Lab Results   Component Value Date/Time    HBA1C 5.9 (H) 12/09/2023 0748    AVGLUC 123 12/09/2023 0748         Imaging:     None    Procedure    Procedure performed: Cystoscopy and transrectal ultrasound of the prostate    Surgeon: Dr. Marnie Vargas    Indications For Procedure: BPH    Blood Loss: 0 cc     Anesthesia: Lidocaine jelly     Specimen: None    Findings:     1. Urethra: no lesions or masses    2. Bladder: no lesions or masses    3. Bilateral ureteral jets observed with clear efflux      4. Prostate: moderate lateral lobe hypertrophy, large median lobe, 4.5 cm prostate length. Prostate volume 37.8 cc    Description of Procedure: The patient was prepped and draped in the usual sterile fashion.  A 17Fr flexible cystoscope was advanced along the urethra into the bladder under direct vision.  We performed a thorough cystoscopic evaluation patient's bladder including retroflexion, findings noted above.  We removed the cystoscope under direct vision to evaluate the  urethra and prostate, findings noted above. I had the patient turn onto his side and performed transrectal ultrasound to obtain the volume of his prostate.  This concluded the procedure, the patient tolerated it well.     Assessment  Patient was instructed to call our office if he develops any signs of infection including increased frequency, urgency, dysuria, fever, or chills.  We discussed the results of the cystoscopy with the patient, all questions and concerns addressed. We discussed the risks and benefits of transurethral resection of the prostate including infection, bleeding, erectile dysfunction, retrograde ejaculation.  He understands he will have a catheter overnight that we will remove the next day.  He understands that if we go forward with this procedure he would likely be able to discontinue all the medications meant to aid in his urination.  He would still require medications for his erectile dysfunction.  He is in agreement to proceed    Plan    1. Benign prostatic hyperplasia with weak urinary stream  - POCT Urinalysis    Other orders  - nitrofurantoin (MACROBID) 100 MG Cap; Take 1 Capsule by mouth 2 times a day.  Dispense: 20 Capsule; Refill: 0    OR for transurethral resection of the prostate

## 2024-07-01 ENCOUNTER — TELEPHONE (OUTPATIENT)
Dept: UROLOGY | Facility: MEDICAL CENTER | Age: 69
End: 2024-07-01
Payer: MEDICARE

## 2024-07-02 RX ORDER — ATORVASTATIN CALCIUM 10 MG/1
10 TABLET, FILM COATED ORAL
Qty: 100 TABLET | Refills: 0 | Status: SHIPPED | OUTPATIENT
Start: 2024-07-02

## 2024-07-05 ENCOUNTER — TELEPHONE (OUTPATIENT)
Dept: UROLOGY | Facility: MEDICAL CENTER | Age: 69
End: 2024-07-05
Payer: MEDICARE

## 2024-07-08 ENCOUNTER — TELEPHONE (OUTPATIENT)
Dept: UROLOGY | Facility: MEDICAL CENTER | Age: 69
End: 2024-07-08
Payer: MEDICARE

## 2024-07-19 ENCOUNTER — TELEPHONE (OUTPATIENT)
Dept: UROLOGY | Facility: MEDICAL CENTER | Age: 69
End: 2024-07-19
Payer: MEDICARE

## 2024-09-18 ENCOUNTER — OFFICE VISIT (OUTPATIENT)
Dept: MEDICAL GROUP | Facility: PHYSICIAN GROUP | Age: 69
End: 2024-09-18
Payer: MEDICARE

## 2024-09-18 VITALS
OXYGEN SATURATION: 97 % | TEMPERATURE: 97.5 F | HEART RATE: 74 BPM | RESPIRATION RATE: 16 BRPM | WEIGHT: 175.6 LBS | SYSTOLIC BLOOD PRESSURE: 100 MMHG | HEIGHT: 70 IN | BODY MASS INDEX: 25.14 KG/M2 | DIASTOLIC BLOOD PRESSURE: 54 MMHG

## 2024-09-18 DIAGNOSIS — N52.9 ERECTILE DYSFUNCTION, UNSPECIFIED ERECTILE DYSFUNCTION TYPE: ICD-10-CM

## 2024-09-18 DIAGNOSIS — R91.1 LUNG NODULE: ICD-10-CM

## 2024-09-18 DIAGNOSIS — D69.6 THROMBOCYTOPENIA (HCC): ICD-10-CM

## 2024-09-18 DIAGNOSIS — E78.5 DYSLIPIDEMIA: ICD-10-CM

## 2024-09-18 DIAGNOSIS — J43.8 OTHER EMPHYSEMA (HCC): ICD-10-CM

## 2024-09-18 DIAGNOSIS — Z87.891 FORMER SMOKER: ICD-10-CM

## 2024-09-18 DIAGNOSIS — R39.12 BENIGN PROSTATIC HYPERPLASIA WITH WEAK URINARY STREAM: ICD-10-CM

## 2024-09-18 DIAGNOSIS — R53.83 FATIGUE, UNSPECIFIED TYPE: ICD-10-CM

## 2024-09-18 DIAGNOSIS — R05.3 CHRONIC COUGH: ICD-10-CM

## 2024-09-18 DIAGNOSIS — R73.03 PREDIABETES: ICD-10-CM

## 2024-09-18 DIAGNOSIS — E55.9 VITAMIN D DEFICIENCY: ICD-10-CM

## 2024-09-18 DIAGNOSIS — I10 PRIMARY HYPERTENSION: ICD-10-CM

## 2024-09-18 DIAGNOSIS — K63.5 POLYP OF COLON, UNSPECIFIED PART OF COLON, UNSPECIFIED TYPE: ICD-10-CM

## 2024-09-18 DIAGNOSIS — N40.1 BENIGN LOCALIZED PROSTATIC HYPERPLASIA WITH LOWER URINARY TRACT SYMPTOMS (LUTS): ICD-10-CM

## 2024-09-18 DIAGNOSIS — N40.1 BENIGN PROSTATIC HYPERPLASIA WITH WEAK URINARY STREAM: ICD-10-CM

## 2024-09-18 PROCEDURE — 99214 OFFICE O/P EST MOD 30 MIN: CPT | Performed by: PHYSICIAN ASSISTANT

## 2024-09-18 PROCEDURE — 3074F SYST BP LT 130 MM HG: CPT | Performed by: PHYSICIAN ASSISTANT

## 2024-09-18 PROCEDURE — 3078F DIAST BP <80 MM HG: CPT | Performed by: PHYSICIAN ASSISTANT

## 2024-09-18 RX ORDER — BENZONATATE 200 MG/1
200 CAPSULE ORAL 3 TIMES DAILY PRN
Qty: 90 CAPSULE | Refills: 5 | Status: SHIPPED | OUTPATIENT
Start: 2024-09-18

## 2024-09-18 RX ORDER — BENAZEPRIL HYDROCHLORIDE 20 MG/1
20 TABLET ORAL DAILY
Qty: 90 TABLET | Refills: 1 | Status: SHIPPED | OUTPATIENT
Start: 2024-09-18

## 2024-09-18 RX ORDER — TADALAFIL 5 MG/1
5 TABLET ORAL DAILY
Qty: 30 TABLET | Refills: 3 | Status: SHIPPED | OUTPATIENT
Start: 2024-09-18

## 2024-09-18 ASSESSMENT — ENCOUNTER SYMPTOMS
SHORTNESS OF BREATH: 0
FEVER: 0
CHILLS: 0

## 2024-09-18 ASSESSMENT — FIBROSIS 4 INDEX: FIB4 SCORE: 1.86

## 2024-09-18 NOTE — ASSESSMENT & PLAN NOTE
Chronic, controlled.     Latest Labs:   Lab Results   Component Value Date/Time    CHOLSTRLTOT 165 12/09/2023 07:48 AM    LDL 84 12/09/2023 07:48 AM    HDL 61 12/09/2023 07:48 AM    TRIGLYCERIDE 100 12/09/2023 07:48 AM      Medications: Atorvastatin 10 mg daily.  Medication side effects: None    Risk calculator: The ASCVD Risk score (Sal BOLDEN, et al., 2019) failed to calculate.

## 2024-09-18 NOTE — PROGRESS NOTES
SUBJECTIVE:     CC:     HPI:   Tristin presents today with the following:    ASSESSMENT & PLAN by Problem:     Problem   Erectile Dysfunction    Chronic, uncontrolled.  Managed by [facility].     Colon Polyp    Colonoscopy 1/16/2024:  Recall 3 years: repeat 1/16/2027.     Lung Nodule    Quit smoking early 2023 with the help of Wellbutrin.  He continues to use Wellbutrin so that he will not start smoking again.  Per lung cancer screening program: repeat chest CT October 2024.     Dyslipidemia    Chronic, controlled.  Tolerating/continue Atorvastatin 10 mg daily.     Prediabetes    Chronic, stable.  A1c 5.9 (5/2023).     Former Smoker    Quit smoking early 2023 with the help of Wellbutrin.  Feels ready to come off Wellbutrin.  Decrease to once daily for 2 weeks then discontinue.  Per lung cancer screening program: repeat chest CT October 2024.     Other Emphysema (Hcc)    Chronic, stable.  Patient quit smoking early 2023.  Patient stopped taking Advair.     Denies SOB.     Chronic Cough    Chronic, intermittent.   Tolerating benzonotate as needed.     Primary Hypertension    Chronic, stable.  Tolerating/continue amlodipine-benazepril 5-20 mg daily.     Vitamin D Deficiency    Chronic, uncontrolled.  Supplementation:   Due to repeat labs.     Bph (Benign Prostatic Hyperplasia)    Chronic, uncontrolled.  Managed by urology, [facility].         Bmi 25.0-25.9,Adult (Resolved)       Repeat chest CT October 2024. Ordered.    Repeat labs.     Return in about 1 year (around 9/18/2025) for lab discussion.        Healthcare Maintenance:      HPI:     Problem List Items Addressed This Visit       BPH (benign prostatic hyperplasia)     Chronic, uncontrolled.  Managed by urology, [facility].    Urology evaluation 6/28/2024, [facility]:  Assessment  Patient was instructed to call our office if he develops any signs of infection including increased frequency, urgency, dysuria, fever, or chills.  We discussed the results of the cystoscopy  with the patient, all questions and concerns addressed. We discussed the risks and benefits of transurethral resection of the prostate including infection, bleeding, erectile dysfunction, retrograde ejaculation.  He understands he will have a catheter overnight that we will remove the next day.  He understands that if we go forward with this procedure he would likely be able to discontinue all the medications meant to aid in his urination.  He would still require medications for his erectile dysfunction.  He is in agreement to proceed     Plan     1. Benign prostatic hyperplasia with weak urinary stream  - POCT Urinalysis     Other orders  - nitrofurantoin (MACROBID) 100 MG Cap; Take 1 Capsule by mouth 2 times a day.  Dispense: 20 Capsule; Refill: 0     OR for transurethral resection of the prostate    1/18/2024:  Patient's insurance is no longer covering alfuzosin.  Changed to terazosin per insurance request.  Starting 5 mg but advised he can increase to 10.  Follow-up with urology if symptoms or not improving.         Relevant Medications    tadalafil (CIALIS) 5 MG tablet    Other Relevant Orders    URINE CULTURE(NEW)    Chronic cough     Chronic, intermittent.   Started years ago.  Started before he got COVID.  Tolerating benzonotate as needed.  occasional SOB.         Relevant Medications    benzonatate (TESSALON) 200 MG capsule    Colon polyp     Colonoscopy 1/16/2024:    Recall 3 years: repeat 1/16/2027.         Dyslipidemia     Chronic, controlled.     Latest Labs:   Lab Results   Component Value Date/Time    CHOLSTRLTOT 165 12/09/2023 07:48 AM    LDL 84 12/09/2023 07:48 AM    HDL 61 12/09/2023 07:48 AM    TRIGLYCERIDE 100 12/09/2023 07:48 AM      Medications: Atorvastatin 10 mg daily.  Medication side effects: None    Risk calculator: The ASCVD Risk score (Cleveland DK, et al., 2019) failed to calculate.            Relevant Orders    Lipid Profile    Erectile dysfunction     Chronic, uncontrolled.  Managed by  [facility].         Relevant Medications    tadalafil (CIALIS) 5 MG tablet    Former smoker     Quit smoking early 2023 with the help of Wellbutrin.  Feels ready to come off Wellbutrin.    Interval history:   He continues to use Wellbutrin so that he will not start smoking again.    10/30/2023 telephone encounter:     As we discussed on the phone, you have several small lung nodules that appear benign (not cancerous).  I recommend we monitor them to make sure they don't change with a follow-up CT in 12 months.            Lung nodule     Quit smoking early 2023 with the help of Wellbutrin.  He continues to use Wellbutrin so that he will not start smoking again.  Per lung cancer screening program: repeat chest CT October 2024.    10/30/2023    Benedicto has several benign appearing lung nodules for which I recommend repeat low dose CT in 12 months. Patient will be notified by my office and a letter will be sent. We will notify the patient in 12 months and get him scheduled for his annual CT at that time.     Incidental findings showed emphysematous changes, atherosclerosis of the aorta (HCC already on problem list), calcified right lung granulomas            Relevant Orders    CT-CHEST (THORAX) W/O    Other emphysema (HCC)     Chronic, stable.  PFT, 2003, reduction in FEV1/FVC, consistent with mild obstructive pulmonary disease.  Patient quit smoking early 2023.  Patient stopped taking Advair.     Denies SOB.         Relevant Medications    benzonatate (TESSALON) 200 MG capsule    Prediabetes     Chronic, stable.  A1c 5.9 (5/2023).         Relevant Orders    HEMOGLOBIN A1C    Primary hypertension     Chronic, stable.  Tolerating/continue amlodipine-benazepril 5-20 mg daily.         Relevant Medications    benazepril (LOTENSIN) 20 MG Tab    tadalafil (CIALIS) 5 MG tablet    Other Relevant Orders    Comp Metabolic Panel    Thrombocytopenia (HCC)    Relevant Orders    CBC WITH DIFFERENTIAL    Vitamin D deficiency      "Chronic, uncontrolled.  Supplementation:          Relevant Orders    VITAMIN D,25 HYDROXY (DEFICIENCY)     Other Visit Diagnoses       Fatigue, unspecified type        Relevant Orders    TSH WITH REFLEX TO FT4    Benign localized prostatic hyperplasia with lower urinary tract symptoms (LUTS)                       ROS:  Review of Systems   Constitutional:  Negative for chills and fever.   Respiratory:  Negative for shortness of breath.    Cardiovascular:  Negative for chest pain.       OBJECTIVE:     Exam:  /54 (BP Location: Left arm, Patient Position: Sitting, BP Cuff Size: Adult)   Pulse 74   Temp 36.4 °C (97.5 °F) (Temporal)   Resp 16   Ht 1.778 m (5' 10\")   Wt 79.7 kg (175 lb 9.6 oz)   SpO2 97%   BMI 25.20 kg/m²  Body mass index is 25.2 kg/m².    Physical Exam  Constitutional:       Appearance: Normal appearance.   HENT:      Head: Normocephalic.   Eyes:      Conjunctiva/sclera: Conjunctivae normal.   Cardiovascular:      Rate and Rhythm: Normal rate and regular rhythm.      Pulses: Normal pulses.      Heart sounds: No murmur heard.     No gallop.   Pulmonary:      Effort: No respiratory distress.      Breath sounds: No wheezing.   Musculoskeletal:         General: No swelling.   Skin:     General: Skin is warm and dry.   Neurological:      General: No focal deficit present.      Mental Status: He is alert.   Psychiatric:         Mood and Affect: Mood normal.         Behavior: Behavior normal.           CHART REVIEW:     Labs:                       Please note that this dictation was created using voice recognition software. I have made every reasonable attempt to correct obvious errors, but I expect that there are errors of grammar and possibly content that I did not discover before finalizing the note.    "

## 2024-09-18 NOTE — ASSESSMENT & PLAN NOTE
Chronic, uncontrolled.  Managed by urology, [facility].    Urology evaluation 6/28/2024, [facility]:  Assessment  Patient was instructed to call our office if he develops any signs of infection including increased frequency, urgency, dysuria, fever, or chills.  We discussed the results of the cystoscopy with the patient, all questions and concerns addressed. We discussed the risks and benefits of transurethral resection of the prostate including infection, bleeding, erectile dysfunction, retrograde ejaculation.  He understands he will have a catheter overnight that we will remove the next day.  He understands that if we go forward with this procedure he would likely be able to discontinue all the medications meant to aid in his urination.  He would still require medications for his erectile dysfunction.  He is in agreement to proceed     Plan     1. Benign prostatic hyperplasia with weak urinary stream  - POCT Urinalysis     Other orders  - nitrofurantoin (MACROBID) 100 MG Cap; Take 1 Capsule by mouth 2 times a day.  Dispense: 20 Capsule; Refill: 0     OR for transurethral resection of the prostate    1/18/2024:  Patient's insurance is no longer covering alfuzosin.  Changed to terazosin per insurance request.  Starting 5 mg but advised he can increase to 10.  Follow-up with urology if symptoms or not improving.

## 2024-09-18 NOTE — ASSESSMENT & PLAN NOTE
Chronic, intermittent.   Started years ago.  Started before he got COVID.  Tolerating benzonotate as needed.  occasional SOB.

## 2024-09-18 NOTE — ASSESSMENT & PLAN NOTE
Quit smoking early 2023 with the help of Wellbutrin.  Feels ready to come off Wellbutrin.    Interval history:   He continues to use Wellbutrin so that he will not start smoking again.    10/30/2023 telephone encounter:     As we discussed on the phone, you have several small lung nodules that appear benign (not cancerous).  I recommend we monitor them to make sure they don't change with a follow-up CT in 12 months.

## 2024-09-18 NOTE — ASSESSMENT & PLAN NOTE
Quit smoking early 2023 with the help of Wellbutrin.  He continues to use Wellbutrin so that he will not start smoking again.  Per lung cancer screening program: repeat chest CT October 2024.    10/30/2023    Benedicto has several benign appearing lung nodules for which I recommend repeat low dose CT in 12 months. Patient will be notified by my office and a letter will be sent. We will notify the patient in 12 months and get him scheduled for his annual CT at that time.     Incidental findings showed emphysematous changes, atherosclerosis of the aorta (HCC already on problem list), calcified right lung granulomas

## 2024-09-18 NOTE — ASSESSMENT & PLAN NOTE
Chronic, stable.  PFT, 2003, reduction in FEV1/FVC, consistent with mild obstructive pulmonary disease.  Patient quit smoking early 2023.  Patient stopped taking Advair.     Denies SOB.

## 2024-09-19 ENCOUNTER — HOSPITAL ENCOUNTER (OUTPATIENT)
Dept: LAB | Facility: MEDICAL CENTER | Age: 69
End: 2024-09-19
Attending: PHYSICIAN ASSISTANT
Payer: MEDICARE

## 2024-09-19 DIAGNOSIS — R53.83 FATIGUE, UNSPECIFIED TYPE: ICD-10-CM

## 2024-09-19 DIAGNOSIS — E55.9 VITAMIN D DEFICIENCY: ICD-10-CM

## 2024-09-19 DIAGNOSIS — R39.12 BENIGN PROSTATIC HYPERPLASIA WITH WEAK URINARY STREAM: ICD-10-CM

## 2024-09-19 DIAGNOSIS — D69.6 THROMBOCYTOPENIA (HCC): ICD-10-CM

## 2024-09-19 DIAGNOSIS — R73.03 PREDIABETES: ICD-10-CM

## 2024-09-19 DIAGNOSIS — N40.1 BENIGN PROSTATIC HYPERPLASIA WITH WEAK URINARY STREAM: ICD-10-CM

## 2024-09-19 DIAGNOSIS — E78.5 DYSLIPIDEMIA: ICD-10-CM

## 2024-09-19 DIAGNOSIS — I10 PRIMARY HYPERTENSION: ICD-10-CM

## 2024-09-19 LAB
ALBUMIN SERPL BCP-MCNC: 4.1 G/DL (ref 3.2–4.9)
ALBUMIN/GLOB SERPL: 1.4 G/DL
ALP SERPL-CCNC: 77 U/L (ref 30–99)
ALT SERPL-CCNC: 28 U/L (ref 2–50)
ANION GAP SERPL CALC-SCNC: 15 MMOL/L (ref 7–16)
AST SERPL-CCNC: 25 U/L (ref 12–45)
BASOPHILS # BLD AUTO: 0.5 % (ref 0–1.8)
BASOPHILS # BLD: 0.05 K/UL (ref 0–0.12)
BILIRUB SERPL-MCNC: 0.4 MG/DL (ref 0.1–1.5)
BUN SERPL-MCNC: 21 MG/DL (ref 8–22)
CALCIUM ALBUM COR SERPL-MCNC: 9 MG/DL (ref 8.5–10.5)
CALCIUM SERPL-MCNC: 9.1 MG/DL (ref 8.5–10.5)
CHLORIDE SERPL-SCNC: 105 MMOL/L (ref 96–112)
CHOLEST SERPL-MCNC: 142 MG/DL (ref 100–199)
CO2 SERPL-SCNC: 19 MMOL/L (ref 20–33)
CREAT SERPL-MCNC: 1.1 MG/DL (ref 0.5–1.4)
EOSINOPHIL # BLD AUTO: 0.23 K/UL (ref 0–0.51)
EOSINOPHIL NFR BLD: 2.2 % (ref 0–6.9)
ERYTHROCYTE [DISTWIDTH] IN BLOOD BY AUTOMATED COUNT: 44.9 FL (ref 35.9–50)
EST. AVERAGE GLUCOSE BLD GHB EST-MCNC: 111 MG/DL
FASTING STATUS PATIENT QL REPORTED: NORMAL
GFR SERPLBLD CREATININE-BSD FMLA CKD-EPI: 72 ML/MIN/1.73 M 2
GLOBULIN SER CALC-MCNC: 3 G/DL (ref 1.9–3.5)
GLUCOSE SERPL-MCNC: 110 MG/DL (ref 65–99)
HBA1C MFR BLD: 5.5 % (ref 4–5.6)
HCT VFR BLD AUTO: 46.2 % (ref 42–52)
HDLC SERPL-MCNC: 57 MG/DL
HGB BLD-MCNC: 15.3 G/DL (ref 14–18)
IMM GRANULOCYTES # BLD AUTO: 0.1 K/UL (ref 0–0.11)
IMM GRANULOCYTES NFR BLD AUTO: 0.9 % (ref 0–0.9)
LDLC SERPL CALC-MCNC: 69 MG/DL
LYMPHOCYTES # BLD AUTO: 2.04 K/UL (ref 1–4.8)
LYMPHOCYTES NFR BLD: 19.2 % (ref 22–41)
MCH RBC QN AUTO: 30.4 PG (ref 27–33)
MCHC RBC AUTO-ENTMCNC: 33.1 G/DL (ref 32.3–36.5)
MCV RBC AUTO: 91.8 FL (ref 81.4–97.8)
MONOCYTES # BLD AUTO: 0.93 K/UL (ref 0–0.85)
MONOCYTES NFR BLD AUTO: 8.8 % (ref 0–13.4)
NEUTROPHILS # BLD AUTO: 7.26 K/UL (ref 1.82–7.42)
NEUTROPHILS NFR BLD: 68.4 % (ref 44–72)
NRBC # BLD AUTO: 0 K/UL
NRBC BLD-RTO: 0 /100 WBC (ref 0–0.2)
PLATELET # BLD AUTO: 205 K/UL (ref 164–446)
PMV BLD AUTO: 10.2 FL (ref 9–12.9)
POTASSIUM SERPL-SCNC: 4.6 MMOL/L (ref 3.6–5.5)
PROT SERPL-MCNC: 7.1 G/DL (ref 6–8.2)
RBC # BLD AUTO: 5.03 M/UL (ref 4.7–6.1)
SODIUM SERPL-SCNC: 139 MMOL/L (ref 135–145)
TRIGL SERPL-MCNC: 82 MG/DL (ref 0–149)
WBC # BLD AUTO: 10.6 K/UL (ref 4.8–10.8)

## 2024-09-19 PROCEDURE — 83036 HEMOGLOBIN GLYCOSYLATED A1C: CPT

## 2024-09-19 PROCEDURE — 80061 LIPID PANEL: CPT

## 2024-09-19 PROCEDURE — 80053 COMPREHEN METABOLIC PANEL: CPT

## 2024-09-19 PROCEDURE — 36415 COLL VENOUS BLD VENIPUNCTURE: CPT

## 2024-09-19 PROCEDURE — 87077 CULTURE AEROBIC IDENTIFY: CPT

## 2024-09-19 PROCEDURE — 87186 SC STD MICRODIL/AGAR DIL: CPT

## 2024-09-19 PROCEDURE — 87086 URINE CULTURE/COLONY COUNT: CPT

## 2024-09-19 PROCEDURE — 82306 VITAMIN D 25 HYDROXY: CPT

## 2024-09-19 PROCEDURE — 84443 ASSAY THYROID STIM HORMONE: CPT

## 2024-09-19 PROCEDURE — 85025 COMPLETE CBC W/AUTO DIFF WBC: CPT

## 2024-09-20 LAB
25(OH)D3 SERPL-MCNC: 40 NG/ML (ref 30–100)
TSH SERPL DL<=0.005 MIU/L-ACNC: 1.78 UIU/ML (ref 0.38–5.33)

## 2024-09-23 RX ORDER — NITROFURANTOIN 25; 75 MG/1; MG/1
100 CAPSULE ORAL 2 TIMES DAILY
Qty: 10 CAPSULE | Refills: 0 | Status: SHIPPED | OUTPATIENT
Start: 2024-09-23 | End: 2024-09-28

## 2024-10-01 ASSESSMENT — PATIENT HEALTH QUESTIONNAIRE - PHQ9
2. FEELING DOWN, DEPRESSED, IRRITABLE, OR HOPELESS: NOT AT ALL
1. LITTLE INTEREST OR PLEASURE IN DOING THINGS: NOT AT ALL

## 2024-10-01 ASSESSMENT — ACTIVITIES OF DAILY LIVING (ADL): BATHING_REQUIRES_ASSISTANCE: 0

## 2024-10-01 ASSESSMENT — ENCOUNTER SYMPTOMS: GENERAL WELL-BEING: GOOD

## 2024-10-02 ENCOUNTER — APPOINTMENT (OUTPATIENT)
Dept: FAMILY PLANNING/WOMEN'S HEALTH CLINIC | Facility: PHYSICIAN GROUP | Age: 69
End: 2024-10-02
Attending: FAMILY MEDICINE
Payer: MEDICARE

## 2024-10-02 DIAGNOSIS — J43.8 OTHER EMPHYSEMA (HCC): ICD-10-CM

## 2024-10-02 DIAGNOSIS — G31.9 CEREBRAL ATROPHY (HCC): ICD-10-CM

## 2024-10-02 DIAGNOSIS — D69.6 THROMBOCYTOPENIA (HCC): ICD-10-CM

## 2024-10-02 DIAGNOSIS — I70.0 ATHEROSCLEROSIS OF AORTA (HCC): ICD-10-CM

## 2024-10-02 DIAGNOSIS — R73.03 PREDIABETES: ICD-10-CM

## 2024-10-02 DIAGNOSIS — G47.33 OSA (OBSTRUCTIVE SLEEP APNEA): ICD-10-CM

## 2024-10-02 DIAGNOSIS — K21.9 GASTROESOPHAGEAL REFLUX DISEASE WITHOUT ESOPHAGITIS: ICD-10-CM

## 2024-10-02 DIAGNOSIS — I10 PRIMARY HYPERTENSION: ICD-10-CM

## 2024-10-02 DIAGNOSIS — E78.5 DYSLIPIDEMIA: ICD-10-CM

## 2024-10-04 ENCOUNTER — HOSPITAL ENCOUNTER (OUTPATIENT)
Dept: RADIOLOGY | Facility: MEDICAL CENTER | Age: 69
End: 2024-10-04
Attending: PHYSICIAN ASSISTANT
Payer: MEDICARE

## 2024-10-04 DIAGNOSIS — R91.1 LUNG NODULE: ICD-10-CM

## 2024-10-04 PROCEDURE — 71250 CT THORAX DX C-: CPT

## 2024-10-07 RX ORDER — ATORVASTATIN CALCIUM 10 MG/1
10 TABLET, FILM COATED ORAL DAILY
Qty: 100 TABLET | Refills: 0 | Status: SHIPPED | OUTPATIENT
Start: 2024-10-07

## 2024-10-30 DIAGNOSIS — I10 PRIMARY HYPERTENSION: ICD-10-CM

## 2024-10-31 RX ORDER — BENAZEPRIL HYDROCHLORIDE 20 MG/1
20 TABLET ORAL DAILY
Qty: 100 TABLET | Refills: 2 | Status: SHIPPED | OUTPATIENT
Start: 2024-10-31

## 2024-11-05 DIAGNOSIS — G45.9 TIA (TRANSIENT ISCHEMIC ATTACK): ICD-10-CM

## 2024-11-05 DIAGNOSIS — F17.200 TOBACCO DEPENDENCE: ICD-10-CM

## 2024-11-05 RX ORDER — BUPROPION HYDROCHLORIDE 150 MG/1
150 TABLET, EXTENDED RELEASE ORAL 2 TIMES DAILY
Qty: 200 TABLET | Refills: 3 | Status: SHIPPED | OUTPATIENT
Start: 2024-11-05

## 2024-11-05 NOTE — TELEPHONE ENCOUNTER
Received request via: Pharmacy    Was the patient seen in the last year in this department? Yes    Does the patient have an active prescription (recently filled or refills available) for medication(s) requested? No    Pharmacy Name:   [facility]/pharmacy #[id number] - [place], [place] - [address] AT [facility]  [address] [address]  Phone: [phone] Fax: [phone]        Does the patient have assisted Plus and need 100-day supply? (This applies to ALL medications) Yes, quantity updated to 100 days

## 2024-12-12 ENCOUNTER — TELEPHONE (OUTPATIENT)
Dept: UROLOGY | Facility: MEDICAL CENTER | Age: 69
End: 2024-12-12
Payer: MEDICARE

## 2024-12-12 DIAGNOSIS — N52.9 ERECTILE DYSFUNCTION, UNSPECIFIED ERECTILE DYSFUNCTION TYPE: ICD-10-CM

## 2024-12-12 DIAGNOSIS — N40.1 BENIGN PROSTATIC HYPERPLASIA WITH WEAK URINARY STREAM: ICD-10-CM

## 2024-12-12 DIAGNOSIS — R39.12 BENIGN PROSTATIC HYPERPLASIA WITH WEAK URINARY STREAM: ICD-10-CM

## 2024-12-12 NOTE — TELEPHONE ENCOUNTER
----- Message from ALANA ZAMORA sent at 12/12/2024  1:18 PM PST -----  Regarding: [facility] of Care  Follow up scheduled month after referral expires - please place referral for future care visits    Thanks

## 2025-01-13 RX ORDER — ATORVASTATIN CALCIUM 10 MG/1
10 TABLET, FILM COATED ORAL DAILY
Qty: 100 TABLET | Refills: 1 | Status: SHIPPED | OUTPATIENT
Start: 2025-01-13 | End: 2025-01-14

## 2025-01-13 NOTE — TELEPHONE ENCOUNTER
Received request via: Pharmacy    Was the patient seen in the last year in this department? Yes    Does the patient have an active prescription (recently filled or refills available) for medication(s) requested? No    Pharmacy Name: [facility]/pharm[facility] #[id number] - [place], [place] - [address] AT [facility]     Does the patient have longterm Plus and need 100-day supply? (This applies to ALL medications) Yes, quantity updated to 100 days

## 2025-01-14 RX ORDER — ATORVASTATIN CALCIUM 10 MG/1
10 TABLET, FILM COATED ORAL DAILY
Qty: 100 TABLET | Refills: 1 | Status: SHIPPED | OUTPATIENT
Start: 2025-01-14

## 2025-01-14 NOTE — TELEPHONE ENCOUNTER
Received request via: Pharmacy    Was the patient seen in the last year in this department? Yes    Does the patient have an active prescription (recently filled or refills available) for medication(s) requested? No    Does the patient have residential Plus and need 100-day supply? (This applies to ALL medications) Yes, quantity updated to 100 days

## 2025-01-18 DIAGNOSIS — N40.1 BENIGN PROSTATIC HYPERPLASIA WITH WEAK URINARY STREAM: ICD-10-CM

## 2025-01-18 DIAGNOSIS — R39.12 BENIGN PROSTATIC HYPERPLASIA WITH WEAK URINARY STREAM: ICD-10-CM

## 2025-01-20 RX ORDER — TERAZOSIN 5 MG/1
CAPSULE ORAL
Qty: 180 CAPSULE | Refills: 2 | Status: SHIPPED | OUTPATIENT
Start: 2025-01-20

## 2025-01-20 NOTE — TELEPHONE ENCOUNTER
Received request via: Pharmacy    Was the patient seen in the last year in this department? Yes    Does the patient have an active prescription (recently filled or refills available) for medication(s) requested? No    Does the patient have nursing home Plus and need 100-day supply? (This applies to ALL medications) Yes, quantity updated to 100 days

## 2025-03-04 ENCOUNTER — APPOINTMENT (OUTPATIENT)
Dept: UROLOGY | Facility: MEDICAL CENTER | Age: 70
End: 2025-03-04
Payer: MEDICARE

## 2025-03-04 DIAGNOSIS — N40.1 BENIGN PROSTATIC HYPERPLASIA WITH INCOMPLETE BLADDER EMPTYING: ICD-10-CM

## 2025-03-04 DIAGNOSIS — R39.14 BENIGN PROSTATIC HYPERPLASIA WITH INCOMPLETE BLADDER EMPTYING: ICD-10-CM

## 2025-03-04 LAB
POC POST-VOID: 55 ML
POC PRE-VOID: NORMAL

## 2025-03-04 PROCEDURE — 99214 OFFICE O/P EST MOD 30 MIN: CPT

## 2025-03-04 PROCEDURE — 51798 US URINE CAPACITY MEASURE: CPT

## 2025-03-04 RX ORDER — TERAZOSIN 10 MG/1
10 CAPSULE ORAL NIGHTLY
Qty: 30 CAPSULE | Refills: 3 | Status: SHIPPED | OUTPATIENT
Start: 2025-03-04

## 2025-03-04 NOTE — PROGRESS NOTES
Subjective  Tristin Moscoso is a 69 y.o. male who presents today for follow up regarding bothersome LUTS.    He reports dribbling at times.     Patient reports sensation of incomplete emptying, weak urinary stream, nocturia (up 2-3 times per night)    Patient denies recent UTI, decreased sugar intake.    Urine culture positive for E. coli in 2024  Urine culture positive for E. coli on 2024  Urine culture positive for E. coli on 2024  Urine culture positive for E. coli on 2023    Last PSA in  1.31.  Patient is taking terazosin 5mg, and tadalafil daily. He reports he is not taking it at this time and was unsure why he was taking it and it was costing too much money so her never picked it up. He is currently taking Terasozin.    He was also taking finasteride which he was on for years and is no longer.    Most bothersome symptom right now is dysuria when he eats spicy food.     Family History   Problem Relation Age of Onset    Seizures Mother     Cancer Sister         eldest sister-lung/chest       Social History     Socioeconomic History    Marital status: Single     Spouse name: Not on file    Number of children: Not on file    Years of education: Not on file    Highest education level: GED or equivalent   Occupational History    Not on file   Tobacco Use    Smoking status: Former     Current packs/day: 0.00     Average packs/day: 0.5 packs/day for 54.9 years (27.4 ttl pk-yrs)     Types: Cigarettes, Cigars     Start date: 1968     Quit date: 2023     Years since quittin.7    Smokeless tobacco: Never   Vaping Use    Vaping status: Never Used   Substance and Sexual Activity    Alcohol use: Never    Drug use: Never    Sexual activity: Not Currently     Partners: Female   Other Topics Concern    Not on file   Social History Narrative    RETIRED -   at [employer].  .     Social Drivers of Health     Financial Resource Strain: Low  Risk  (5/28/2024)    Overall Financial Resource Strain (CARDIA)     Difficulty of Paying Living Expenses: Not hard at all   Food Insecurity: No Food Insecurity (5/28/2024)    Hunger Vital Sign     Worried About Running Out of Food in the Last Year: Never true     Ran Out of Food in the Last Year: Never true   Transportation Needs: Unmet Transportation Needs (5/28/2024)    PRAPARE - Transportation     Lack of Transportation (Medical): Yes     Lack of Transportation (Non-Medical): Yes   Physical Activity: Insufficiently Active (8/10/2023)    Exercise Vital Sign     Days of Exercise per Week: 3 days     Minutes of Exercise per Session: 30 min   Stress: No Stress Concern Present (8/10/2023)    [employer] [employer] - Occupational Stress Questionnaire     Feeling of Stress : Only a little   Social Connections: Unknown (8/10/2023)    Social Connection and Isolation Panel [NHANES]     Frequency of Communication with Friends and Family: More than three times a week     Frequency of Social Gatherings with Friends and Family: More than three times a week     Attends Quaker Services: Not on file     Active Member of Clubs or Organizations: Patient declined     Attends Club or Organization Meetings: More than 4 times per year     Marital Status: Living with partner   Intimate Partner Violence: Not on file   Housing Stability: Unknown (8/10/2023)    Housing Stability Vital Sign     Unable to Pay for Housing in the Last Year: No     Number of Places Lived in the Last Year: Not on file     Unstable Housing in the Last Year: No       Past Surgical History:   Procedure Laterality Date    SHOULDER ARTHROPLASTY TOTAL Left 01/02/2019    Procedure: SHOULDER ARTHROPLASTY TOTAL - REVERSE W/LATISSIMUS TRANSFER;  Surgeon: Marco Antnoio Morel M.D.;  Location: SURGERY SAME DAY [facility];  Service: Orthopedics    VITRECTOMY POSTERIOR Right 06/04/2018    Procedure: VITRECTOMY POSTERIOR-  ENDO LASER, AIR FLUID  EXCHANGE, SF6 INTRAOCULAR GAS;  Surgeon: Rolf Bee M.D.;  Location: SURGERY SAME DAY [facility];  Service: Ophthalmology    KNEE ARTHROSCOPY  08/28/2012    Performed by ERIK MUHAMMAD at SURGERY SAME DAY [facility]    MENISCECTOMY, KNEE, MEDIAL  08/28/2012    Performed by ERIK MUHAMMAD at SURGERY SAME DAY [facility]    OTHER  01/01/1983    kidney stones    ABDOMINAL EXPLORATION      APPENDECTOMY      EYE SURGERY      cataract bilateral-had lens    OTHER      lense implants 6-7 years       Past Medical History:   Diagnosis Date    Allergy     Blood transfusion without reported diagnosis 11 yoa    during surgery/  In [place]    Cataract     s/p surgery christi    Dental disorder     full upper denture     Heart burn     High cholesterol     Hyperlipidemia     Kidney disease     kidney stone at 10 yoa    Other emphysema (HCC) 01/11/2022    Snoring     Stroke (HCC) 06/2013    see ER records; anant consult after. presented with left sided weakness. takes 81mg daily       Current Outpatient Medications   Medication Sig Dispense Refill    terazosin (HYTRIN) 5 MG Cap TAKE 1 CAPSULE BY MOUTH EVERY EVENING. MAY TAKE 2 CAPS IF 1 ISN'T WORKING 180 Capsule 2    atorvastatin (LIPITOR) 10 MG Tab Take 1 Tablet by mouth every day. 1 tab by mouth daily. 100 Tablet 1    buPROPion SR (WELLBUTRIN-SR) 150 MG TABLET SR 12 HR sustained-release tablet TAKE 1 TABLET BY MOUTH TWICE A  Tablet 3    benazepril (LOTENSIN) 20 MG Tab Take 1 Tablet by mouth every day. BLOOD PRESSURE 100 Tablet 2    benzonatate (TESSALON) 200 MG capsule Take 1 Capsule by mouth 3 times a day as needed for Cough. 90 Capsule 5    tadalafil (CIALIS) 5 MG tablet Take 1 Tablet by mouth every day. 30 Tablet 3    omeprazole (PRILOSEC) 20 MG delayed-release capsule Take 1 Capsule by mouth every day. 90 Capsule 3    aspirin EC (ECOTRIN) 81 MG Tablet Delayed Response Take 1 Tablet by mouth every day.       No current facility-administered medications for  this visit.       Allergies   Allergen Reactions    Kiwi Extract      Tongue itching     Penicillins Hives and Itching    Sulfa Drugs      Dizzy,tongue swells       Objective  There were no vitals taken for this visit.  Physical Exam  Constitutional:       Appearance: Normal appearance.   HENT:      Head: Normocephalic and atraumatic.   Pulmonary:      Effort: Pulmonary effort is normal.   Genitourinary:     Comments: PVR 55  Skin:     General: Skin is warm and dry.   Neurological:      Mental Status: He is alert.   Psychiatric:         Mood and Affect: Mood normal.         Behavior: Behavior normal.         Labs:   none    Imaging:   none    Assessment    For bothersome LUTS: We will increase the Terazosin from 5mg to 10mg nightly.  Encouraging stopping spicy foods as it increases dysuria and gives him bothersome symptoms    Nocturia: decrease fluid intake before bed.    Plan    PVR to check for urinary retention  Check to see if symptoms improve with incrasing terasozin to 10mg    Problem List Items Addressed This Visit       BPH (benign prostatic hyperplasia)    Relevant Orders    POCT BLADDER SCAN

## 2025-03-04 NOTE — PATIENT INSTRUCTIONS
Alpha Blocker     Benefits of an Alpha Blocker (tamsulosin/flomax, terazosin, alfuzosin, silodosin):     Improved Urinary Symptoms: Alpha-blockers work by relaxing the smooth muscles in the prostate and bladder neck, reducing urinary obstruction. This leads to improved urinary flow, reduced urinary frequency, decreased urgency, and overall relief from BPH-related symptoms.   Quick Onset of Action: Tamsulosin typically starts working within a few days to a week, providing relatively rapid relief from urinary symptoms. Many patients experience noticeable improvements in their quality of life shortly after starting the medication.   Well-Tolerated: Tamsulosin is generally well-tolerated by most patients. It has a lower likelihood of causing sexual side effects compared to some other BPH medications, such as finasteride. This makes it an attractive option for individuals concerned about sexual function.   Non-Invasive: Tamsulosin is taken orally as a pill, making it a non-invasive treatment option. Patients do not need to undergo surgery or invasive procedures to receive its benefits.       Risks of an Alpha Blocker (tamsulosin/flomax, terazosin, alfuzosin, silodosin):     Orthostatic Hypotension: Alpha-blockers can cause a sudden drop in blood pressure upon standing, resulting in dizziness or fainting. This is more likely to occur when a patient first starts taking the medication or if they miss doses.   Dizziness and Fatigue: Some patients may experience dizziness, lightheadedness, or fatigue while taking tamsulosin. These side effects can affect daily activities, particularly during the initial days of treatment.   Retrograde Ejaculation: Tamsulosin can cause a side effect called retrograde ejaculation, where semen is directed into the bladder instead of exiting the body during ejaculation. While this doesn't typically cause pain or discomfort, it can lead to infertility for men trying to conceive.   Intraoperative  Floppy Iris Syndrome (IFIS): In rare cases, alpha-blockers have been associated with a condition known as IFIS, which can complicate cataract surgery. Patients should inform their eye surgeon if they are taking alpha-blockers before undergoing eye surgery to minimize the risk of complications.   Allergic Reactions: Although uncommon, allergic reactions to alpha-blockers can occur. Signs of an allergic reaction may include hives, difficulty breathing, swelling of the face or throat, and rash. Patients should seek immediate medical attention if they experience any of these symptoms.   What to do: If you experience bothersome side effects or an allergic reaction it is safe to stop this medication suddenly. Please contact your doctor to discuss alternative options or seek emergent medical treatment if needed.

## 2025-03-20 ENCOUNTER — PATIENT MESSAGE (OUTPATIENT)
Dept: HEALTH INFORMATION MANAGEMENT | Facility: OTHER | Age: 70
End: 2025-03-20

## 2025-03-30 SDOH — ECONOMIC STABILITY: INCOME INSECURITY: HOW HARD IS IT FOR YOU TO PAY FOR THE VERY BASICS LIKE FOOD, HOUSING, MEDICAL CARE, AND HEATING?: NOT HARD AT ALL

## 2025-03-30 SDOH — HEALTH STABILITY: PHYSICAL HEALTH: ON AVERAGE, HOW MANY DAYS PER WEEK DO YOU ENGAGE IN MODERATE TO STRENUOUS EXERCISE (LIKE A BRISK WALK)?: 2 DAYS

## 2025-03-30 SDOH — ECONOMIC STABILITY: FOOD INSECURITY: WITHIN THE PAST 12 MONTHS, YOU WORRIED THAT YOUR FOOD WOULD RUN OUT BEFORE YOU GOT MONEY TO BUY MORE.: NEVER TRUE

## 2025-03-30 SDOH — ECONOMIC STABILITY: INCOME INSECURITY: IN THE LAST 12 MONTHS, WAS THERE A TIME WHEN YOU WERE NOT ABLE TO PAY THE MORTGAGE OR RENT ON TIME?: PATIENT DECLINED

## 2025-03-30 SDOH — HEALTH STABILITY: PHYSICAL HEALTH: ON AVERAGE, HOW MANY MINUTES DO YOU ENGAGE IN EXERCISE AT THIS LEVEL?: 20 MIN

## 2025-03-30 SDOH — ECONOMIC STABILITY: HOUSING INSECURITY
IN THE LAST 12 MONTHS, WAS THERE A TIME WHEN YOU DID NOT HAVE A STEADY PLACE TO SLEEP OR SLEPT IN A SHELTER (INCLUDING NOW)?: PATIENT DECLINED

## 2025-03-30 SDOH — HEALTH STABILITY: MENTAL HEALTH
STRESS IS WHEN SOMEONE FEELS TENSE, NERVOUS, ANXIOUS, OR CAN'T SLEEP AT NIGHT BECAUSE THEIR MIND IS TROUBLED. HOW STRESSED ARE YOU?: NOT AT ALL

## 2025-03-30 SDOH — ECONOMIC STABILITY: TRANSPORTATION INSECURITY

## 2025-03-30 ASSESSMENT — LIFESTYLE VARIABLES
SKIP TO QUESTIONS 9-10: 1
HOW OFTEN DO YOU HAVE SIX OR MORE DRINKS ON ONE OCCASION: NEVER
AUDIT-C TOTAL SCORE: 1
SKIP TO QUESTIONS 9-10: 1
HOW OFTEN DO YOU HAVE SIX OR MORE DRINKS ON ONE OCCASION: NEVER
HOW MANY STANDARD DRINKS CONTAINING ALCOHOL DO YOU HAVE ON A TYPICAL DAY: 1 OR 2
HOW OFTEN DO YOU HAVE A DRINK CONTAINING ALCOHOL: MONTHLY OR LESS
AUDIT-C TOTAL SCORE: 1
HOW MANY STANDARD DRINKS CONTAINING ALCOHOL DO YOU HAVE ON A TYPICAL DAY: 1 OR 2
HOW OFTEN DO YOU HAVE A DRINK CONTAINING ALCOHOL: MONTHLY OR LESS

## 2025-03-30 ASSESSMENT — SOCIAL DETERMINANTS OF HEALTH (SDOH)
IN A TYPICAL WEEK, HOW MANY TIMES DO YOU TALK ON THE PHONE WITH FAMILY, FRIENDS, OR NEIGHBORS?: MORE THAN THREE TIMES A WEEK
HOW OFTEN DO YOU ATTENT MEETINGS OF THE CLUB OR ORGANIZATION YOU BELONG TO?: MORE THAN 4 TIMES PER YEAR
HOW OFTEN DO YOU HAVE A DRINK CONTAINING ALCOHOL: MONTHLY OR LESS
HOW OFTEN DO YOU GET TOGETHER WITH FRIENDS OR RELATIVES?: MORE THAN THREE TIMES A WEEK
IN THE PAST 12 MONTHS, HAS THE ELECTRIC, GAS, OIL, OR WATER COMPANY THREATENED TO SHUT OFF SERVICE IN YOUR HOME?: PATIENT DECLINED
HOW OFTEN DO YOU ATTEND CHURCH OR RELIGIOUS SERVICES?: MORE THAN 4 TIMES PER YEAR
HOW OFTEN DO YOU ATTEND CHURCH OR RELIGIOUS SERVICES?: MORE THAN 4 TIMES PER YEAR
HOW OFTEN DO YOU ATTENT MEETINGS OF THE CLUB OR ORGANIZATION YOU BELONG TO?: MORE THAN 4 TIMES PER YEAR
DO YOU BELONG TO ANY CLUBS OR ORGANIZATIONS SUCH AS CHURCH GROUPS UNIONS, FRATERNAL OR ATHLETIC GROUPS, OR SCHOOL GROUPS?: YES
HOW MANY DRINKS CONTAINING ALCOHOL DO YOU HAVE ON A TYPICAL DAY WHEN YOU ARE DRINKING: 1 OR 2
IN A TYPICAL WEEK, HOW MANY TIMES DO YOU TALK ON THE PHONE WITH FAMILY, FRIENDS, OR NEIGHBORS?: MORE THAN THREE TIMES A WEEK
HOW OFTEN DO YOU HAVE SIX OR MORE DRINKS ON ONE OCCASION: NEVER
IN A TYPICAL WEEK, HOW MANY TIMES DO YOU TALK ON THE PHONE WITH FAMILY, FRIENDS, OR NEIGHBORS?: MORE THAN THREE TIMES A WEEK
HOW OFTEN DO YOU GET TOGETHER WITH FRIENDS OR RELATIVES?: MORE THAN THREE TIMES A WEEK
HOW HARD IS IT FOR YOU TO PAY FOR THE VERY BASICS LIKE FOOD, HOUSING, MEDICAL CARE, AND HEATING?: NOT HARD AT ALL
DO YOU BELONG TO ANY CLUBS OR ORGANIZATIONS SUCH AS CHURCH GROUPS UNIONS, FRATERNAL OR ATHLETIC GROUPS, OR SCHOOL GROUPS?: YES
IN THE PAST 12 MONTHS, HAS THE ELECTRIC, GAS, OIL, OR WATER COMPANY THREATENED TO SHUT OFF SERVICE IN YOUR HOME?: PATIENT DECLINED
HOW OFTEN DO YOU ATTEND CHURCH OR RELIGIOUS SERVICES?: MORE THAN 4 TIMES PER YEAR
HOW OFTEN DO YOU GET TOGETHER WITH FRIENDS OR RELATIVES?: MORE THAN THREE TIMES A WEEK
HOW OFTEN DO YOU ATTENT MEETINGS OF THE CLUB OR ORGANIZATION YOU BELONG TO?: MORE THAN 4 TIMES PER YEAR
WITHIN THE PAST 12 MONTHS, YOU WORRIED THAT YOUR FOOD WOULD RUN OUT BEFORE YOU GOT THE MONEY TO BUY MORE: NEVER TRUE
DO YOU BELONG TO ANY CLUBS OR ORGANIZATIONS SUCH AS CHURCH GROUPS UNIONS, FRATERNAL OR ATHLETIC GROUPS, OR SCHOOL GROUPS?: YES

## 2025-03-31 ENCOUNTER — APPOINTMENT (OUTPATIENT)
Dept: INTERNAL MEDICINE | Facility: OTHER | Age: 70
End: 2025-03-31
Payer: MEDICARE

## 2025-03-31 SDOH — HEALTH STABILITY: PHYSICAL HEALTH: ON AVERAGE, HOW MANY DAYS PER WEEK DO YOU ENGAGE IN MODERATE TO STRENUOUS EXERCISE (LIKE A BRISK WALK)?: 2 DAYS

## 2025-03-31 SDOH — HEALTH STABILITY: PHYSICAL HEALTH: ON AVERAGE, HOW MANY MINUTES DO YOU ENGAGE IN EXERCISE AT THIS LEVEL?: 20 MIN

## 2025-03-31 SDOH — ECONOMIC STABILITY: TRANSPORTATION INSECURITY

## 2025-03-31 SDOH — ECONOMIC STABILITY: INCOME INSECURITY: HOW HARD IS IT FOR YOU TO PAY FOR THE VERY BASICS LIKE FOOD, HOUSING, MEDICAL CARE, AND HEATING?: NOT HARD AT ALL

## 2025-03-31 SDOH — ECONOMIC STABILITY: FOOD INSECURITY: WITHIN THE PAST 12 MONTHS, YOU WORRIED THAT YOUR FOOD WOULD RUN OUT BEFORE YOU GOT MONEY TO BUY MORE.: NEVER TRUE

## 2025-03-31 SDOH — ECONOMIC STABILITY: INCOME INSECURITY: IN THE LAST 12 MONTHS, WAS THERE A TIME WHEN YOU WERE NOT ABLE TO PAY THE MORTGAGE OR RENT ON TIME?: PATIENT DECLINED

## 2025-03-31 ASSESSMENT — SOCIAL DETERMINANTS OF HEALTH (SDOH)
HOW OFTEN DO YOU GET TOGETHER WITH FRIENDS OR RELATIVES?: MORE THAN THREE TIMES A WEEK
HOW HARD IS IT FOR YOU TO PAY FOR THE VERY BASICS LIKE FOOD, HOUSING, MEDICAL CARE, AND HEATING?: NOT HARD AT ALL
IN THE PAST 12 MONTHS, HAS THE ELECTRIC, GAS, OIL, OR WATER COMPANY THREATENED TO SHUT OFF SERVICE IN YOUR HOME?: PATIENT DECLINED
DO YOU BELONG TO ANY CLUBS OR ORGANIZATIONS SUCH AS CHURCH GROUPS UNIONS, FRATERNAL OR ATHLETIC GROUPS, OR SCHOOL GROUPS?: YES
HOW OFTEN DO YOU HAVE A DRINK CONTAINING ALCOHOL: MONTHLY OR LESS
HOW OFTEN DO YOU HAVE SIX OR MORE DRINKS ON ONE OCCASION: NEVER
IN A TYPICAL WEEK, HOW MANY TIMES DO YOU TALK ON THE PHONE WITH FAMILY, FRIENDS, OR NEIGHBORS?: MORE THAN THREE TIMES A WEEK
HOW OFTEN DO YOU ATTENT MEETINGS OF THE CLUB OR ORGANIZATION YOU BELONG TO?: MORE THAN 4 TIMES PER YEAR
DO YOU BELONG TO ANY CLUBS OR ORGANIZATIONS SUCH AS CHURCH GROUPS UNIONS, FRATERNAL OR ATHLETIC GROUPS, OR SCHOOL GROUPS?: YES
HOW OFTEN DO YOU ATTEND CHURCH OR RELIGIOUS SERVICES?: MORE THAN 4 TIMES PER YEAR
WITHIN THE PAST 12 MONTHS, YOU WORRIED THAT YOUR FOOD WOULD RUN OUT BEFORE YOU GOT THE MONEY TO BUY MORE: NEVER TRUE
HOW OFTEN DO YOU GET TOGETHER WITH FRIENDS OR RELATIVES?: MORE THAN THREE TIMES A WEEK
HOW OFTEN DO YOU ATTEND CHURCH OR RELIGIOUS SERVICES?: MORE THAN 4 TIMES PER YEAR
HOW OFTEN DO YOU ATTENT MEETINGS OF THE CLUB OR ORGANIZATION YOU BELONG TO?: MORE THAN 4 TIMES PER YEAR
HOW MANY DRINKS CONTAINING ALCOHOL DO YOU HAVE ON A TYPICAL DAY WHEN YOU ARE DRINKING: 1 OR 2
IN A TYPICAL WEEK, HOW MANY TIMES DO YOU TALK ON THE PHONE WITH FAMILY, FRIENDS, OR NEIGHBORS?: MORE THAN THREE TIMES A WEEK

## 2025-03-31 ASSESSMENT — LIFESTYLE VARIABLES
AUDIT-C TOTAL SCORE: 1
HOW OFTEN DO YOU HAVE SIX OR MORE DRINKS ON ONE OCCASION: NEVER
SKIP TO QUESTIONS 9-10: 1
HOW MANY STANDARD DRINKS CONTAINING ALCOHOL DO YOU HAVE ON A TYPICAL DAY: 1 OR 2
HOW OFTEN DO YOU HAVE A DRINK CONTAINING ALCOHOL: MONTHLY OR LESS

## 2025-04-01 ENCOUNTER — APPOINTMENT (OUTPATIENT)
Dept: INTERNAL MEDICINE | Facility: OTHER | Age: 70
End: 2025-04-01
Payer: MEDICARE

## 2025-04-01 VITALS
WEIGHT: 185.6 LBS | HEIGHT: 70 IN | HEART RATE: 60 BPM | SYSTOLIC BLOOD PRESSURE: 125 MMHG | BODY MASS INDEX: 26.57 KG/M2 | DIASTOLIC BLOOD PRESSURE: 60 MMHG | OXYGEN SATURATION: 94 % | TEMPERATURE: 97.4 F

## 2025-04-01 DIAGNOSIS — E78.5 DYSLIPIDEMIA: ICD-10-CM

## 2025-04-01 DIAGNOSIS — E55.9 VITAMIN D DEFICIENCY: ICD-10-CM

## 2025-04-01 DIAGNOSIS — I10 PRIMARY HYPERTENSION: ICD-10-CM

## 2025-04-01 DIAGNOSIS — R39.12 BENIGN PROSTATIC HYPERPLASIA WITH WEAK URINARY STREAM: ICD-10-CM

## 2025-04-01 DIAGNOSIS — N40.1 BENIGN PROSTATIC HYPERPLASIA WITH WEAK URINARY STREAM: ICD-10-CM

## 2025-04-01 DIAGNOSIS — R91.1 LUNG NODULE: ICD-10-CM

## 2025-04-01 DIAGNOSIS — J44.9 CHRONIC OBSTRUCTIVE PULMONARY DISEASE, UNSPECIFIED COPD TYPE (HCC): ICD-10-CM

## 2025-04-01 DIAGNOSIS — N40.1 BENIGN PROSTATIC HYPERPLASIA WITH INCOMPLETE BLADDER EMPTYING: ICD-10-CM

## 2025-04-01 DIAGNOSIS — R73.03 PREDIABETES: ICD-10-CM

## 2025-04-01 DIAGNOSIS — N52.9 ERECTILE DYSFUNCTION, UNSPECIFIED ERECTILE DYSFUNCTION TYPE: ICD-10-CM

## 2025-04-01 DIAGNOSIS — Z87.891 FORMER SMOKER: ICD-10-CM

## 2025-04-01 DIAGNOSIS — R39.14 BENIGN PROSTATIC HYPERPLASIA WITH INCOMPLETE BLADDER EMPTYING: ICD-10-CM

## 2025-04-01 DIAGNOSIS — R05.3 CHRONIC COUGH: ICD-10-CM

## 2025-04-01 DIAGNOSIS — J43.8 OTHER EMPHYSEMA (HCC): ICD-10-CM

## 2025-04-01 PROCEDURE — 3074F SYST BP LT 130 MM HG: CPT | Mod: GE

## 2025-04-01 PROCEDURE — 1126F AMNT PAIN NOTED NONE PRSNT: CPT | Mod: GE

## 2025-04-01 PROCEDURE — 99203 OFFICE O/P NEW LOW 30 MIN: CPT | Mod: GE

## 2025-04-01 PROCEDURE — 3078F DIAST BP <80 MM HG: CPT | Mod: GE

## 2025-04-01 RX ORDER — TERAZOSIN 10 MG/1
10 CAPSULE ORAL NIGHTLY
Qty: 90 CAPSULE | Refills: 3 | Status: SHIPPED | OUTPATIENT
Start: 2025-04-01 | End: 2025-04-15 | Stop reason: SDUPTHER

## 2025-04-01 RX ORDER — OLMESARTAN MEDOXOMIL 20 MG/1
20 TABLET ORAL DAILY
Qty: 100 TABLET | Refills: 3 | Status: SHIPPED | OUTPATIENT
Start: 2025-04-01 | End: 2026-05-06

## 2025-04-01 RX ORDER — TIOTROPIUM BROMIDE INHALATION SPRAY 3.12 UG/1
5 SPRAY, METERED RESPIRATORY (INHALATION) DAILY
Qty: 3 EACH | Refills: 3 | Status: SHIPPED | OUTPATIENT
Start: 2025-04-01

## 2025-04-01 ASSESSMENT — ENCOUNTER SYMPTOMS
MEMORY LOSS: 0
EYE REDNESS: 0
CONSTIPATION: 0
BLURRED VISION: 0
HEARTBURN: 0
CHILLS: 0
FOCAL WEAKNESS: 0
COUGH: 1
ABDOMINAL PAIN: 0
MYALGIAS: 0
SINUS PAIN: 0
EYE PAIN: 0
PALPITATIONS: 0
DOUBLE VISION: 0
WEAKNESS: 0
SPUTUM PRODUCTION: 1
HEADACHES: 0
ORTHOPNEA: 0
DIARRHEA: 0
HALLUCINATIONS: 0
SHORTNESS OF BREATH: 1
WHEEZING: 0
INSOMNIA: 0
SEIZURES: 0
BRUISES/BLEEDS EASILY: 0
FALLS: 0
DIZZINESS: 0
TREMORS: 0
FEVER: 0
NERVOUS/ANXIOUS: 0
WEIGHT LOSS: 0
PND: 0
DEPRESSION: 0
SORE THROAT: 0

## 2025-04-01 ASSESSMENT — FIBROSIS 4 INDEX: FIB4 SCORE: 1.64

## 2025-04-01 ASSESSMENT — PATIENT HEALTH QUESTIONNAIRE - PHQ9: CLINICAL INTERPRETATION OF PHQ2 SCORE: 0

## 2025-04-01 ASSESSMENT — PAIN SCALES - GENERAL: PAINLEVEL_OUTOF10: NO PAIN

## 2025-04-01 NOTE — PROGRESS NOTES
New Patient      Patient Care Team:  Shane Groves M.D. as PCP - General (Internal Medicine)  Andrew Kumari M.D. as PCP - [facility] Paneled  Avtar Hanson M.D. as Consulting Physician (Urology)  FERNANDA Segovia M.D. as Consulting Physician (Neurology)  Alexander Reese M.D. as Consulting Physician (Ophthalmology)    CC: Establish Care with Primary Care Physician Dr. Myers    HPI:  Tristin Moscoso is a 69 y.o. male with relevant medical history of erectile dysfunction, history of lung nodule, dyslipidemia, prediabetes, essential tremors, former smoker, emphysema, hypertension, benign prostatic hyperplasia, GERD, PETER, history of CVA who presents today to establish care.      The patient presents today for follow-up of chronic cough, which he reports has been ongoing for the past 10 years. The cough is associated with exertional shortness of breath, particularly noticeable during long-distance walking or running. He also reports daily production of clear sputum, which improves with the use of benzonatate, though the cough itself remains persistent. The patient was previously diagnosed with COPD, with the last pulmonary function tests in 2003 demonstrating mild obstructive lung disease. Additionally, chest CT imaging has confirmed emphysema. He recalls being prescribed inhalers in the past, but chose not to use them at the time due to lack of understanding about their purpose.    Aside from respiratory complaints, the patient reports feeling well-controlled on his current medications and states that he is fully compliant with them. He also requests a 3-month refill of terazosin, which he uses for benign prostatic hyperplasia, he is  closely followed by his urologist and notes good symptom control with current medication. The extended supply is requested as he may be traveling to [place] for several months.      Preventative health measures:  Cancer screening:       - LDCT: Last  10/04/24,  next within 12 months      - Colonoscopy:  1/16/2024, repeat in 2027.  Hepatitis C:  2015 Negative by his PCP  HbA1c: .5.5 - 9/19/2024  ASCVD: The ASCVD Risk score (Sal BOLDEN, et al., 2019) failed to calculate.   Immunizations: Up to date  Tobacco: 0.5 packs/day for 54.9 years (27.4 ttl pk-yrs), quit in 2023  EtOH: Denies  Recreational drugs: Denies  Diet:  Regular diet       Review of Systems   Constitutional:  Negative for chills, fever and weight loss.   HENT:  Negative for hearing loss, sinus pain, sore throat and tinnitus.    Eyes:  Negative for blurred vision, double vision, pain and redness.   Respiratory:  Positive for cough, sputum production and shortness of breath. Negative for wheezing.    Cardiovascular:  Negative for chest pain, palpitations, orthopnea, leg swelling and PND.   Gastrointestinal:  Negative for abdominal pain, constipation, diarrhea and heartburn.   Genitourinary:  Negative for dysuria and hematuria.   Musculoskeletal:  Negative for falls, joint pain and myalgias.   Skin:  Negative for itching and rash.   Neurological:  Negative for dizziness, tremors, focal weakness, seizures, weakness and headaches.   Endo/Heme/Allergies:  Does not bruise/bleed easily.   Psychiatric/Behavioral:  Negative for depression, hallucinations and memory loss. The patient is not nervous/anxious and does not have insomnia.        Past Medical History:   Diagnosis Date    Allergy     Blood transfusion without reported diagnosis 11 yoa    during surgery/  In [place]    Cataract     s/p surgery christi    Dental disorder     full upper denture     Heart burn     High cholesterol     Hyperlipidemia     Kidney disease     kidney stone at 10 yoa    Other emphysema (HCC) 01/11/2022    Snoring     Stroke (HCC) 06/2013    see ER records; anant consult after. presented with left sided weakness. takes 81mg daily       Past Surgical History:   Procedure Laterality Date    SHOULDER ARTHROPLASTY TOTAL Left 01/02/2019     Procedure: SHOULDER ARTHROPLASTY TOTAL - REVERSE W/LATISSIMUS TRANSFER;  Surgeon: Marco Antonio Morel M.D.;  Location: SURGERY SAME DAY [facility];  Service: Orthopedics    VITRECTOMY POSTERIOR Right 2018    Procedure: VITRECTOMY POSTERIOR-  ENDO LASER, AIR FLUID EXCHANGE, SF6 INTRAOCULAR GAS;  Surgeon: Rolf Bee M.D.;  Location: SURGERY SAME DAY [facility];  Service: Ophthalmology    KNEE ARTHROSCOPY  2012    Performed by ERIK MUHAMMAD at SURGERY SAME DAY [facility]    MENISCECTOMY, KNEE, MEDIAL  2012    Performed by ERIK MUHAMMAD at SURGERY SAME DAY [facility]    OTHER  1983    kidney stones    ABDOMINAL EXPLORATION      APPENDECTOMY      EYE SURGERY      cataract bilateral-had lens    OTHER      lense implants 6-7 years       Family History   Problem Relation Age of Onset    Seizures Mother     Cancer Sister         eldest sister-lung/chest       Social History     Tobacco Use    Smoking status: Former     Current packs/day: 0.00     Average packs/day: 0.5 packs/day for 54.9 years (27.4 ttl pk-yrs)     Types: Cigarettes, Cigars     Start date: 1968     Quit date: 2023     Years since quittin.8    Smokeless tobacco: Never   Vaping Use    Vaping status: Never Used   Substance Use Topics    Alcohol use: Never    Drug use: Never       Occupation: Retired  Education: Some college  Sexual: Active with partner  : No  Activity: No physical activity  Diet: Low carbohydrate.     Current Outpatient Medications   Medication Sig Dispense Refill    terazosin (HYTRIN) 10 MG capsule Take 1 Capsule by mouth every evening. 90 Capsule 3    tiotropium (SPIRIVA RESPIMAT) 2.5 mcg/Act Aero Soln Inhale 2 Inhalations every day. 3 Each 3    olmesartan (BENICAR) 20 MG Tab Take 1 Tablet by mouth every day. 100 Tablet 3    atorvastatin (LIPITOR) 10 MG Tab Take 1 Tablet by mouth every day. 1 tab by mouth daily. 100 Tablet 1    benzonatate (TESSALON) 200 MG capsule Take 1  "Capsule by mouth 3 times a day as needed for Cough. 90 Capsule 5    aspirin EC (ECOTRIN) 81 MG Tablet Delayed Response Take 1 Tablet by mouth every day.       No current facility-administered medications for this visit.       Physical Exam:  /60 (BP Location: Left arm, Patient Position: Sitting, BP Cuff Size: Adult long)   Pulse 60   Temp 36.3 °C (97.4 °F) (Temporal)   Ht 1.765 m (5' 9.5\")   Wt 84.2 kg (185 lb 9.6 oz)   SpO2 94%   BMI 27.02 kg/m²   Physical Exam  Constitutional:       General: He is not in acute distress.     Appearance: Normal appearance.   HENT:      Head: Normocephalic.      Right Ear: Tympanic membrane normal.      Left Ear: Tympanic membrane normal.      Nose: Nose normal. No congestion or rhinorrhea.      Mouth/Throat:      Mouth: Mucous membranes are moist.   Eyes:      General: No scleral icterus.     Extraocular Movements: Extraocular movements intact.      Conjunctiva/sclera: Conjunctivae normal.      Pupils: Pupils are equal, round, and reactive to light.   Cardiovascular:      Rate and Rhythm: Normal rate and regular rhythm.      Pulses: Normal pulses.      Heart sounds: Normal heart sounds. No murmur heard.     No gallop.   Pulmonary:      Effort: Pulmonary effort is normal. No respiratory distress.      Breath sounds: Normal breath sounds. No wheezing, rhonchi or rales.   Chest:      Chest wall: No tenderness.   Abdominal:      General: Bowel sounds are normal. There is no distension.      Palpations: There is no mass.      Tenderness: There is no abdominal tenderness. There is no guarding or rebound.   Genitourinary:     Rectum: Normal.   Musculoskeletal:         General: No swelling or deformity. Normal range of motion.      Cervical back: Normal range of motion and neck supple.      Right lower leg: No edema.      Left lower leg: No edema.   Lymphadenopathy:      Cervical: No cervical adenopathy.   Skin:     General: Skin is warm.      Capillary Refill: Capillary refill " takes less than 2 seconds.      Coloration: Skin is not jaundiced or pale.      Findings: No bruising, erythema or rash.   Neurological:      General: No focal deficit present.      Mental Status: He is alert and oriented to person, place, and time.      Sensory: No sensory deficit.      Motor: No weakness.      Gait: Gait normal.      Deep Tendon Reflexes: Reflexes normal.   Psychiatric:         Mood and Affect: Mood normal.         Behavior: Behavior normal.         Thought Content: Thought content normal.         Judgment: Judgment normal.       Assessment and Plan:     1. Primary hypertension  The patient is currently well-controlled on his existing medications but reports a chronic cough persisting over the past 10 years, which he suspects may have worsened with the use of an ACE inhibitor. Given the well-documented association between ACE inhibitors and chronic dry cough, this medication will be discontinued as a potential contributing factor. To maintain blood pressure control while minimizing adverse effects, the patient will be transitioned to an angiotensin receptor blocker (ARB), which provides similar cardiovascular and renal benefits without the typical ACE inhibitor-related cough. The patient was thoroughly counseled on the importance of daily home blood pressure monitoring and was advised to withhold the medication and seek medical attention if he experiences any symptoms such as dizziness, excessive drowsiness, or syncope. He is in agreement with the plan and will be followed closely to assess both blood pressure response and resolution of chronic cough symptoms after the medication change.    Plan:  - Start olmesartan 20 mg daily  - Discontinue benazepril 20 mg  - Patient was counseled to take his blood pressure daily at home to determine if medication is controlling his blood pressure.  Also was counseled about to stop medication in case he is presenting any side effects.      2. Chronic  obstructive pulmonary disease, unspecified COPD type (HCC)  3. Chronic cough  4. Former smoker  5. Other emphysema (HCC)  The patient presents with a 27-pack-year history of smoking, having successfully quit in 2023. He reports a chronic productive cough ongoing for approximately 10 years, along with episodic exertional shortness of breath. He has been using benzonatate for symptomatic relief, which provides partial improvement but does not resolve the cough completely. He has a known history of mild COPD based on pulmonary function tests from 2003, and CT imaging has demonstrated evidence of emphysema. Although he was previously recommended to use inhalers, he chose not to initiate therapy due to lack of understanding of their role in management. Given the chronicity of symptoms, imaging findings, and smoking history, his clinical picture is most consistent with COPD-associated chronic bronchitis and emphysema. This warrants initiation of inhaler therapy to improve symptoms, reduce exacerbation risk, and slow disease progression. The patient has agreed to begin inhaler treatment at this time. At the same time he will need new PFTs in order to determine the severity of his COPD at this point.    Plan:  - Start Spiriva inhaler  - New PFTs, ordered Future  - Follow-up with results  -If symptoms persist or COPD severity has progressed, consider referral to pulmonology for specialized evaluation and management      6. Lung nodule  Lung nodules stable based on last imaging studies. LDCT from 2024 showed Unchanged bilateral pulmonary nodules measuring up to 5 mm. Additional bilateral smaller bilateral pulmonary nodules are similar to prior. Lung RADS: 2 - Benign Appearance or Behavior Nodules with a very low likelihood of becoming a clinically active cancer due to size or lack of growth. As per guideline radiology recommends follow-up with new LDCT on October 2025.    Plan:  -Patient will need LDCT for October 2025. Will  neeed to be ordered during next visits.       7. Dyslipidemia  The patient has chronic hyperlipidemia, currently well-controlled on atorvastatin 10 mg daily, which he is tolerating without side effects. Plan is to continue current statin therapy and monitor lipid levels routinely to ensure ongoing control.    Plan:  -Continue atorvastatin 10 mg daily  - Lipid profile, ordered Future    8. Prediabetes  The patient has a diagnosis of prediabetes and reports making significant lifestyle changes, including adopting a low-carbohydrate diet and increasing physical activity, particularly through long-distance walking. These efforts appear effective, as his most recent hemoglobin A1c is now within normal range. He is motivated to continue these changes, and at this time, no pharmacologic intervention is necessary. Will continue to monitor A1c and metabolic markers at regular intervals.    Plan:  - A1c control, ordered Future  - Patient is counseled to continue with lifestyle changes and to perform at least 150 minutes of moderate to high intensity exercise to improve metabolic disorder      9. Benign prostatic hyperplasia with weak urinary stream  The patient is currently under urologic care for benign prostatic hyperplasia (BPH) and had been taking terazosin 5 mg daily. Due to persistent urinary symptoms, his urologist recommended increasing the dose to 10 mg daily, which the patient reports has greatly improved his symptoms. He is tolerating the higher dose well and now requests a refill of terazosin 10 mg. Refill provided. Will continue to monitor symptom control and medication tolerance.    Plan:  - Continue terazosin 10 mg daily  - Continue follow-up with urology        Return in about 12 weeks (around 6/24/2025).      Shane Myers M.D., PGY-1, Internal Medicine  [facility]

## 2025-04-10 ENCOUNTER — HOSPITAL ENCOUNTER (OUTPATIENT)
Dept: LAB | Facility: MEDICAL CENTER | Age: 70
End: 2025-04-10
Payer: MEDICARE

## 2025-04-10 DIAGNOSIS — R73.03 PREDIABETES: ICD-10-CM

## 2025-04-10 DIAGNOSIS — I10 PRIMARY HYPERTENSION: ICD-10-CM

## 2025-04-10 DIAGNOSIS — E78.5 DYSLIPIDEMIA: ICD-10-CM

## 2025-04-10 LAB
ALBUMIN SERPL BCP-MCNC: 4.1 G/DL (ref 3.2–4.9)
ALBUMIN/GLOB SERPL: 1.4 G/DL
ALP SERPL-CCNC: 66 U/L (ref 30–99)
ALT SERPL-CCNC: 19 U/L (ref 2–50)
ANION GAP SERPL CALC-SCNC: 11 MMOL/L (ref 7–16)
AST SERPL-CCNC: 20 U/L (ref 12–45)
BILIRUB SERPL-MCNC: 0.5 MG/DL (ref 0.1–1.5)
BUN SERPL-MCNC: 16 MG/DL (ref 8–22)
CALCIUM ALBUM COR SERPL-MCNC: 9.2 MG/DL (ref 8.5–10.5)
CALCIUM SERPL-MCNC: 9.3 MG/DL (ref 8.5–10.5)
CHLORIDE SERPL-SCNC: 106 MMOL/L (ref 96–112)
CHOLEST SERPL-MCNC: 155 MG/DL (ref 100–199)
CO2 SERPL-SCNC: 22 MMOL/L (ref 20–33)
CREAT SERPL-MCNC: 1.09 MG/DL (ref 0.5–1.4)
GFR SERPLBLD CREATININE-BSD FMLA CKD-EPI: 72 ML/MIN/1.73 M 2
GLOBULIN SER CALC-MCNC: 2.9 G/DL (ref 1.9–3.5)
GLUCOSE SERPL-MCNC: 103 MG/DL (ref 65–99)
HDLC SERPL-MCNC: 68 MG/DL
LDLC SERPL CALC-MCNC: 67 MG/DL
POTASSIUM SERPL-SCNC: 4.5 MMOL/L (ref 3.6–5.5)
PROT SERPL-MCNC: 7 G/DL (ref 6–8.2)
SODIUM SERPL-SCNC: 139 MMOL/L (ref 135–145)
TRIGL SERPL-MCNC: 98 MG/DL (ref 0–149)

## 2025-04-10 PROCEDURE — 83036 HEMOGLOBIN GLYCOSYLATED A1C: CPT

## 2025-04-10 PROCEDURE — 82570 ASSAY OF URINE CREATININE: CPT

## 2025-04-10 PROCEDURE — 82043 UR ALBUMIN QUANTITATIVE: CPT

## 2025-04-10 PROCEDURE — 36415 COLL VENOUS BLD VENIPUNCTURE: CPT

## 2025-04-10 PROCEDURE — 80053 COMPREHEN METABOLIC PANEL: CPT

## 2025-04-10 PROCEDURE — 80061 LIPID PANEL: CPT

## 2025-04-11 LAB
CREAT UR-MCNC: 146 MG/DL
EST. AVERAGE GLUCOSE BLD GHB EST-MCNC: 131 MG/DL
HBA1C MFR BLD: 6.2 % (ref 4–5.6)
MICROALBUMIN UR-MCNC: <1.2 MG/DL
MICROALBUMIN/CREAT UR: NORMAL MG/G (ref 0–30)

## 2025-04-15 ENCOUNTER — OFFICE VISIT (OUTPATIENT)
Dept: UROLOGY | Facility: MEDICAL CENTER | Age: 70
End: 2025-04-15
Payer: MEDICARE

## 2025-04-15 DIAGNOSIS — N40.1 BENIGN PROSTATIC HYPERPLASIA WITH INCOMPLETE BLADDER EMPTYING: ICD-10-CM

## 2025-04-15 DIAGNOSIS — R39.14 BENIGN PROSTATIC HYPERPLASIA WITH INCOMPLETE BLADDER EMPTYING: ICD-10-CM

## 2025-04-15 LAB
POC POST-VOID: 78 ML
POC PRE-VOID: NORMAL

## 2025-04-15 PROCEDURE — 51798 US URINE CAPACITY MEASURE: CPT

## 2025-04-15 PROCEDURE — 99214 OFFICE O/P EST MOD 30 MIN: CPT

## 2025-04-15 RX ORDER — TERAZOSIN 10 MG/1
10 CAPSULE ORAL NIGHTLY
Qty: 90 CAPSULE | Refills: 3 | Status: SHIPPED | OUTPATIENT
Start: 2025-04-15

## 2025-04-15 NOTE — PROGRESS NOTES
Subjective  Tristin Moscoso is a 69 y.o. male who presents today for follow up for urinary retention/sensation of incomplete emptying weak urinary stream and nocturia.    At last visit we increased his terazosin from 5 mg to 10 mg.  He reports an improvement in his nocturia, and dysuria, along with sensation of incomplete emptying.    Patient denies any urinary tract infection-like symptoms such as dysuria, hematuria, fevers, chills, flank pain and suprapubic pain.    He would like to continue taking this medication    Family History   Problem Relation Age of Onset    Seizures Mother     Cancer Sister         eldest sister-lung/chest       Social History     Socioeconomic History    Marital status:      Spouse name: Not on file    Number of children: Not on file    Years of education: Not on file    Highest education level: GED or equivalent   Occupational History    Not on file   Tobacco Use    Smoking status: Former     Current packs/day: 0.00     Average packs/day: 0.5 packs/day for 54.9 years (27.4 ttl pk-yrs)     Types: Cigarettes, Cigars     Start date: 1968     Quit date: 2023     Years since quittin.8    Smokeless tobacco: Never   Vaping Use    Vaping status: Never Used   Substance and Sexual Activity    Alcohol use: Never    Drug use: Never    Sexual activity: Not Currently     Partners: Female   Other Topics Concern    Not on file   Social History Narrative    RETIRED -   at [employer].  .     Social Drivers of Health     Financial Resource Strain: Low Risk  (3/31/2025)    Overall Financial Resource Strain (CARDIA)     Difficulty of Paying Living Expenses: Not hard at all   Food Insecurity: No Food Insecurity (3/31/2025)    Hunger Vital Sign     Worried About Running Out of Food in the Last Year: Never true     Ran Out of Food in the Last Year: Never true   Transportation Needs: Unmet Transportation Needs (2024)    PRAPARE - Transportation      Lack of Transportation (Medical): Yes     Lack of Transportation (Non-Medical): Yes   Physical Activity: Insufficiently Active (3/31/2025)    Exercise Vital Sign     Days of Exercise per Week: 2 days     Minutes of Exercise per Session: 20 min   Stress: No Stress Concern Present (3/31/2025)    [employer] [place] [employer] - Occupational Stress Questionnaire     Feeling of Stress : Not at all   Social Connections: Socially Integrated (3/31/2025)    Social Connection and Isolation Panel [NHANES]     Frequency of Communication with Friends and Family: More than three times a week     Frequency of Social Gatherings with Friends and Family: More than three times a week     Attends Worship Services: More than 4 times per year     Active Member of Clubs or Organizations: Yes     Attends Club or Organization Meetings: More than 4 times per year     Marital Status:    Intimate Partner Violence: Not on file   Housing Stability: Patient Declined (3/31/2025)    Housing Stability Vital Sign     Unable to Pay for Housing in the Last Year: Patient declined     Number of Times Moved in the Last Year: Not on file     Homeless in the Last Year: Patient declined       Past Surgical History:   Procedure Laterality Date    SHOULDER ARTHROPLASTY TOTAL Left 01/02/2019    Procedure: SHOULDER ARTHROPLASTY TOTAL - REVERSE W/LATISSIMUS TRANSFER;  Surgeon: Marco Antonio Mroel M.D.;  Location: SURGERY SAME DAY [facility];  Service: Orthopedics    VITRECTOMY POSTERIOR Right 06/04/2018    Procedure: VITRECTOMY POSTERIOR-  ENDO LASER, AIR FLUID EXCHANGE, SF6 INTRAOCULAR GAS;  Surgeon: Rolf Bee M.D.;  Location: SURGERY SAME DAY [facility];  Service: Ophthalmology    KNEE ARTHROSCOPY  08/28/2012    Performed by ERIK MUHAMMAD at SURGERY SAME DAY [facility] ORS    MENISCECTOMY, KNEE, MEDIAL  08/28/2012    Performed by ERIK MUHAMMAD at SURGERY SAME DAY [facility] ORS    OTHER  01/01/1983    kidney stones     ABDOMINAL EXPLORATION      APPENDECTOMY      EYE SURGERY      cataract bilateral-had lens    OTHER      lense implants 6-7 years       Past Medical History:   Diagnosis Date    Allergy     Blood transfusion without reported diagnosis 11 yoa    during surgery/  In [place]    Cataract     s/p surgery christi    Dental disorder     full upper denture     Heart burn     High cholesterol     Hyperlipidemia     Kidney disease     kidney stone at 10 yoa    Other emphysema (HCC) 01/11/2022    Snoring     Stroke (HCC) 06/2013    see ER records; anant consult after. presented with left sided weakness. takes 81mg daily       Current Outpatient Medications   Medication Sig Dispense Refill    terazosin (HYTRIN) 10 MG capsule Take 1 Capsule by mouth every evening. 90 Capsule 3    tiotropium (SPIRIVA RESPIMAT) 2.5 mcg/Act Aero Soln Inhale 2 Inhalations every day. 3 Each 3    olmesartan (BENICAR) 20 MG Tab Take 1 Tablet by mouth every day. 100 Tablet 3    atorvastatin (LIPITOR) 10 MG Tab Take 1 Tablet by mouth every day. 1 tab by mouth daily. 100 Tablet 1    benzonatate (TESSALON) 200 MG capsule Take 1 Capsule by mouth 3 times a day as needed for Cough. 90 Capsule 5    aspirin EC (ECOTRIN) 81 MG Tablet Delayed Response Take 1 Tablet by mouth every day.       No current facility-administered medications for this visit.       Allergies   Allergen Reactions    Kiwi Extract      Tongue itching     Penicillins Hives and Itching    Sulfa Drugs      Dizzy,tongue swells       Objective  There were no vitals taken for this visit.  Physical Exam  Genitourinary:     Comments: PVR 78        Labs:   none    Imaging:   none    Assessment    For bothersome lower urinary tract symptoms/nocturia: I have reviewed PVR which is 78 mL.  Patient reports overall improvement in his bothersome lower urinary tract symptoms with the terazosin 10 mg p.o. nightly.    I have ordered a 90-day supply x 1 year    Patient to return to clinic in 1 year or sooner if  needed    Plan  Continue terazosin 10 mg p.o. nightly  PVR check in 1 year or sooner if needed    Problem List Items Addressed This Visit       BPH (benign prostatic hyperplasia)    Relevant Orders    POCT Bladder Scan (Completed)

## 2025-04-20 ASSESSMENT — ENCOUNTER SYMPTOMS: GENERAL WELL-BEING: GOOD

## 2025-04-20 ASSESSMENT — PATIENT HEALTH QUESTIONNAIRE - PHQ9
2. FEELING DOWN, DEPRESSED, IRRITABLE, OR HOPELESS: NOT AT ALL
1. LITTLE INTEREST OR PLEASURE IN DOING THINGS: MORE THAN HALF THE DAYS

## 2025-04-20 ASSESSMENT — ACTIVITIES OF DAILY LIVING (ADL): BATHING_REQUIRES_ASSISTANCE: 0

## 2025-04-21 ENCOUNTER — OFFICE VISIT (OUTPATIENT)
Dept: FAMILY PLANNING/WOMEN'S HEALTH CLINIC | Facility: PHYSICIAN GROUP | Age: 70
End: 2025-04-21
Attending: FAMILY MEDICINE
Payer: MEDICARE

## 2025-04-21 VITALS
HEART RATE: 81 BPM | HEIGHT: 69 IN | BODY MASS INDEX: 27.76 KG/M2 | WEIGHT: 187.4 LBS | SYSTOLIC BLOOD PRESSURE: 120 MMHG | DIASTOLIC BLOOD PRESSURE: 78 MMHG | OXYGEN SATURATION: 92 %

## 2025-04-21 DIAGNOSIS — J43.8 OTHER EMPHYSEMA (HCC): ICD-10-CM

## 2025-04-21 DIAGNOSIS — I10 PRIMARY HYPERTENSION: ICD-10-CM

## 2025-04-21 DIAGNOSIS — G31.9 CEREBRAL ATROPHY (HCC): ICD-10-CM

## 2025-04-21 DIAGNOSIS — Z86.73 HISTORY OF CVA (CEREBROVASCULAR ACCIDENT) WITHOUT RESIDUAL DEFICITS: ICD-10-CM

## 2025-04-21 DIAGNOSIS — N40.0 BENIGN PROSTATIC HYPERPLASIA WITHOUT LOWER URINARY TRACT SYMPTOMS: ICD-10-CM

## 2025-04-21 DIAGNOSIS — E78.5 DYSLIPIDEMIA: ICD-10-CM

## 2025-04-21 PROBLEM — I70.0 ATHEROSCLEROSIS OF AORTA (HCC): Status: RESOLVED | Noted: 2023-02-03 | Resolved: 2025-04-21

## 2025-04-21 PROCEDURE — 3074F SYST BP LT 130 MM HG: CPT | Performed by: NURSE PRACTITIONER

## 2025-04-21 PROCEDURE — 3078F DIAST BP <80 MM HG: CPT | Performed by: NURSE PRACTITIONER

## 2025-04-21 PROCEDURE — G0439 PPPS, SUBSEQ VISIT: HCPCS | Performed by: NURSE PRACTITIONER

## 2025-04-21 PROCEDURE — 1126F AMNT PAIN NOTED NONE PRSNT: CPT | Performed by: NURSE PRACTITIONER

## 2025-04-21 SDOH — ECONOMIC STABILITY: FOOD INSECURITY: WITHIN THE PAST 12 MONTHS, YOU WORRIED THAT YOUR FOOD WOULD RUN OUT BEFORE YOU GOT THE MONEY TO BUY MORE.: NEVER TRUE

## 2025-04-21 SDOH — ECONOMIC STABILITY: HOUSING INSECURITY: AT ANY TIME IN THE PAST 12 MONTHS, WERE YOU HOMELESS OR LIVING IN A SHELTER (INCLUDING NOW)?: NO

## 2025-04-21 SDOH — ECONOMIC STABILITY: HOUSING INSECURITY: IN THE LAST 12 MONTHS, WAS THERE A TIME WHEN YOU WERE NOT ABLE TO PAY THE MORTGAGE OR RENT ON TIME?: NO

## 2025-04-21 SDOH — ECONOMIC STABILITY: FOOD INSECURITY: WITHIN THE PAST 12 MONTHS, THE FOOD YOU BOUGHT JUST DIDN'T LAST AND YOU DIDN'T HAVE MONEY TO GET MORE.: NEVER TRUE

## 2025-04-21 SDOH — ECONOMIC STABILITY: TRANSPORTATION INSECURITY: IN THE PAST 12 MONTHS, HAS LACK OF TRANSPORTATION KEPT YOU FROM MEDICAL APPOINTMENTS OR FROM GETTING MEDICATIONS?: NO

## 2025-04-21 SDOH — ECONOMIC STABILITY: FOOD INSECURITY: HOW HARD IS IT FOR YOU TO PAY FOR THE VERY BASICS LIKE FOOD, HOUSING, MEDICAL CARE, AND HEATING?: NOT HARD AT ALL

## 2025-04-21 ASSESSMENT — PATIENT HEALTH QUESTIONNAIRE - PHQ9: CLINICAL INTERPRETATION OF PHQ2 SCORE: 0

## 2025-04-21 ASSESSMENT — PAIN SCALES - GENERAL: PAINLEVEL_OUTOF10: NO PAIN

## 2025-04-21 ASSESSMENT — FIBROSIS 4 INDEX: FIB4 SCORE: 1.59

## 2025-04-21 ASSESSMENT — ACTIVITIES OF DAILY LIVING (ADL): LACK_OF_TRANSPORTATION: NO

## 2025-04-21 NOTE — ASSESSMENT & PLAN NOTE
Stable on atorvastatin. Records review     Latest Reference Range & Units 04/10/25 10:53   Cholesterol,Tot 100 - 199 mg/dL 155   Triglycerides 0 - 149 mg/dL 98   HDL >=40 mg/dL 68   LDL <100 mg/dL 67   . No musculoskeletal issues associated with the use of a statin. Cont heart healthy diet and regular exercise. Cont f/u with PCP for ongoing monitoring and medication management

## 2025-04-21 NOTE — ASSESSMENT & PLAN NOTE
Stable. Records review MRI brain (6/17/15) reports there is mild generalized atrophy. MRI brain (7/11/22) reports mild generalized volume loss. Patient able to recall 3/3 words/ draw the clock correctly. Patient able to perform all ADLs/ IADLs independently. Cont f/u with PCP for ongoing monitoring

## 2025-04-21 NOTE — ASSESSMENT & PLAN NOTE
Stable on Tamsulosin/ Symptoms improved with rx. No dizziness. Cont f/u with PCP for ongoing monitoring and medication management

## 2025-04-21 NOTE — PROGRESS NOTES
Comprehensive Health Assessment Program     Tristin Moscoso is a 69 y.o. here for his comprehensive health assessment.    Patient Active Problem List    Diagnosis Date Noted    Erectile dysfunction 03/28/2024    Colon polyp 01/26/2024    Thrombocytopenia (HCC) 12/11/2023    Lung nodule 10/30/2023    Dyslipidemia 04/27/2023    Prediabetes 04/27/2023    Essential tremor 02/08/2023    Former smoker 02/03/2023    Cerebral atrophy (HCC) 01/11/2022    Other emphysema (HCC) 01/11/2022    Overweight (BMI 25.0-29.9) 01/11/2022    Chronic cough 01/04/2021    Primary hypertension 11/02/2020    Vitamin D deficiency 12/15/2015    Benign prostatic hyperplasia without lower urinary tract symptoms 12/15/2015    Gastroesophageal reflux disease without esophagitis 12/15/2015    History of positive PPD 09/17/2014    PETER (obstructive sleep apnea) 06/17/2013    History of CVA (cerebrovascular accident) without residual deficits 06/16/2013       Current Outpatient Medications   Medication Sig Dispense Refill    terazosin (HYTRIN) 10 MG capsule Take 1 Capsule by mouth every evening. 90 Capsule 3    tiotropium (SPIRIVA RESPIMAT) 2.5 mcg/Act Aero Soln Inhale 2 Inhalations every day. 3 Each 3    olmesartan (BENICAR) 20 MG Tab Take 1 Tablet by mouth every day. 100 Tablet 3    atorvastatin (LIPITOR) 10 MG Tab Take 1 Tablet by mouth every day. 1 tab by mouth daily. 100 Tablet 1    benzonatate (TESSALON) 200 MG capsule Take 1 Capsule by mouth 3 times a day as needed for Cough. 90 Capsule 5    aspirin EC (ECOTRIN) 81 MG Tablet Delayed Response Take 1 Tablet by mouth every day.       No current facility-administered medications for this visit.          Current supplements as per medication list.     Allergies:   Kiwi extract, Penicillins, and Sulfa drugs  Social History     Tobacco Use    Smoking status: Former     Current packs/day: 0.00     Average packs/day: 0.5 packs/day for 54.9 years (27.4 ttl pk-yrs)     Types: Cigarettes, Cigars      Start date: 1968     Quit date: 2023     Years since quittin.9    Smokeless tobacco: Never   Vaping Use    Vaping status: Never Used   Substance Use Topics    Alcohol use: Never    Drug use: Yes     Comment: Very occasional     Family History   Problem Relation Age of Onset    Seizures Mother     Cancer Sister         eldest sister-lung/chest     Tristin  has a past medical history of Allergy, Blood transfusion without reported diagnosis (11 yoa), Cataract, Dental disorder, Heart burn, High cholesterol, Hyperlipidemia, Kidney disease, Other emphysema (HCC) (2022), Snoring, and Stroke (HCC) (2013).    He has no past medical history of Clotting disorder (HCC) or Diabetic neuropathy ([facility]).   Past Surgical History:   Procedure Laterality Date    SHOULDER ARTHROPLASTY TOTAL Left 2019    Procedure: SHOULDER ARTHROPLASTY TOTAL - REVERSE W/LATISSIMUS TRANSFER;  Surgeon: Marco Antonio Morel M.D.;  Location: SURGERY SAME DAY [facility];  Service: Orthopedics    VITRECTOMY POSTERIOR Right 2018    Procedure: VITRECTOMY POSTERIOR-  ENDO LASER, AIR FLUID EXCHANGE, SF6 INTRAOCULAR GAS;  Surgeon: Rolf Bee M.D.;  Location: SURGERY SAME DAY [facility];  Service: Ophthalmology    KNEE ARTHROSCOPY  2012    Performed by ERIK MUHAMMAD at SURGERY SAME DAY [facility] ORS    MENISCECTOMY, KNEE, MEDIAL  2012    Performed by ERIK MUHAMMAD at SURGERY SAME DAY [facility] ORS    OTHER  1983    kidney stones    ABDOMINAL EXPLORATION      APPENDECTOMY      EYE SURGERY      cataract bilateral-had lens    OTHER      lense implants 6-7 years       Screening:  In the last six months have you experienced any leakage of urine? No    Depression Screening  Little interest or pleasure in doing things?  0 - not at all  Feeling down, depressed , or hopeless? 0 - not at all  Patient Health Questionnaire Score: 0     If depressive symptoms identified deferred to follow up visit unless  specifically addressed in assessment and plan.    Interpretation of PHQ-9 Total Score   Score Severity   1-4 No Depression   5-9 Mild Depression   10-14 Moderate Depression   15-19 Moderately Severe Depression   20-27 Severe Depression    Screening for Cognitive Impairment  Do you or any of your friends or family members have any concern about your memory? No  Three Minute Recall (Village, Kitchen, Baby) 3/3    Mauricio clock face with all 12 numbers and set the hands to show 10 minutes past 11.  Yes 5/5  Cognitive concerns identified deferred for follow up unless specifically addressed in assessment and plan.    Fall Risk Assessment  Has the patient had two or more falls in the last year or any fall with injury in the last year?  No    Safety Assessment  Do you always wear your seatbelt?  Yes  Any changes to home needed to function safely? No  Difficulty hearing.  No  Patient counseled about all safety risks that were identified.    Functional Assessment ADLs  Are there any barriers preventing you from cooking for yourself or meeting nutritional needs?  No.    Are there any barriers preventing you from driving safely or obtaining transportation?  No.    Are there any barriers preventing you from using a telephone or calling for help?  No    Are there any barriers preventing you from shopping?  No.    Are there any barriers preventing you from taking care of your own finances?  No    Are there any barriers preventing you from managing your medications?  No    Are there any barriers preventing you from showering, bathing or dressing yourself? No    Are there any barriers preventing you from doing housework or laundry? No  Are there any barriers preventing you from using the toilet?No  Are you currently engaging in any exercise or physical activity?  Yes. Walks biw     Self-Assessment of Health  What is your perception of your health? Good    Do you sleep more than six hours a night? Yes    In the past 7 days, how much  did pain keep you from doing your normal work? None    Do you spend quality time with family or friends (virtually or in person)? Yes    Do you usually eat a heart healthy diet that constists of a variety of fruits, vegetables, whole grains and fiber? Yes    Do you eat foods high in fat and/or Fast Food more than three times per week? No    How concerned are you that your medical conditions are not being well managed? very    Are you worried that in the next 2 months, you may not have stable housing that you own, rent, or stay in as part of a household? No      Advance Care Planning  Do you have an Advance Directive, Living Will, Durable Power of , or POLST? Yes                  Health Maintenance Summary            Current Care Gaps       Annual Pulmonary Function Test / Spirometry (Yearly) Overdue since 2/8/2011 04/01/2025  Order placed for PULMONARY FUNCTION TESTS -Test requested: Complete Pulmonary Function Test by Shane Groves M.D.    02/08/2010  PFT DICTATED RESULTS              Zoster (Shingles) Vaccines (2 of 2) Overdue since 11/19/2023 09/24/2023  Imm Admin: Zoster Vaccine Recombinant (RZV) (SHINGRIX)              Lung Cancer Screening (Yearly) Overdue since 10/25/2024      10/04/2024  CT-CHEST (THORAX) W/O    10/26/2023  CT-LUNG CANCER-SCREENING              COVID-19 Vaccine (7 - 2024-25 season) Overdue since 3/29/2025      09/29/2024  Imm Admin: Comirnaty (Covid-19 Vaccine, Mrna, [id number] Formula)    09/24/2023  Imm Admin: Comirnaty (Covid-19 Vaccine, Mrna, [id number] Formula)    01/31/2023  Imm Admin: PFIZER BIVALENT SARS-COV-2 VACCINE (12+)    10/30/2021  Imm Admin: PFIZER PURPLE CAP SARS-COV-2 VACCINATION (12+)    01/12/2021  Imm Admin: PFIZER PURPLE CAP SARS-COV-2 VACCINATION (12+)     Only the first 5 history entries have been loaded, but more history exists.                    Upcoming       Annual Wellness Visit (Yearly) Next due on 4/21/2026 04/21/2025   Level of Service: MO ANNUAL WELLNESS VISIT-INCLUDES PPPS SUBSEQUE*    05/28/2024  Level of Service: MO ANNUAL WELLNESS VISIT-INCLUDES PPPS SUBSEQUE*    05/28/2024  Done - Comprehensive Health Assessment    08/11/2023  Level of Service: MO ANNUAL WELLNESS VISIT-INCLUDES PPPS SUBSEQUE*    08/11/2023  Visit Dx: Medicare annual wellness visit, subsequent      Only the first 5 history entries have been loaded, but more history exists.              Colorectal Cancer Screening (Colonoscopy - Every 3 Years) Next due on 1/16/2027 01/16/2024  COLONOSCOPY RESULTS    02/11/2021  REFERRAL TO GI FOR COLONOSCOPY    04/30/2014  OCCULT BLOOD FECES IMMUNOASSAY    01/01/2013  Colonoscopy (Done - polyps-non-cancerous.  GI consultants)              IMM DTaP/Tdap/Td Vaccine (2 - Td or Tdap) Next due on 10/6/2033      10/06/2023  Imm Admin: Tdap Vaccine    06/15/2009  Imm Admin: TD Vaccine                      Completed or No Longer Recommended       Influenza Vaccine (Series Information) Completed      09/29/2024  Imm Admin: Influenza Vaccine Adult HD    09/21/2023  Imm Admin: Influenza Vaccine, Quadrivalent, Adjuvanted (Pf)    10/07/2022  Imm Admin: Influenza Vaccine Adult HD    11/18/2021  Imm Admin: Influenza, Unspecified - HISTORICAL DATA    09/25/2019  Imm Admin: Influenza Vaccine Quad Inj (Pf)      Only the first 5 history entries have been loaded, but more history exists.              Abdominal Aortic Aneurysm (AAA) Screening  Completed      04/19/2021  Outside Procedure: MO US, RETROPERITNL ABD,  LTD    08/27/2020  Outside Procedure: MO US, RETROPERITNL ABD,  LTD    04/06/2015  US-ABDOMEN COMPLETE SURVEY    11/03/2014  CT-ABDOMEN-PELVIS WITH              Hepatitis C Screening  Completed      04/06/2015  Hepatitis C Antibody component of HEPATITIS PANEL ACUTE(4 COMPONENTS)              Lung Cancer Screening Shared Decision Making  Completed      10/24/2023  Reason not specified              Pneumococcal Vaccine: 50+ Years  "(Series Information) Completed      09/21/2023  Imm Admin: Pneumococcal Conjugate Vaccine (PCV20)    06/18/2013  Imm Admin: Pneumococcal polysaccharide vaccine (PPSV-23)              Hepatitis A Vaccine (Hep A) (Series Information) Aged Out      No completion history exists for this topic.              Hepatitis B Vaccine (Hep B) (Series Information) Aged Out     No completion history exists for this topic.              HPV Vaccines (Series Information) Aged Out     No completion history exists for this topic.              Polio Vaccine (Inactivated Polio) (Series Information) Aged Out     No completion history exists for this topic.              Meningococcal Immunization (Series Information) Aged Out     No completion history exists for this topic.                            Patient Care Team:  Shane Groves M.D. as PCP - General (Internal Medicine)  Andrew Kumari M.D. as PCP - [facility]eled  Avtar Hanson M.D. as Consulting Physician (Urology)  FERNANDA Segovia M.D. as Consulting Physician (Neurology)  Alexander Reese M.D. as Consulting Physician (Ophthalmology)      Financial Resource Strain: Low Risk  (4/21/2025)    Overall Financial Resource Strain (CARDIA)     Difficulty of Paying Living Expenses: Not hard at all      Transportation Needs: No Transportation Needs (4/21/2025)    PRAPARE - Transportation     Lack of Transportation (Medical): No     Lack of Transportation (Non-Medical): No      Food Insecurity: No Food Insecurity (4/21/2025)    Hunger Vital Sign     Worried About Running Out of Food in the Last Year: Never true     Ran Out of Food in the Last Year: Never true        Encounter Vitals  Blood Pressure : 120/78  Pulse: 81  Pulse Oximetry: 92 %  Weight: 85 kg (187 lb 6.4 oz)  Height: 175.3 cm (5' 9\")  BMI (Calculated): 27.67  Pain Score: No pain     ROS:  No fever, chills, nausea, vomiting, diarrhea, chest pain or shortness of breath. See HPI.    Physical " Exam:  Constitutional: NAD  HENMT: NC/AT, OP clear, TM's clear, no lymphadenopathy, no thyromegaly.  No JVD. (-) monofilament   Cardiovascular: RRR, No murmur  Lungs: CTAB,  biat   Extremities: 2+ DP, PT and Radial pulses bilaterally. No BLE edema (-) monofilament   Skin: No legions notes  Neurologic: Alert & oriented     Assessment and Plan. The following treatment and monitoring plan is recommended:  Gastroesophageal reflux disease without esophagitis  Stable. BMI 27.67. Cont heart healthy diet and regular exercise. Cont f/u with PCP for ongoing monitoring and discussion      Benign prostatic hyperplasia without lower urinary tract symptoms  Stable on Tamsulosin/ Symptoms improved with rx. No dizziness. Cont f/u with PCP for ongoing monitoring and medication management      Other emphysema (HCC)  Stable Records review PFT (1/9/2003) reports Spirometry demonstrates a normal FEV1 with a reduction in FEV1/FVC ratio consistent with mild obstructive pulmonary disease. CT Lung CA screening  (10/26/23) reports moderate advanced  emphysematous changes in RLL. Rx Spiriva. Lungs diminished. Cont f/u with PCP for ongoing monitoring and medication management       Primary hypertension  Stable. Patient taking olmesartan  BP today 120/78  HR RRR No BLE edema. Cont f/u with PCP for ongoing monitoring and medication management     Dyslipidemia  Stable on atorvastatin. Records review     Latest Reference Range & Units 04/10/25 10:53   Cholesterol,Tot 100 - 199 mg/dL 155   Triglycerides 0 - 149 mg/dL 98   HDL >=40 mg/dL 68   LDL <100 mg/dL 67   . No musculoskeletal issues associated with the use of a statin. Cont heart healthy diet and regular exercise. Cont f/u with PCP for ongoing monitoring and medication management      Cerebral atrophy (HCC)  Stable. Records review MRI brain (6/17/15) reports there is mild generalized atrophy. MRI brain (7/11/22) reports mild generalized volume loss. Patient able to recall 3/3 words/ draw the  clock correctly. Patient able to perform all ADLs/ IADLs independently. Cont f/u with PCP for ongoing monitoring     History of CVA (cerebrovascular accident) without residual deficits  Stable. Hx CVA 2013. Records review MRI brain (7/11/22) reports old infarct. No sequelae. Cont f/u with neurology (Dr. Roca) for ongoing monitoring       Services suggested: No services needed at this time  Health Care Screening: Age-appropriate preventive services recommended by USPTF and ACIP covered by Medicare were discussed today. Services ordered if indicated and agreed upon by the patient.  Referrals offered: Community-based lifestyle interventions to reduce health risks and promote self-management and wellness, fall prevention, nutrition, physical activity, tobacco-use cessation, weight loss, and mental health services as per orders if indicated.    Discussion today about general wellness and lifestyle habits:    Prevent falls and reduce trip hazards; Cautioned about securing or removing rugs.  Have a working fire alarm and carbon monoxide detector.  Engage in regular physical activity and social activities.    Follow-up: Return f/u with PCP as indicated.

## 2025-04-21 NOTE — ASSESSMENT & PLAN NOTE
Stable Records review PFT (1/9/2003) reports Spirometry demonstrates a normal FEV1 with a reduction in FEV1/FVC ratio consistent with mild obstructive pulmonary disease. CT Lung CA screening  (10/26/23) reports moderate advanced  emphysematous changes in RLL. Rx Spiriva. Lungs diminished. Cont f/u with PCP for ongoing monitoring and medication management

## 2025-04-21 NOTE — ASSESSMENT & PLAN NOTE
Stable. BMI 27.67. Cont heart healthy diet and regular exercise. Cont f/u with PCP for ongoing monitoring and discussion

## 2025-04-21 NOTE — ASSESSMENT & PLAN NOTE
Stable. Hx CVA 2013. Records review MRI brain (7/11/22) reports old infarct. No sequelae. Cont f/u with neurology (Dr. Roca) for ongoing monitoring

## 2025-04-21 NOTE — ASSESSMENT & PLAN NOTE
Stable. Patient taking olmesartan  BP today 120/78  HR RRR No BLE edema. Cont f/u with PCP for ongoing monitoring and medication management

## 2025-06-01 DIAGNOSIS — R05.3 CHRONIC COUGH: ICD-10-CM

## 2025-06-02 RX ORDER — BENZONATATE 200 MG/1
200 CAPSULE ORAL 3 TIMES DAILY PRN
Qty: 90 CAPSULE | Refills: 5 | Status: SHIPPED | OUTPATIENT
Start: 2025-06-02

## 2025-06-24 ENCOUNTER — OFFICE VISIT (OUTPATIENT)
Dept: INTERNAL MEDICINE | Facility: OTHER | Age: 70
End: 2025-06-24
Payer: MEDICARE

## 2025-06-24 VITALS
SYSTOLIC BLOOD PRESSURE: 107 MMHG | TEMPERATURE: 98.8 F | HEIGHT: 70 IN | OXYGEN SATURATION: 93 % | BODY MASS INDEX: 27.52 KG/M2 | WEIGHT: 192.2 LBS | DIASTOLIC BLOOD PRESSURE: 60 MMHG | HEART RATE: 69 BPM

## 2025-06-24 DIAGNOSIS — I10 PRIMARY HYPERTENSION: Primary | ICD-10-CM

## 2025-06-24 DIAGNOSIS — R91.1 LUNG NODULE: ICD-10-CM

## 2025-06-24 DIAGNOSIS — R73.03 PREDIABETES: ICD-10-CM

## 2025-06-24 DIAGNOSIS — E78.5 DYSLIPIDEMIA: ICD-10-CM

## 2025-06-24 DIAGNOSIS — J44.9 CHRONIC OBSTRUCTIVE PULMONARY DISEASE, UNSPECIFIED COPD TYPE (HCC): ICD-10-CM

## 2025-06-24 PROBLEM — D69.6 THROMBOCYTOPENIA (HCC): Status: RESOLVED | Noted: 2023-12-11 | Resolved: 2025-06-24

## 2025-06-24 PROCEDURE — 99214 OFFICE O/P EST MOD 30 MIN: CPT | Mod: GC

## 2025-06-24 PROCEDURE — 3078F DIAST BP <80 MM HG: CPT | Mod: GC

## 2025-06-24 PROCEDURE — 1126F AMNT PAIN NOTED NONE PRSNT: CPT | Mod: GC

## 2025-06-24 PROCEDURE — 3074F SYST BP LT 130 MM HG: CPT | Mod: GC

## 2025-06-24 ASSESSMENT — ENCOUNTER SYMPTOMS
DEPRESSION: 0
TREMORS: 0
ORTHOPNEA: 0
PALPITATIONS: 0
NERVOUS/ANXIOUS: 0
CHILLS: 0
WEIGHT LOSS: 0
INSOMNIA: 0
FEVER: 0
ABDOMINAL PAIN: 0
SINUS PAIN: 0
HEARTBURN: 0
HEADACHES: 0
SORE THROAT: 0
EYE PAIN: 0
SEIZURES: 0
COUGH: 1
EYE REDNESS: 0
BRUISES/BLEEDS EASILY: 0
DIZZINESS: 0
FALLS: 0
BLURRED VISION: 0
DIARRHEA: 0
WEAKNESS: 0
FOCAL WEAKNESS: 0
SHORTNESS OF BREATH: 0
MYALGIAS: 0
PND: 0
DOUBLE VISION: 0
MEMORY LOSS: 0
HALLUCINATIONS: 0
CONSTIPATION: 0
WHEEZING: 0

## 2025-06-24 ASSESSMENT — FIBROSIS 4 INDEX: FIB4 SCORE: 1.59

## 2025-06-24 ASSESSMENT — PAIN SCALES - GENERAL: PAINLEVEL_OUTOF10: NO PAIN

## 2025-06-24 NOTE — PROGRESS NOTES
Established Patient    Patient Care Team:  Shane Groves M.D. as PCP - General (Internal Medicine)  Andrew Kumari M.D. as PCP - [facility] Paneled  Avtar Hanson M.D. as Consulting Physician (Urology)  FERNANDA Segovia M.D. as Consulting Physician (Neurology)  Alexander Reese M.D. as Consulting Physician (Ophthalmology)    HPI:  Tristin Moscoso is a 69 y.o. male with relevant medical history of erectile dysfunction, history of lung nodule, dyslipidemia, prediabetes, essential tremors, former smoker, emphysema, hypertension, benign prostatic hyperplasia, GERD, PETER, history of CVA who presents today to follow-up with lab results.     The patient presents for follow-up after prior evaluation for chronic cough, which has significantly improved following a change in her antihypertensive regimen and initiation of Spiriva. She estimates a 70-80% reduction in cough frequency and severity. Currently, he experiences brief mild episodes of coughing approximately 2-3 times per month.  The patient has not seen a pulmonologist.  He denies any associated chest pain, shortness of breath, fever, or chills.    His blood pressure remains well controlled on olmesartan, with consistently normal home readings.  He reports good adherence to her medication regimen and no adverse effects.            Review of Systems   Constitutional:  Negative for chills, fever and weight loss.   HENT:  Negative for hearing loss, sinus pain, sore throat and tinnitus.    Eyes:  Negative for blurred vision, double vision, pain and redness.   Respiratory:  Positive for cough (mild). Negative for shortness of breath and wheezing.    Cardiovascular:  Negative for chest pain, palpitations, orthopnea, leg swelling and PND.   Gastrointestinal:  Negative for abdominal pain, constipation, diarrhea and heartburn.   Genitourinary:  Negative for dysuria and hematuria.   Musculoskeletal:  Negative for falls, joint pain and myalgias.   Skin:  " Negative for itching and rash.   Neurological:  Negative for dizziness, tremors, focal weakness, seizures, weakness and headaches.   Endo/Heme/Allergies:  Does not bruise/bleed easily.   Psychiatric/Behavioral:  Negative for depression, hallucinations and memory loss. The patient is not nervous/anxious and does not have insomnia.    :    Past Medical History[1]    Social History[2]    Current Medications[3]    /60 (BP Location: Left arm, Patient Position: Sitting, BP Cuff Size: Adult long)   Pulse 69   Temp 37.1 °C (98.8 °F) (Temporal)   Ht 1.778 m (5' 10\")   Wt 87.2 kg (192 lb 3.2 oz)   SpO2 93%   BMI 27.58 kg/m²   Physical Exam  Constitutional:       General: He is not in acute distress.     Appearance: Normal appearance.   HENT:      Head: Normocephalic.      Right Ear: Tympanic membrane normal.      Left Ear: Tympanic membrane normal.      Nose: Nose normal. No congestion or rhinorrhea.      Mouth/Throat:      Mouth: Mucous membranes are moist.   Eyes:      General: No scleral icterus.     Extraocular Movements: Extraocular movements intact.      Conjunctiva/sclera: Conjunctivae normal.      Pupils: Pupils are equal, round, and reactive to light.   Cardiovascular:      Rate and Rhythm: Normal rate and regular rhythm.      Pulses: Normal pulses.      Heart sounds: Normal heart sounds. No murmur heard.     No gallop.   Pulmonary:      Effort: Pulmonary effort is normal. No respiratory distress.      Breath sounds: Normal breath sounds. No wheezing, rhonchi or rales.   Chest:      Chest wall: No tenderness.   Abdominal:      General: Bowel sounds are normal. There is no distension.      Palpations: There is no mass.      Tenderness: There is no abdominal tenderness. There is no guarding or rebound.   Genitourinary:     Rectum: Normal.   Musculoskeletal:         General: No swelling or deformity. Normal range of motion.      Cervical back: Normal range of motion and neck supple.      Right lower leg: No " edema.      Left lower leg: No edema.   Lymphadenopathy:      Cervical: No cervical adenopathy.   Skin:     General: Skin is warm.      Capillary Refill: Capillary refill takes less than 2 seconds.      Coloration: Skin is not jaundiced or pale.      Findings: No bruising, erythema or rash.   Neurological:      General: No focal deficit present.      Mental Status: He is alert and oriented to person, place, and time.      Sensory: No sensory deficit.      Motor: No weakness.      Gait: Gait normal.      Deep Tendon Reflexes: Reflexes normal.   Psychiatric:         Mood and Affect: Mood normal.         Behavior: Behavior normal.         Thought Content: Thought content normal.         Judgment: Judgment normal.           Assessment and Plan:     1. Primary hypertension  The patient carries a diagnosis of primary hypertension, which is currently well-controlled on olmesartan monotherapy. She reports consistent home blood pressure readings ranging from 110-120 mmHg systolic and 60-70 mmHg diastolic. There are no clinical or laboratory signs suggestive of end-organ damage, and no features concerning for a secondary etiology at this time. Blood pressure management remains stable and within target range.    Plan:    -Continue olmesartan at current dose.  -Encourage home BP monitoring with documentation of readings.  - on dietary sodium restriction and weight maintenance.    2. Lung nodule  The patient has a known history of a stable pulmonary nodule under routine surveillance. The most recent low-dose CT in 2024 was classified as Lung-RADS category 2, consistent with a benign appearance and low likelihood of malignancy. As per standard follow-up guidelines, a repeat low-dose CT chest is recommended in 12 months. A new order will be placed for LDCT in October 2025 to continue annual surveillance. No new respiratory symptoms are reported at this time.    Plan:  - Order LDCT for lung nodule surveillance for October  2025 during next appointment      3. Dyslipidemia  The patient has a longstanding history of dyslipidemia, currently managed with atorvastatin 10 mg daily. His most recent lipid panel demonstrates LDL levels at goal, with no indication for medication adjustment at this time. He remains asymptomatic and reports adherence to therapy. The current statin regimen will be continued, and routine lipid monitoring will be performed as part of outpatient follow-up. Lifestyle measures should also be maintained to support cardiovascular risk reduction.    Plan:  - Continue atorvastatin 10 mg    4. Prediabetes  The patient carries a diagnosis of prediabetes. Her most recent HbA1c has increased from 5.5% (9 months ago) to 6.2%, which may be attributable to increased dietary carbohydrate intake. She remains asymptomatic, and no pharmacologic therapy is indicated at this time. The patient was counseled extensively on lifestyle modifications, including reducing daily carbohydrate consumption, adopting a balanced diet, and engaging in at least 150 minutes per week of moderate- to high-intensity aerobic exercise. Continued surveillance with periodic HbA1c monitoring is recommended to assess for further glycemic progression.    Plan:  - Patient counseled about importance of lifestyle modifications  -Will consider getting an A1c within 6 months.    5. Chronic obstructive pulmonary disease, unspecified COPD type (HCC)  The patient has a known history of mild COPD, previously confirmed by pulmonary function testing in 2003 and supported by CT findings consistent with emphysema. He was not on maintenance therapy for several years but recently developed a persistent cough, which has shown significant improvement with the initiation of daily Spiriva (tiotropium). Although symptomatic control has improved, updated pulmonary function testing was ordered but remains incomplete. Given the chronic nature of his disease and the need for  reassessment of disease severity and long-term management, referral to pulmonology is recommended for further evaluation and optimization of care.    Plan:  - Referral to Pulmonary and Sleep Medicine  -Patient will complete new PFTs       Return in about 2 months (around 2025).      Shane Myers M.D., MPH. PGY-1 Internal Medicine  [facility]    This note was created using voice recognition software.  While every attempt is made to ensure accuracy of transcription, occasionally errors occur.         [1]   Past Medical History:  Diagnosis Date    Allergy     Blood transfusion without reported diagnosis 11 yoa    during surgery/  In [place]    Cataract     s/p surgery christi    Dental disorder     full upper denture     Heart burn     High cholesterol     Hyperlipidemia     Kidney disease     kidney stone at 10 yoa    Other emphysema (HCC) 2022    Snoring     Stroke (HCC) 2013    see ER records; anant consult after. presented with left sided weakness. takes 81mg daily   [2]   Social History  Tobacco Use    Smoking status: Former     Current packs/day: 0.00     Average packs/day: 0.5 packs/day for 54.9 years (27.4 ttl pk-yrs)     Types: Cigarettes, Cigars     Start date: 1968     Quit date: 2023     Years since quittin.0    Smokeless tobacco: Never   Vaping Use    Vaping status: Never Used   Substance Use Topics    Alcohol use: Never    Drug use: Yes     Comment: Very occasional   [3]   Current Outpatient Medications   Medication Sig Dispense Refill    benzonatate (TESSALON) 200 MG capsule Take 1 Capsule by mouth 3 times a day as needed for Cough. 90 Capsule 5    terazosin (HYTRIN) 10 MG capsule Take 1 Capsule by mouth every evening. 90 Capsule 3    tiotropium (SPIRIVA RESPIMAT) 2.5 mcg/Act Aero Soln Inhale 2 Inhalations every day. 3 Each 3    olmesartan (BENICAR) 20 MG Tab Take 1 Tablet by mouth every day. 100 Tablet 3    atorvastatin (LIPITOR) 10 MG  Tab Take 1 Tablet by mouth every day. 1 tab by mouth daily. 100 Tablet 1    aspirin EC (ECOTRIN) 81 MG Tablet Delayed Response Take 1 Tablet by mouth every day.       No current facility-administered medications for this visit.

## 2025-06-24 NOTE — Clinical Note
REFERRAL APPROVAL NOTICE         Sent on June 24, 2025                   Benedicto Hernandez  [address] [address]                   Dear Mr. Hernandez,    After a careful review of the medical information and benefit coverage, [facility] has processed your referral. See below for additional details.    If applicable, you must be actively enrolled with your insurance for coverage of the authorized service. If you have any questions regarding your coverage, please contact your insurance directly.    REFERRAL INFORMATION   Referral #:  [id number]  Referred-To Department    Referred-By Provider:  N/A    Shane Groves M.D.   Pulmonary/sleep [facility]      [address]  [place] [id number] [phone] [address], [address]  [place] [address] [phone]    Referral Start Date:  04/01/2025  Referral End Date:   04/01/2026           SCHEDULING  If you do not already have an appointment, please call [phone] to make an appointment.   MORE INFORMATION  As a reminder, [facility] ownership has changed, meaning this location is now owned and operated by [facility]. As such, we want to clarify that our patients should expect to receive two separate bills for the services received at [facility] - one representing the [facility] facility fees as the owner of the establishment, and the other to represent the physician's services and subsequent fees. You can speak with your insurance carrier for a pricing estimate by calling the customer service number on the back of your card and ask about charges for a hospital outpatient visit.  If you do not already have a [facility] account, sign up at: [email or web address]  You can access your medical information, make appointments, see lab results, billing information, and  more.  If you have questions regarding this referral, please contact  the [facility] department at:             [phone]. Monday - Friday 7:30AM - 5:00PM.      Sincerely,  [facility]

## 2025-07-01 NOTE — Clinical Note
REFERRAL APPROVAL NOTICE         Sent on July 1, 2025                   Benedicto Hernandez  [address][1] [address]                   Dear Mr. Hernandez,    After a careful review of the medical information and benefit coverage, [facility] has processed your referral. See below for additional details.    If applicable, you must be actively enrolled with your insurance for coverage of the authorized service. If you have any questions regarding your coverage, please contact your insurance directly.    REFERRAL INFORMATION   Referral #:  [id number]  Referred-To Department    Referred-By Provider:  Pulmonary and Sleep Medicine    Shane Groves M.D.   Pulmonary/sleep [facility]      [address]  [place] [id number] [phone] [address]  [place] [address] [phone]    Referral Start Date:  06/24/2025  Referral End Date:   06/24/2026           SCHEDULING  If you do not already have an appointment, please call [phone] to make an appointment.   MORE INFORMATION  As a reminder, [facility] ownership has changed, meaning this location is now owned and operated by [facility]. As such, we want to clarify that our patients should expect to receive two separate bills for the services received at [facility] - one representing the [facility] facility fees as the owner of the establishment, and the other to represent the physician's services and subsequent fees. You can speak with your insurance carrier for a pricing estimate by calling the customer service number on the back of your card and ask about charges for a hospital outpatient visit.  If you do not already have a [facility] account, sign up at: [email or web address]  You can access your medical information, make appointments, see lab results,  billing information, and more.  If you have questions regarding this referral, please contact  the [facility] department at:             [phone]. Monday - Friday 7:30AM - 5:00PM.      Sincerely,  [facility]

## 2025-07-02 ENCOUNTER — NON-PROVIDER VISIT (OUTPATIENT)
Dept: SLEEP MEDICINE | Facility: MEDICAL CENTER | Age: 70
End: 2025-07-02
Payer: MEDICARE

## 2025-07-02 VITALS — BODY MASS INDEX: 27.06 KG/M2 | WEIGHT: 189 LBS | HEIGHT: 70 IN

## 2025-07-02 DIAGNOSIS — J44.9 CHRONIC OBSTRUCTIVE PULMONARY DISEASE, UNSPECIFIED COPD TYPE (HCC): ICD-10-CM

## 2025-07-02 PROCEDURE — 94726 PLETHYSMOGRAPHY LUNG VOLUMES: CPT | Performed by: INTERNAL MEDICINE

## 2025-07-02 PROCEDURE — 94060 EVALUATION OF WHEEZING: CPT | Performed by: INTERNAL MEDICINE

## 2025-07-02 PROCEDURE — 94729 DIFFUSING CAPACITY: CPT | Mod: 26 | Performed by: INTERNAL MEDICINE

## 2025-07-02 PROCEDURE — 94060 EVALUATION OF WHEEZING: CPT | Mod: 26 | Performed by: INTERNAL MEDICINE

## 2025-07-02 PROCEDURE — 94729 DIFFUSING CAPACITY: CPT | Performed by: INTERNAL MEDICINE

## 2025-07-02 PROCEDURE — 94726 PLETHYSMOGRAPHY LUNG VOLUMES: CPT | Mod: 26 | Performed by: INTERNAL MEDICINE

## 2025-07-02 ASSESSMENT — FIBROSIS 4 INDEX: FIB4 SCORE: 1.59

## 2025-07-02 NOTE — PROCEDURES
Technician: Kristen Rendon RRT, CPFT, Margot CPFT  Good patient effort & cooperation.  The results of this test meet the ATS/ERS standards for acceptability & reproducibility.  Test was performed on the Gridcentric Body Plethysmograph-Elite DX system.  Predicted equations for Spirometry are GLI-2012, ITS for lung volumes, and GLI-2017 for DLCO.  The DLCO was uncorrected for Hgb.  A bronchodilator of Ventolin HFA -2puffs via spacer administered.  DLCO performed during dilation period.     Interpretation;   Baseline spirometry shows airflow obstruction with an FEV1/FVC of 55 with a Z-score of -2.46.  FEV1 is low normal at 2.23 L with a Z-score of -1.58.  There is a trend toward bronchodilator spots but does not meet ATS criteria.  Postbronchodilator FEV1 is 2.41 L.  Lung volumes are normal.  Diffusion capacity is reduced at 14.73 mL/min/mm [place] with a Z-score of -2.79.  Pulmonary function testing shows airflow obstruction with low normal FEV1 and reduction in diffusion capacity suggesting smoking-related obstructive lung disease.  Correlate clinically and with imaging.

## (undated) DEVICE — DRAPE U ORTHOPEDIC - (10/BX)

## (undated) DEVICE — PACK TOTAL HIP - (1/CA)

## (undated) DEVICE — SENSOR SPO2 NEO LNCS ADHESIVE (20/BX) SEE USER NOTES

## (undated) DEVICE — PROTECTOR ULNA NERVE - (36PR/CA)

## (undated) DEVICE — CORDS BIPOLAR COAGULATION - 12FT STERILE DISP. (10EA/BX)

## (undated) DEVICE — SUCTION INSTRUMENT YANKAUER BULBOUS TIP W/O VENT (50EA/CA)

## (undated) DEVICE — PAD EYE GAUZE COVERED OVAL 1 5/8 X 2 5/8" STERILE"

## (undated) DEVICE — DRILL BIT 2.5

## (undated) DEVICE — TIP INTPLS HFLO ML ORFC BTRY - (12/CS)  FOR SURGILAV

## (undated) DEVICE — NEPTUNE 4 PORT MANIFOLD - (20/PK)

## (undated) DEVICE — TUBING CLEARLINK DUO-VENT - C-FLO (48EA/CA)

## (undated) DEVICE — GLOVE BIOGEL INDICATOR SZ 8 SURGICAL PF LTX - (50/BX 4BX/CA)

## (undated) DEVICE — WATER IRRIGATION STERILE 1000ML (12EA/CA)

## (undated) DEVICE — GOWN WARMING STANDARD FLEX - (30/CA)

## (undated) DEVICE — SET LEADWIRE 5 LEAD BEDSIDE DISPOSABLE ECG (1SET OF 5/EA)

## (undated) DEVICE — DRAPE STRLE REG TOWEL 18X24 - (10/BX 4BX/CA)"

## (undated) DEVICE — SUTURE 0 ETHIBOND CT-2 C/R 8 X 18 (12PK/BX)"

## (undated) DEVICE — SUTURE GENERAL

## (undated) DEVICE — DRESSING TRANSPARENT FILM TEGADERM 2.375 X 2.75"  (100EA/BX)"

## (undated) DEVICE — SLING ORTH UNV TIETX VLFM ARM

## (undated) DEVICE — CANISTER SUCTION 3000ML MECHANICAL FILTER AUTO SHUTOFF MEDI-VAC NONSTERILE LF DISP  (40EA/CA)

## (undated) DEVICE — CHLORAPREP 26 ML APPLICATOR - ORANGE TINT(25/CA)

## (undated) DEVICE — SUTURE 5 TI-CRON HOS-14 - (36/BX)

## (undated) DEVICE — SUTURE 2-0 SILK 12 X 18" (36PK/BX)"

## (undated) DEVICE — MASK ANESTHESIA ADULT  - (100/CA)

## (undated) DEVICE — Device

## (undated) DEVICE — BLADE SAGITTAL SAW DUAL CUT 75.0 X 25.0MM (1/EA)

## (undated) DEVICE — CANNULA STRAIGHT 23G X 1 1/4IN - W/BLUNT (10/BX)

## (undated) DEVICE — SUTURE 5-0 ETHILON RD-1 EYE 18 (12PK/BX)"

## (undated) DEVICE — CATHETER IV 20 GA X 1-1/4 ---SURG.& SDS ONLY--- (50EA/BX)

## (undated) DEVICE — SODIUM CHL IRRIGATION 0.9% 1000ML (12EA/CA)

## (undated) DEVICE — SUTURE 6-0 PLAIN GUT G-1 D/A 18 (12PK/BX)"

## (undated) DEVICE — FOAM FACEHOLDER SPIDER (8EA/BX)

## (undated) DEVICE — NEEDLE FILTER ASPIRATION 18 GA X 1 1/2 IN (100EA/BX)

## (undated) DEVICE — SUTURE EYE

## (undated) DEVICE — ELECTRODE 850 FOAM ADHESIVE - HYDROGEL RADIOTRNSPRNT (50/PK)

## (undated) DEVICE — SUTURE  0 ETHIBOND CT-1 30 IN (36PK/BX)

## (undated) DEVICE — KIT ANESTHESIA W/CIRCUIT & 3/LT BAG W/FILTER (20EA/CA)

## (undated) DEVICE — LACTATED RINGERS INJ 1000 ML - (14EA/CA 60CA/PF)

## (undated) DEVICE — DRAPE SURGICAL U 77X120 - (10/CA)

## (undated) DEVICE — HANDPIECE 10FT INTPLS SCT PLS IRRIGATION HAND CONTROL SET (6/PK)

## (undated) DEVICE — SUTURE 7-0 VICRYL TG140-8 (12PK/BX)

## (undated) DEVICE — DRAPE U SPLIT IMP 54 X 76 - (24/CA)

## (undated) DEVICE — SET EXTENSION WITH 2 PORTS (48EA/CA) ***PART #2C8610 IS A SUBSTITUTE*****

## (undated) DEVICE — CANISTER SUCTION RIGID RED 1500CC (40EA/CA)

## (undated) DEVICE — SYRINGE SAFETY 5 ML 18 GA X 1-1/2 BLUNT LL (100/BX 4BX/CA)

## (undated) DEVICE — GLOVE BIOGEL SZ 7.5 SURGICAL PF LTX - (50PR/BX 4BX/CA)

## (undated) DEVICE — BLOCK

## (undated) DEVICE — KIT  I.V. START (100EA/CA)

## (undated) DEVICE — GLOVE BIOGEL SURGICAL PF LATEX M SIZE 8.5 (50PR/BX 4BX/CA)

## (undated) DEVICE — SYRINGE SAFETY 10 ML 18 GA X 1 1/2 BLUNT LL (100/BX 4BX/CA)

## (undated) DEVICE — SUTURE 8-0 COATED VICRYL TG160-8 (12PK/BX)

## (undated) DEVICE — PROBE LASER ILLUMINATED FLEX CURVED 25G (6EA/BX)

## (undated) DEVICE — DETERGENT RENUZYME PLUS 10 OZ PACKET (50/BX)

## (undated) DEVICE — SUTURE 2-0 MONOCRYL CT-1

## (undated) DEVICE — ELECTRODE DUAL RETURN W/ CORD - (50/PK)

## (undated) DEVICE — SLEEVE, VASO, THIGH, MED

## (undated) DEVICE — SUTURE 0 ETHIBOND CT-1 - (12/BX) 18 INCH

## (undated) DEVICE — HEAD HOLDER JUNIOR/ADULT

## (undated) DEVICE — GLOVE BIOGEL SZ 8 SURGICAL PF LTX - (50PR/BX 4BX/CA)

## (undated) DEVICE — GLOVE BIOGEL M SZ 8 SURGICAL PF LTX - (50/BX 4BX/CA)

## (undated) DEVICE — TUBE E-T HI-LO CUFF 7.5MM (10EA/PK)

## (undated) DEVICE — KIT ROOM DECONTAMINATION

## (undated) DEVICE — SHIELD OPTH AL GRTR CVR FOX (50EA/BX)